# Patient Record
Sex: MALE | Race: WHITE | NOT HISPANIC OR LATINO | Employment: OTHER
[De-identification: names, ages, dates, MRNs, and addresses within clinical notes are randomized per-mention and may not be internally consistent; named-entity substitution may affect disease eponyms.]

---

## 2017-03-21 ENCOUNTER — RECORDS - HEALTHEAST (OUTPATIENT)
Dept: ADMINISTRATIVE | Facility: OTHER | Age: 62
End: 2017-03-21

## 2017-03-21 ENCOUNTER — OFFICE VISIT (OUTPATIENT)
Dept: NEUROLOGY | Facility: CLINIC | Age: 62
End: 2017-03-21

## 2017-03-21 VITALS — HEART RATE: 64 BPM | SYSTOLIC BLOOD PRESSURE: 148 MMHG | DIASTOLIC BLOOD PRESSURE: 82 MMHG

## 2017-03-21 DIAGNOSIS — F03.90 DEMENTIA WITHOUT BEHAVIORAL DISTURBANCE, UNSPECIFIED DEMENTIA TYPE: Primary | ICD-10-CM

## 2017-03-21 NOTE — PROGRESS NOTES
"Referral source: Established patient     Chief complaint: Dementia.      History of the Present Illness: Mr. Jose Loco is a 61-year-old man who returns to the neuroimmunology Clinic today for repeat evaluation of his encephalopathy.  This is a patient whom I saw initially for an opinion in October 2015.  At that time, he and his wife described a 4-year history of cognitive decline and behavioral change.  He had previously been seen by Dr. Brower of our department and prior to that saw a number of specialists at the [facility] in [place].  He had been found to have an abnormal paraneoplastic panel with positive acetylcholine receptor binding and modulating antibodies as well as GAD65 antibody.  There were several confounding factors including history of sleep apnea, history of alcohol abuse and possibly some lifelong learning disability.  Nevertheless, it was clear from the patient's wife that there had been a substantial change in his behavior in addition to progressive difficulties with memory loss over the past several years, and we were suspicious of a dementia.      We decided to embark on a course of immunotherapy (methylprednisolone 1 gram IV monthly) beginning in December 2015, essentially as a diagnostic and therapeutic trial.  A repeat neuropsychological evaluation in February 2016 showed some modest improvements in attention, learning and memory.  We have subsequently continued the steroids on a monthly basis for the next 12 months.      Today the patient reports that he is doing \"pretty good.\"  He last received steroids at the beginning of March.  He feels that he is, overall, more alert.  His wife notes that he is more conversant and outgoing than he was one year ago.  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  He has been reading the sports section " in the paper recently, which he had also stopped doing previously.  (He was able to answer some simple questions in that regard, such as being aware of the NCAA tournament is ongoing and that the local hockey club has lost several games in a row.)      His wife does not feel that there is anything from a cognitive standpoint that he has lost over the last year or so.        Review of Systems:  He does continue to have intermittent nausea and diarrhea after eating.  I did encourage them to discuss this persistent symptom with their primary care provider.      PHYSICAL EXAMINATION:   VITAL SIGNS:  Blood pressure 140/82; respiratory rate 12 breaths per minute; pulse 64.   GENERAL:  Somewhat disheveled-appearing man who presents to the examination accompanied by his wife, awake and alert and in no acute distress.   NEUROLOGIC:   MENTAL STATUS:  I administered the Kokmen Short Test of Mental Status, with scores on the subcomponents as follows:    Orientation:  7/8 (gives date as 03/22).     Attention:  5/7 (digit span is 5 forward).     Immediate recall:  4/4 (3 trials).     General Information:  2/4 (able to name first and current president, gives the number of weeks in a year as 360 and cannot provide a coherent definition of island).     Abstraction:  3/3.   Calculation:  1/4.   Construction:  2/4.     Delayed Recall 3/4.   Total score is 25/38, which is a decline of 1 point from July of last year.  Performance on delayed recall would be atypically preserved for dementia of the Alzheimer's type.      Assessment/plan:    1.  Dementia of undetermined etiology  Overall, the patient's mental status on an office screening test has been relatively stable dating back to October 2015.  He does not seem obviously worse over that interval, per the history presented by the patient and his wife, and possibly there may be some subtle improvements over that time course as well.      I told him that I would like him to undergo a  repeat neuropsychological evaluation.  They will be seeing Dr. Mcclendon at [place] tomorrow and will discuss obtaining repeat neurocognitive evaluation through Dr. Govea, who has performed his previous neurocognitive testing.  He seems to be tolerating steroids well and they are fairly convinced that this is helping, although I have not necessarily seen evidence of dramatic benefit.  For now, I am going to proceed with an additional couple of months of methylprednisolone treatments at 1 gram IV monthly.  In case there is any response, I do not want him to stop this medication before he has the neurocognitive testing done.  I am then going to have him return to clinic for discussion in 2 months.  If neurocognitive testing is suggestive of ongoing decline, I think we would need to re-evaluate the overall risks and benefits of this approach.  If he is at least stable, on the other hand, it may be reasonable to continue for now.      I spent 26 minutes with the patient and his wife in the office today, with greater than 50% of this time spent in counseling.         ELIZABETH LOGAN MD             D: 2017 15:11   T: 2017 16:26   MT: nh      Name:     LEYDI GRACE   MRN:      [id number]        Account:      [id number]   :      1955           Service Date: 2017      Document: [id number]

## 2017-03-21 NOTE — PATIENT INSTRUCTIONS
1. Advise repeat neuropsychological evaluation with Dr. Govea    2. Continue IV methylprednisolone monthly in April and May    3. Return to clinic in two months

## 2017-03-21 NOTE — MR AVS SNAPSHOT
After Visit Summary   3/21/2017    Jose Loco    MRN: [id number]           Patient Information     Date Of Birth          1955        Visit Information        Provider Department      3/21/2017 1:00 PM Marco Gipson MD [facility]        Today's Diagnoses     Dementia without behavioral disturbance, unspecified dementia type    -  1      Care Instructions    1. Advise repeat neuropsychological evaluation with Dr. Govea    2. Continue IV methylprednisolone monthly in April and May    3. Return to clinic in two months        Follow-ups after your visit        Your next 10 appointments already scheduled     May 16, 2017  2:30 PM CDT   Return Multiple Sclerosis with Marco Gipson MD   [facility] ([facility])    [address], [address]  [place] [place] [address] [phone]            Aug 30, 2017  1:00 PM CDT   Return Visit with Sandeep Owens MD   [facility] ([facility])    [address], [address]  [place] [place] [address] [phone]              Who to contact     Please call your clinic at [phone] to:    Ask questions about your health    Make or cancel appointments    Discuss your medicines    Learn about your test results    Speak to your doctor   If you have compliments or concerns about an experience at your clinic, or if you wish to file a complaint, please contact [facility] Patient Relations at [phone] or email us at [email or web address]         Additional Information About Your Visit        MyChart Information     [facility] is an electronic gateway that provides easy, online access to your medical records. With [facility], you can request a clinic appointment, read your test results, renew a prescription or communicate with  your care team.     To sign up for [employer] visit the website at [email or web address]   You will be asked to enter the access code listed below, as well as some personal information. Please follow the directions to create your username and password.     Your access code is: [email or web address]  Expires: 2017 12:39 AM     Your access code will  in 90 days. If you need help or a new code, please contact your [facility] or call [phone] for assistance.        Care EveryWhere ID     This is your Care EveryWhere ID. This could be used by other organizations to access your [place] medical records  [id number]        Your Vitals Were     Pulse                   64            Blood Pressure from Last 3 Encounters:   17 148/82   16 146/68   03/15/16 126/68    Weight from Last 3 Encounters:   12/21/15 104.3 kg (230 lb)   10/12/15 103.7 kg (228 lb 9.6 oz)              Today, you had the following     No orders found for display         Today's Medication Changes          These changes are accurate as of: 3/21/17 11:59 PM.  If you have any questions, ask your nurse or doctor.               Stop taking these medicines if you haven't already. Please contact your care team if you have questions.     fentaNYL 25 mcg/hr 72 hr patch   Commonly known as:  DURAGESIC   Stopped by:  Marco Gipson MD           HYDROcodone-acetaminophen  MG per tablet   Commonly known as:  NORCO   Stopped by:  Marco Gipson MD                    Primary Care Provider Office Phone # Fax #    Justo Briscoe [phone]       [facility] [address] [address]        Thank you!     Thank you for choosing [facility]  for your care. Our goal is always to provide you with excellent care. Hearing back from our patients is one way we can continue to improve our services. Please take a few  minutes to complete the written survey that you may receive in the mail after your visit with us. Thank you!             Your Updated Medication List - Protect others around you: Learn how to safely use, store and throw away your medicines at www.disposemymeds.org.          This list is accurate as of: 3/21/17 11:59 PM.  Always use your most recent med list.                   Brand Name Dispense Instructions for use    ALBUTEROL SULFATE HFA IN      Inhale 90 mcg into the lungs 1-2 puffs every 4 hours prn.       ALLOPURINOL PO      Take 100 mg by mouth 3 times daily       AMOXICILLIN PO      Take by mouth daily Reported on 3/21/2017       aspirin 81 MG tablet      Take by mouth daily       BACLOFEN PO      Take 10 mg by mouth 2 times daily       clotrimazole 1 % cream    LOTRIMIN     Apply topically 2 times daily Reported on 3/21/2017       COMPRESSION STOCKINGS      1 each       cyanocobalamin 1000 MCG/ML injection    VITAMIN B12     Inject 1 mL into the muscle every 30 days       DIPHENHYDRAMINE HCL EX      Externally apply 2 % topically as needed Reported on 3/21/2017       donepezil 10 MG tablet    ARICEPT    30 tablet    Take 1 tablet (10 mg) by mouth At Bedtime       FLONASE NA      Spray 50 mcg in nostril Reported on 3/21/2017       GABAPENTIN PO      Take 800 mg by mouth 3 times daily       HM VITAMIN D3 2000 UNITS Caps   Generic drug:  cholecalciferol     100 capsule    TAKE TWO TABLETS BY MOUTH ONCE EVERY DAY daily       IBUPROFEN PO      Take 800 mg by mouth as needed Reported on 3/21/2017       levETIRAcetam 500 MG tablet    KEPPRA    120 tablet    Take 1 tab (500mg) by mouth in AM, 1 tab (500mg) in PM, and 2 tabs (1000mg) at bedtime       lidocaine 5 % Patch    LIDODERM     Place 1 patch onto the skin every 24 hours Reported on 3/21/2017       MIRAPEX PO      Take 0.5 mg by mouth 3 times daily       NAFTIN 2 % Gel   Generic drug:  Naftifine      Apply topically daily Reported on 3/21/2017       other  "medical supplies      Cane (device) For home use. X 12 weeks.       PAXIL PO      Take 20 mg by mouth daily       SIMVASTATIN PO      Take 40 mg by mouth At Bedtime       TRAZODONE HCL PO      Take 1 tablet by mouth 3 times daily       triamcinolone 0.5 % cream    KENALOG     Apply topically 3 times daily Reported on 3/21/2017       * UNABLE TO FIND      Diabetic shoes DX: Diabetic Neuropathy       * UNABLE TO FIND      (order) P7 - CROW/CLON/GEORGETTE/IMIP/LIDO/PENT - 10/0.2/6/3/5/3% Apply 1-2 gms to affected area 3-4x per day. Rub in for 1-2 minutes.       * UNABLE TO FIND      Syringe w/ Needle, Disposable - 3 ML (syringe) 22 x 1\" As directed.       * UNABLE TO FIND      MEDICATION NAME: nupro       VITRON-C PO      Take 1 tablet by mouth 2 times daily 65 mg iron - 125 mg       ZOFRAN PO      Take by mouth as needed for nausea or vomiting Reported on 3/21/2017       * Notice:  This list has 4 medication(s) that are the same as other medications prescribed for you. Read the directions carefully, and ask your doctor or other care provider to review them with you.      "

## 2017-03-21 NOTE — LETTER
"3/21/2017      RE: Jose Loco  [address] [address]       Referral source: Established patient     Chief complaint: Dementia.      History of the Present Illness: Mr. Jose Loco is a 61-year-old man who returns to the neuroimmunology Clinic today for repeat evaluation of his encephalopathy.  This is a patient whom I saw initially for an opinion in October 2015.  At that time, he and his wife described a 4-year history of cognitive decline and behavioral change.  He had previously been seen by Dr. Brower of our department and prior to that saw a number of specialists at the [facility] in [place].  He had been found to have an abnormal paraneoplastic panel with positive acetylcholine receptor binding and modulating antibodies as well as GAD65 antibody.  There were several confounding factors including history of sleep apnea, history of alcohol abuse and possibly some lifelong learning disability.  Nevertheless, it was clear from the patient's wife that there had been a substantial change in his behavior in addition to progressive difficulties with memory loss over the past several years, and we were suspicious of a dementia.      We decided to embark on a course of immunotherapy (methylprednisolone 1 gram IV monthly) beginning in December 2015, essentially as a diagnostic and therapeutic trial.  A repeat neuropsychological evaluation in February 2016 showed some modest improvements in attention, learning and memory.  We have subsequently continued the steroids on a monthly basis for the next 12 months.      Today the patient reports that he is doing \"pretty good.\"  He last received steroids at the beginning of March.  He feels that he is, overall, more alert.  His wife notes that he is more conversant and outgoing than he was one year ago.  They relate that he is occasionally asking to help with tasks such as washing dishes at his brother-in-law's restaurant, which shows more " initiative then he seemed to have in the past.  He has been reading the sports section in the paper recently, which he had also stopped doing previously.  (He was able to answer some simple questions in that regard, such as being aware of the NCAA tournament is ongoing and that the local hockey club has lost several games in a row.)      His wife does not feel that there is anything from a cognitive standpoint that he has lost over the last year or so.        Review of Systems:  He does continue to have intermittent nausea and diarrhea after eating.  I did encourage them to discuss this persistent symptom with their primary care provider.      PHYSICAL EXAMINATION:   VITAL SIGNS:  Blood pressure 140/82; respiratory rate 12 breaths per minute; pulse 64.   GENERAL:  Somewhat disheveled-appearing man who presents to the examination accompanied by his wife, awake and alert and in no acute distress.   NEUROLOGIC:   MENTAL STATUS:  I administered the Kokmen Short Test of Mental Status, with scores on the subcomponents as follows:    Orientation:  7/8 (gives date as 03/22).     Attention:  5/7 (digit span is 5 forward).     Immediate recall:  4/4 (3 trials).     General Information:  2/4 (able to name first and current president, gives the number of weeks in a year as 360 and cannot provide a coherent definition of island).     Abstraction:  3/3.   Calculation:  1/4.   Construction:  2/4.     Delayed Recall 3/4.   Total score is 25/38, which is a decline of 1 point from July of last year.  Performance on delayed recall would be atypically preserved for dementia of the Alzheimer's type.      Assessment/plan:    1.  Dementia of undetermined etiology  Overall, the patient's mental status on an office screening test has been relatively stable dating back to October 2015.  He does not seem obviously worse over that interval, per the history presented by the patient and his wife, and possibly there may be some subtle  improvements over that time course as well.      I told him that I would like him to undergo a repeat neuropsychological evaluation.  They will be seeing Dr. Mcclendon at [place] tomorrow and will discuss obtaining repeat neurocognitive evaluation through Dr. Govea, who has performed his previous neurocognitive testing.  He seems to be tolerating steroids well and they are fairly convinced that this is helping, although I have not necessarily seen evidence of dramatic benefit.  For now, I am going to proceed with an additional couple of months of methylprednisolone treatments at 1 gram IV monthly.  In case there is any response, I do not want him to stop this medication before he has the neurocognitie testing done.  I am then going to have him return to clinic for discussion in 2 months.  If neurocognitive testing is suggestive of ongoing decline, I think we would need to re-evaluate the overall risks and benefits of this approach.  If he is at least stable, on the other hand, it may be reasonable to continue for now.      I spent 26 minutes with the patient and his wife in the office today, with greater than 50% of this time spent in counseling.      Marco Gipson MD   of Neurology  [facility]    Cc:  Justo Briscoe MD (PCP)  MD Kendra Prince, PhD  Patient

## 2017-03-22 ENCOUNTER — HOSPITAL ENCOUNTER (OUTPATIENT)
Dept: OCCUPATIONAL THERAPY | Age: 62
Setting detail: THERAPIES SERIES
Discharge: STILL ADMITTED OR ADMINISTRATIVE | End: 2017-03-22
Attending: PSYCHIATRY & NEUROLOGY

## 2017-03-22 ENCOUNTER — HOSPITAL ENCOUNTER (OUTPATIENT)
Dept: NEUROLOGY | Facility: CLINIC | Age: 62
Setting detail: THERAPIES SERIES
Discharge: STILL ADMITTED OR ADMINISTRATIVE | End: 2017-03-22
Attending: PSYCHIATRY & NEUROLOGY

## 2017-03-22 DIAGNOSIS — R41.89 COGNITIVE IMPAIRMENT: ICD-10-CM

## 2017-03-22 DIAGNOSIS — F03.90 PROGRESSIVE DEMENTIA WITH UNCERTAIN ETIOLOGY (H): ICD-10-CM

## 2017-03-31 ENCOUNTER — TELEPHONE (OUTPATIENT)
Dept: NEUROLOGY | Facility: CLINIC | Age: 62
End: 2017-03-31

## 2017-03-31 NOTE — TELEPHONE ENCOUNTER
I received the following message from Dr. Gipson:    This is a patient whom we have been treating for a question of autoimmune encephalopathy with monthly corticosteroid infusions. I would like him to receive 1 gram of methylprednisolone IV monthly x 2 dose in April and May (around the beginning of each month).    Therapy orders placed for methylprednisolone 1 gm IV monthly x 2 doses in April and May. Pended to Dr. Gipson for signature.

## 2017-04-03 NOTE — TELEPHONE ENCOUNTER
I called [facility] in [place] Briscoe's office, and spoke with Rhonda (phone [phone] fax [phone]), and she said that Dr Briscoe would have no problem cosigning these orders again; She will then forward them to [facility] in [place] (phone [phone] fax [phone]); I called [facility] and gave them the heads up as well; Infusion orders faxed to [facility] accordingly.    May Browne, MS RN Care Coordinator

## 2017-04-25 NOTE — TELEPHONE ENCOUNTER
Patients wife called and let us know that Dr. Briscoe's office has not received the orders to cosign. Forwarded a note to May Browne RN.     Bonifacio Kim LPN

## 2017-04-25 NOTE — TELEPHONE ENCOUNTER
That complicates matters somewhat. There are a couple of options--we could try to get him in with neuro-psychology at the [place], but I am doubtful that we could get him in before his appointment.     Alternatively, we could proceed with methylprednisolone infusions monthly through the summer and then reassess after his next neuropsychologic evaluation.     Let's touch base with the patient's wife to see what she wants to do--if they would like to expedite things, we can see when we can get him in here. Otherwise I would plan on seeing him back in September after his appointment with Dr. Govea.

## 2017-04-25 NOTE — TELEPHONE ENCOUNTER
[facility] never got the orders from Dr. Briscoe's office. I called Rhonda back and spoke to Cindi who will re-fax the orders to [facility]. I called Jose's wife to update her with this information. Regina also wanted us to know that she could not get Jose in with Neuro-psych until September as Dr. Hadley is out on maternity leave, they are on a waiting list.    Regina Murphy wanted to know if you still wanted to see Jose in May as he won't have had his neuro-psych testing. Thank you.    Gemini Macedo, MS RN Care Coordinator

## 2017-04-26 NOTE — TELEPHONE ENCOUNTER
I called Dr. Govea's office to get Jose's last neuro-psych eval. They will fax over most recent evaluation.    Gemini Macedo MS RN Care Coordinator

## 2017-04-26 NOTE — TELEPHONE ENCOUNTER
We need to get the records of his most recent neuropscyhological evaluation from Dr. Govea's office and make sure these are available to Dr. Pacheco at the time of his appointment.

## 2017-04-26 NOTE — TELEPHONE ENCOUNTER
I called Regina and Jose with Dr. Gipson's input. Regina is going to call [facility] this morning to get infusions set-up. I spoke with Vicky in Neuro-Psych and they are able to get him in on May 12th at 8:00 am, this was okay with the wife.    Gemini Macedo, MS RN Care Coordinator

## 2017-05-12 ENCOUNTER — OFFICE VISIT (OUTPATIENT)
Dept: NEUROPSYCHOLOGY | Facility: CLINIC | Age: 62
End: 2017-05-12

## 2017-05-12 DIAGNOSIS — F03.90 DEMENTIA WITHOUT BEHAVIORAL DISTURBANCE, UNSPECIFIED DEMENTIA TYPE: ICD-10-CM

## 2017-05-12 DIAGNOSIS — G40.309: Primary | ICD-10-CM

## 2017-05-12 NOTE — PROGRESS NOTES
The patient was seen for neuropsychological evaluation at the request of Marco Gipson for the purpose of diagnostic clarification and treatment planning.  Three hours of face to face testing were provided by this writer.  Please see Dr. Vicky Pacheco's report for a full interpretation of the findings.    Melina Manrique  Psychometrist

## 2017-05-12 NOTE — MR AVS SNAPSHOT
After Visit Summary   5/12/2017    Jose Loco    MRN: [id number]           Patient Information     Date Of Birth          1955        Visit Information        Provider Department      5/12/2017 8:00 AM Vicky Pacheco, PhD [facility]        Today's Diagnoses     Cryptogenic generalized epilepsy (H)    -  1    Dementia without behavioral disturbance, unspecified dementia type           Follow-ups after your visit        Your next 10 appointments already scheduled     May 16, 2017  2:30 PM CDT   Return Multiple Sclerosis with Marco Gipson MD   [facility] ([facility])    [address], [address]  [facility] [address] [phone]            Aug 30, 2017  1:00 PM CDT   Return Visit with Sandeep Owens MD   [facility] ([facility])    [address] [place] [address], [facility] [address]  [facility] [id number] [phone]              Who to contact     Please call your clinic at [phone] to:    Ask questions about your health    Make or cancel appointments    Discuss your medicines    Learn about your test results    Speak to your doctor   If you have compliments or concerns about an experience at your clinic, or if you wish to file a complaint, please contact [facility] Patient Relations at [phone] or email us at [email or web address]         Additional Information About Your Visit        MyChart Information     Boastifyt is an electronic gateway that provides easy, online access to your medical records. With [facility], you can request a clinic appointment, read your test results, renew a prescription or communicate with your care team.     To sign up for Boastifyt visit the website at [email or web address]   You will be asked to enter the access code listed below, as well as some personal  information. Please follow the directions to create your username and password.     Your access code is: [email or web address]  Expires: 2017 12:39 AM     Your access code will  in 90 days. If you need help or a new code, please contact your [facility] or call [phone] for assistance.        Care EveryWhere ID     This is your Care EveryWhere ID. This could be used by other organizations to access your [facility] medical records  [id number]         Blood Pressure from Last 3 Encounters:   17 148/82   16 146/68   03/15/16 126/68    Weight from Last 3 Encounters:   12/21/15 104.3 kg (230 lb)   10/12/15 103.7 kg (228 lb 9.6 oz)              We Performed the Following     [id number] TESTING, PER HR/PSYCHOLOGIST     NEUROPSYCH TESTING BY [facility]        Primary Care Provider Office Phone # Fax #    Justo Briscoe [phone]       [facility] [address] [facility] [address]        Thank you!     Thank you for choosing [facility]  for your care. Our goal is always to provide you with excellent care. Hearing back from our patients is one way we can continue to improve our services. Please take a few minutes to complete the written survey that you may receive in the mail after your visit with us. Thank you!             Your Updated Medication List - Protect others around you: Learn how to safely use, store and throw away your medicines at www.disposemymeds.org.          This list is accurate as of: 17 11:59 PM.  Always use your most recent med list.                   Brand Name Dispense Instructions for use    ALBUTEROL SULFATE HFA IN      Inhale 90 mcg into the lungs 1-2 puffs every 4 hours prn.       ALLOPURINOL PO      Take 100 mg by mouth 3 times daily       AMOXICILLIN PO      Take by mouth daily Reported on 3/21/2017       aspirin 81 MG tablet      Take by mouth daily       BACLOFEN PO      Take 10 mg by mouth 2  "times daily       clotrimazole 1 % cream    LOTRIMIN     Apply topically 2 times daily Reported on 3/21/2017       COMPRESSION STOCKINGS      1 each       cyanocobalamin 1000 MCG/ML injection    VITAMIN B12     Inject 1 mL into the muscle every 30 days       DIPHENHYDRAMINE HCL EX      Externally apply 2 % topically as needed Reported on 3/21/2017       donepezil 10 MG tablet    ARICEPT    30 tablet    Take 1 tablet (10 mg) by mouth At Bedtime       FLONASE NA      Spray 50 mcg in nostril Reported on 3/21/2017       GABAPENTIN PO      Take 800 mg by mouth 3 times daily       HM VITAMIN D3 2000 UNITS Caps   Generic drug:  cholecalciferol     100 capsule    TAKE TWO TABLETS BY MOUTH ONCE EVERY DAY daily       IBUPROFEN PO      Take 800 mg by mouth as needed Reported on 3/21/2017       levETIRAcetam 500 MG tablet    KEPPRA    120 tablet    Take 1 tab (500mg) by mouth in AM, 1 tab (500mg) in PM, and 2 tabs (1000mg) at bedtime       lidocaine 5 % Patch    LIDODERM     Place 1 patch onto the skin every 24 hours Reported on 3/21/2017       MIRAPEX PO      Take 0.5 mg by mouth 3 times daily       NAFTIN 2 % Gel   Generic drug:  Naftifine      Apply topically daily Reported on 3/21/2017       other medical supplies      Cane (device) For home use. X 12 weeks.       PAXIL PO      Take 20 mg by mouth daily       SIMVASTATIN PO      Take 40 mg by mouth At Bedtime       TRAZODONE HCL PO      Take 1 tablet by mouth 3 times daily       triamcinolone 0.5 % cream    KENALOG     Apply topically 3 times daily Reported on 3/21/2017       * UNABLE TO FIND      Diabetic shoes DX: Diabetic Neuropathy       * UNABLE TO FIND      (order) P7 - CROW/CLON/GEORGETTE/IMIP/LIDO/PENT - 10/0.2/6/3/5/3% Apply 1-2 gms to affected area 3-4x per day. Rub in for 1-2 minutes.       * UNABLE TO FIND      Syringe w/ Needle, Disposable - 3 ML (syringe) 22 x 1\" As directed.       * UNABLE TO FIND      MEDICATION NAME: nupro       VITRON-C PO      Take 1 tablet by " mouth 2 times daily 65 mg iron - 125 mg       ZOFRAN PO      Take by mouth as needed for nausea or vomiting Reported on 3/21/2017       * Notice:  This list has 4 medication(s) that are the same as other medications prescribed for you. Read the directions carefully, and ask your doctor or other care provider to review them with you.

## 2017-05-15 NOTE — PROGRESS NOTES
NAME: Jose Loco  MR#: [id number]  YOB: 1955  DATE OF EXAM: 5/12/2017    Neuropsychology Laboratory  [facility]  [address]  [place]  [address] [phone]    NEUROPSYCHOLOGICAL EVALUATION    RELEVANT HISTORY AND REASON FOR REFERRAL    Jose Loco is a 61 year old right handed former  with 12 years of formal education. Information was obtained via interview with the patient and review of his medical records, including prior neuropsychological evaluations. Mr. Loco has a complex medical history including chronic cervical myelopathy, progressive memory loss, gait disturbance, and sleep apnea. He has been evaluated extensively by neurologists at various institutions. He first met with Neurology at the [facility] on 8/18/2015, when he saw Bob Brower M.D. Records from that appointment describe a complicated history. In 2002 he started to lose balance while doing yard work, and was falling. He subsequently lost bowel and bladder control and had severe paralysis of his legs. He underwent surgery, apparently for a large herniated disc impinging the spinal cord, and had some return of function. He still had weakness of his legs but was able to ambulate. Around 2011 or 2013 he started to have problems with memory, which has been progressive. There was mention of history of difficulties at birth and doing poorly in school, and a history of chemical dependency including alcohol. Abnormal laboratory tests were suggestive of an autoimmune process. He had undergone a neuropsychological evaluation and [facility] under the direction of Anabelle Asif, PhD on 3/21/2013. The records from that evaluation are not available for review; however, Dr. Brower indicated that he had scored 124/144 on the DRS with difficulties in memory, initiation, and perseveration. He did poorly on the Wechsler Adult Intelligence Scale, the AVLT, Trail  Making Test, and Stroop. Fluency, naming, and executive functioning were also noted to be poor. He continues to be followed by Neurology, most recently by Marco Gipson MD. At an encounter on 3/21/2017, he was noted to be returning to the neuroimmunology clinic for repeat evaluation of his encephalopathy. Dr. Gipson indicated that he had initially seen Mr. Loco in October 2015, where he and his wife described a four year history of cognitive decline and behavior change. He had been found to have an abnormal paraneoplastic panel with positive acetylcholine receptor binding and modulating antibodies as well as GAD65 antibody. There were several confounding factors including history of sleep apnea, alcohol abuse, and possibly some lifelong learning disability. It was clear from his wife at the time that there had been a substantial change in his behavior in addition to progressive difficulties with memory loss over the past several years, so they were suspicious of a dementia. He had a course of immunotherapy beginning in December 2015 essentially as a diagnostic and therapeutic trial. A repeat neuropsychological evaluation in February 2016, under the direction of Kendra Govea, Ph.D., showed some modest improvements (see below), and the steroids were continued on a monthly basis for the next 12 months. When he returned to clinic in March 2017, he reported that he was doing  pretty good,  and that he had last received steroids at the beginning of March. He felt that he was overall more alert, and his wife noted that he was more conversant and outgoing that he was one year earlier. He was occasionally asking to help with tasks such as washing dishes at his brother-in-law s restaurant, and showed more initiative than he seems to have in the past. His wife did not feel there was anything from a cognitive standpoint that he has lost over the previous year. Dr. Gipson requested an updated  neuropsychological evaluation to assist in determining whether there has been benefit from the monthly methylprednisolone treatments. He noted that if the neurocognitive testing is suggestive of ongoing decline then it would be necessary to reevaluate the overall risks and benefits of the approach.     The report from a February 12, 2016 neuropsychological evaluation under the direction of Kendra Govea, Ph.D., was available for review. Dr. Govea indicated that she had previously evaluated Mr. Loco in 2015. At that time, he exhibited a pattern of cognitive slowing, executive dysfunction, and impaired learning and memory abilities that were felt to indicate the presence of both frontal and subcortical dysfunction. A multifactorial etiology was suspected (i.e., polypharmacy, developmental based learning difficulties, alcohol dependence). However, when the progressive course of his symptoms was considered in the context of his neurobehavioral symptoms and impaired activities of daily living, Dr. Govea felt that the findings might represent the emergence of behavioral variant frontotemporal dementia. Medial temporal lobe involvement was noted but was felt to be stable since his 2013 evaluation at [facility], possibly attributable to donepezil, which was initiated in 2014. She noted that in the interim between the 2015 and 2016 evaluations, Mr. 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 He was subsequently initiated on the therapeutic trial of methylprednisolone in December 2015 and his family reported an immediate improvement of his cognitive symptoms and behavior. Shortly thereafter, he reportedly allowed family members to take over the management of his medications, which he had previously refused, and he began weaning himself down from his narcotic pain medications. By  "February 2016, his family was reporting improvement in his cognition, emotional regulation, and social comportment since December 2015. Results of the February 2016 evaluations supported the presence of only subtle improvements in a select few areas of cognition. The majority of his performances remained in the mildly to moderately impaired range and were largely commensurate with the results of his 2015 evaluation. Subtle improvements were noted in attention, learning, and memory, when compared to his previous evaluation. His profile continued to support the presence of major neurocognitive disorder due to multiple etiologies with behavioral disturbance. A number of factors occurring over the course of the previous year were thought responsible for the noted improvements in his functioning including improved seizure control, medication compliance with decreasing reliance on narcotic pain medications, and his recently completed trial of immunotherapy.    CLINICAL INTERVIEW FINDINGS    Upon interview, Mr. Loco was not able to state clearly why he was being referred. His wife indicated that he has been diagnosed with frontotemporal dementia for the last five years or so. His first symptoms included a seizure. He was losing things and had behavioral changes. He became verbally aggressive, which was a major change for him as he had been mellow and kind before that. He became sarcastic and \"a little inappropriate.\" He began losing things. He had trouble recognizing people who he has known for a couple of years. His wife believes that his behavior is somewhat better than it used to be, depending on how tired he is. He is crabbier than he used to be, and she decided not to mention today s appointment until last night because he gets crabby. Nonetheless, he seems more likely to converse, and what he is saying is mostly appropriate. He has not noticed significant difficulty with cognition, believing that his thinking has " "improved. His wife noted, however, that memory has been a problem for him at times. He may forget to tell his wife something that people have said to him. He has been misplacing items. He occasionally forgets that he has said something. He has difficulty with word finding and occasionally with comprehension. He feels that his attention and concentration have improved. His wife believes that these may be somewhat better than a few years ago, but still not as good as they used to be. He has always had difficulty with reading. He has trouble with decision-making, usually stating \"I don't know,\" or \"I don't care.\" He is less organized with his tools and yard materials. On off days, he tends to swear more, which is a change for him. About two months ago he went to get a suit for wedding of a family member, and had inappropriate behaviors including being loud and also walking out of the changing room with his pants off. His wife noted that the steroid infusions seem to have helped him be able to function. He is getting up and going outside and carrying on a conversation more now. He agrees that the infusions have helped and is worried about what will happen if the infusions stop.    Mr. Loco lives with his wife. They used to share the management of their finances, although she has managed all the finances for the last four years. His wife organizes his medications, but he often forgets to take them on a daily basis. He stated that he drives without difficulty. His wife agreed, stating that he only drives in a small town, and does not drive if he is tired or has had a bad day. He used to do the dishes and housework, and now requires more prodding, even after his wife had three surgeries last year and had a lot of restrictions. His hygiene has declined. He requires reminders to change his clothes and does not seem to care about what he has on. He sometimes wears clothes that are inappropriate for the occasion, such as " "wearing shorts to a wedding or wearing a dirty shirt.    Mr. Loco has a longstanding history of depression, which was first treated after he had to switch jobs at [employer] to something brand-new, about 20 years ago. He is currently prescribed medications for depression. He has not worked with a counselor. He described his mood as \"up-and-down.\" When asked whether he was feeling depressed, he stated that he did not know how he would explain it. His wife stated that he seems more frustrated and cannot do the things that he used to do, and then gets mad. He had been crying, but since starting his course of methylprednisolone this does not seem to be happening as much. As noted, he has been more irritable and verbally aggressive. He has not been physically aggressive. His sleep has not been particularly good. He sleeps an average of five hours at night. He may take a nap for an hour during the day. His appetite has been good, although for the last couple of years he has gotten sick to his stomach frequently. He has gained 20 or 30 pounds. His energy level is low. His interest level is fair. He enjoys doing yard work. He denied suicidal ideation or any history of attempts to commit suicide. He denied visual or auditory hallucinations.    Mr. Loco reportedly has a history of excessive use of alcohol. His wife stated that he drank alcohol excessively many years ago, and decided to stop drinking entirely when his children were in middle school. He started drinking alcohol excessively for about a year, and then went to outpatient chemical dependency treatment six years ago, before his diagnosis of dementia. After his diagnosis, he said \"who cares,\" and started drinking again. Until three or four months ago he was ringing drinking or four alcoholic beverages daily. In the last three or four months he has been drinking less than that, about two alcoholic beverages two or three days a week. He denied illicit drug use, " "tobacco use, or gambling.    In school, Mr. Loco was in special education classes. He had difficulty with reading. Math was a favorite subject of his. He was held back in , but graduated from high school. He worked in [employer] at the [employer] for 29   years. He stopped working in May 2002 when he sustained a spinal cord injury. He is receiving disability. He has been  for 40 years. He has three adult sons.    According to his wife, Mr. Loco experienced a seizure around the time his dementia started, or perhaps before that. In retrospect, he may have had seizures earlier than that, as he would have episodes when he was staring off. He then had \"bigger ones,\" when he was making  funny noises,  was forgetful, and would fall. The last one of these occurred about two years ago. He is taking antiseizure medications. His wife wonders whether the small seizures may be starting again recently. He denied any history of stroke or head injury resulting loss of consciousness. His balance has generally been good. He has had some tremor in his hands and was frequently jerking while sleeping, although this has subsided recently. He had been getting headaches frequently for a while, but these have been better recently. He had emergency surgery on his spinal cord in 2002, and required a walker for a while. His physical symptoms improved for a while, but his legs have been bothering him more since he developed dementia. He has some pain in his knees as well.    PAST MEDICAL HISTORY: Records indicate a history of cryptogenic generalized epilepsy, bariatric surgery, positive glutamic acid decarboxylase antibody, and cervical spondylosis with myelopathy.    CURRENT MEDICATIONS: Include trazodone, levetiracetam, amoxicillin, ondansetron, donepezil, albuterol, allopurinol, aspirin 81 mg, baclofen, clotrimazole, cyanocobalamin, diphenhydramine, fluticasone propionate, gabapentin, ibuprofen, iron-vitamin C, " lidocaine, naftifine, paroxetine, pramipexole, simvastatin, and triamcinolone.    FAMILY MEDICAL HISTORY:  Significant for a mother with dementia with memory loss as the primary feature, beginning around the age of 77. She is currently about 83. He has cousins, aunts, and uncles with dementia on his mother's side, including a mother's cousin and another cousin with Alzheimer's disease which began in their 60s. He may have one family member with ALS on his mother's side of the family. He denied any family history of Parkinson's disease.    BEHAVIORAL OBSERVATIONS    During the evaluation, Mr. Loco was pleasant, cooperative, and seemed to understand the instructions. He was alert and oriented to person, place, and time. He wore knee braces, and it was difficult for him to stand up from his chair. He sat with his arms crossed during the interview and frequently yawned. Gait was slow, and he held onto the wall while walking. Mood was somewhat depressed. Speech was slow, with long latencies before responding, but normal articulation and volume. Spontaneous conversation was sparse but entirely appropriate. Insight and judgement appeared to be poor. Effort appeared to be adequate, and the results are believed to accurately reflect his current level of functioning.    MEASURES ADMINISTERED    The following measures were administered by a trained psychometrist, under the direct supervision of a licensed psychologist:    Subtests of the Wechsler Adult Intelligence Scale - 4; Reading subtest of the Wide Range Achievement Test - 4; subtests of the Wechsler Memory Scale - Revised; Mike Auditory Verbal Learning Test; Mike-Osterrieth Complex Figure; Darby Naming Test; Controlled Oral Word Association Test; Semantic Fluency; subtests of the Darby Diagnostic Aphasia Examination; Clock Drawing; Trail Making Test; Stroop; Wisconsin Card Sorting Test; Grooved Pegboard; Dementia Rating Scale - 2 (DRS-2); Geriatric Depression Scale  (GDS).    RESULTS AND INTERPRETATION    Overall intellectual functioning was estimated to fall in the mildly impaired range, consistent with premorbid estimates based on single word reading abilities, which fell at a 4th grade equivalent. Performance on a screening measure of dementia was moderately impaired (DRS-2 Total Score = 118/144). He had marked difficulty on tasks of initiation/perseveration and memory on this measure.    Confrontation naming was moderately impaired for his age, and did not improve with phonemic cues. Expressive vocabulary was moderately impaired. Letter fluency was moderately slowed, and generative naming to category was mildly slowed. Fluency tasks were notable for a paucity of responses rather than perseverative responses. Comprehension of complex ideational material was moderately impaired.    Attention span was moderately impaired for his age. Divided attention was moderately impaired, and was notable for difficulty shifting cognitive set. Performance on a measure of cognitive flexibility and speed was moderately slowed. Psychomotor processing speed was moderately slowed. Fine manual dexterity was mildly impaired bilaterally.    Construction of a clock to command was notable for difficulty with conceptualization. When asked to set the hands to 11:10, he instead set them to 10:10. Copy of a clock fell within normal limits. Construction of a complex design was moderately impaired, and was characterized by difficulty with planning and organization, and slightly incorrect placement of details. Visual problem-solving was mildly impaired. Assembly of visual material was low average.    Novel problem-solving, including the ability to generate strategies and solutions, was mildly impaired for his age and level of education.    Immediate recall of verbal narrative material was moderately impaired, with moderately impaired recall following a 30 minute delay. On a multiple trial list learning  task, learning was moderately inefficient, with mildly impaired retention (33%) following a 30 minute delay. Recognition memory on this task was notable for multiple false positive identifications of words. Immediate and 30 minute delayed recall of visual material was moderately impaired.    On the GDS, a self-report questionnaire, Mr. Loco endorsed a mild to moderate level of depressive symptomatology.    IMPRESSIONS AND RECOMMENDATIONS    Current results indicate mild to moderate dementia, with impairments across cognitive domains. He has moderate anomia as well as impaired comprehension. Impairments are noted in executive functioning including difficulty with problem solving, planning, and organization, as well as in complex attentional processing, learning and memory, and motor functioning bilaterally. Visual processing is impaired but reflects a relative strength. He acknowledged experiencing a mild to moderate level of depressive symptomatology.    Compared to his February 2016 evaluation under the direction of Kendra Govea, PhD, he has had apparent declines in verbal learning and memory, basic attention, and some aspects of visual processing. Performance otherwise remains largely stable across cognitive domains.    This pattern of performance is suggestive of global cerebral involvement. There is progression of cognitive difficulties over time, suggestive of a neurodegenerative etiology, and arguing against a static encephalopathy as the primary contributor. At previous evaluations, there was more suggestion of frontal and subcortical system involvement; now, the involvement seems to be global. There are certainly some features of his initial presentation which were suggestive of frontotemporal dementia, although other neurodegenerative etiologies such as Alzheimer s disease cannot be ruled out, particularly given the multiple likely contributors to his cognitive dysfunction. There are a number of  likely exacerbating factors, including a seizure disorder, possible autoimmune disorder, and ongoing alcohol use, although his wife indicated that he had been drinking 3 to 4 alcoholic beverages a day up until three or four months ago, and has declined to a couple of alcoholic beverages two or three days a week. Nonetheless, his ongoing alcohol use likely exacerbates his cognitive difficulties. Moreover, he is reporting significant depressive symptomatology, which also likely exacerbates his cognitive difficulties although it does not appear to be the sole contributor. Finally, there is a likely lifelong developmental disorder, which may exacerbate his cognitive difficulties but does not appear to be the sole contributor.    In terms of daily functioning, Mr. Loco is likely to require increasing assistance managing his instrumental activities of daily living. He continues to drive, limiting his driving to his hometown, and to days when he is not particularly tired. Given his cognitive difficulties, there are concerns about his safety while driving. It is noted that Dr. Govea also addressed concerns regarding safety while driving at his previous neuropsychological evaluations. One possibility would be to consider a formal  s assessment, such as that offered through [employer]. His wife has been organizing his medications for him, but they acknowledge that he often forgets to take them. He will likely require increased assistance in this regard. He will likely benefit from structure and routine. Given his poor insight and his cognitive difficulties, he may require minimal supervision for his safety. He may benefit from the use of simple written reminders and checklists.     Vicky Pacheco, Ph.D., ABPP  Licensed Psychologist, [employer] [id number]  Board Certified in Clinical Neuropsychology    Time spent:  Four hours professional time, including interview, record review, data integration, and report writing (CPT  35684); an additional three hours, including testing administered by a psychometrist and interpreted by a neuropsychologist (CPT 16930). ICD-10 diagnosis: G40.909; F03.9.

## 2017-05-17 NOTE — PROGRESS NOTES
WAIS-IV    Raw              Age Scaled   Vocabulary  12    4    Block Design       24     7   Coding   27     4   Digit Span   14     4   Visual Puzzles   6     5     WIDE RANGE ACHIEVEMENT TEST - 4    Standard  %tile               Grade      Score  Rank                Equiv  Reading    69      2            4.0     WECHSLER MEMORY SCALE - REVISED    Raw Score        MAS       %ile  Information & Orientation  13   Logical Memory  Immed.  6   2   <1   Logical Memory  30 min.  4   2   <1   Visual Reprod Imm.  19   3   1   Visual Reprod 30 min.  9   3   1   30 Minute Recognition  4     SHELLY AUDITORY VERBAL LEARNING TEST  (30 item recognition)    I   II III  IV  V VI  VII   2   3   5   5   6   3   2   30 Minute Recall  2  MAS  5   30 Minute Recognition  11  MAS  7           Intrusions  5       Learning Efficiency   <65  % Ret.  33                                                                     MAS  5     SHELLY-OSTERRIETH COMPLEX FIGURE   Raw Score   %tile  Copy  26.5     <1     BOSTON NAMING TEST  Score     28      MAS   2   <1 %ile  [  28 w/o cues  3 w/phonemic cues]    CONTROLLED ORAL WORD ASSOC TEST  Score     12              MAS 2   <1 %ile    SEMANTIC FLUENCY  Score     28              MAS 6   9 %ile    CLOCK DRAWING  Command  2 /3    Copy   3   /3       TRAIL MAKING TEST         Seconds         Errors           MAS                %ile  A    43   0   7  . 16   B    301   2   2  . <1     STROOP                    Raw   +  Alexis =     Total       MAS      %ile  Word  47  +  - =   47   2   <1   Color  38  +  - =   38   2   <1   C-W  13  +  - =   13   2   <1     WISCONSIN CARD SORTING TEST  # Categories  1              6-10    %ile  % persev err.       16           T       49         47  %ile    PSYCHOMOTOR TESTS                                        RH                  LH  Grooved Pegboard   133    T   30  156  T   30                                   Drops ( 1 )                  Drops (1  )    BOSTON DIAGNOSTIC  APHASIA EXAMINATION  Complex Ideational Material    5  T  13   Repetition:  High Probability: 5/8   Low Probability: 2/8    DEMENTIA RATING SCALE - 2    Raw MAS Raw     MAS   Attention  34   8   Concept  36   9    Init/Psv  25    3  Memory  17    3   Construct  6   10  Total     118/144   3     GERIATRIC DEPRESSION SCALE:  17    MAS = [place] Older Adult Normative Study Age Adj. Scaled Score

## 2017-05-29 ENCOUNTER — DOCUMENTATION ONLY (OUTPATIENT)
Dept: NEUROLOGY | Facility: CLINIC | Age: 62
End: 2017-05-29

## 2017-07-18 ENCOUNTER — OFFICE VISIT (OUTPATIENT)
Dept: NEUROLOGY | Facility: CLINIC | Age: 62
End: 2017-07-18

## 2017-07-18 VITALS — SYSTOLIC BLOOD PRESSURE: 162 MMHG | RESPIRATION RATE: 18 BRPM | HEART RATE: 52 BPM | DIASTOLIC BLOOD PRESSURE: 97 MMHG

## 2017-07-18 DIAGNOSIS — F03.90 DEMENTIA WITHOUT BEHAVIORAL DISTURBANCE, UNSPECIFIED DEMENTIA TYPE: Primary | ICD-10-CM

## 2017-07-18 ASSESSMENT — PAIN SCALES - GENERAL: PAINLEVEL: SEVERE PAIN (7)

## 2017-07-18 NOTE — PATIENT INSTRUCTIONS
1. We will continue to hold off on steroids for now    2. Return to the [facility] ([address] [address]) in 4 months: please call [phone] to arrange

## 2017-07-18 NOTE — MR AVS SNAPSHOT
After Visit Summary   7/18/2017    Jose Loco    MRN: [id number]           Patient Information     Date Of Birth          1955        Visit Information        Provider Department      7/18/2017 3:30 PM Marco Gipson MD [facility]        Care Instructions    1. We will continue to hold off on steroids for now    2. Return to the [facility] ([address] [place] [address]) in 4 months: please call [phone] to arrange          Follow-ups after your visit        Your next 10 appointments already scheduled     Aug 30, 2017  1:00 PM CDT   Return Visit with Sandeep Owens MD   [facility] ([facility])    [address], [address]  [facility] [address] [phone]              Who to contact     Please call your clinic at [phone] to:    Ask questions about your health    Make or cancel appointments    Discuss your medicines    Learn about your test results    Speak to your doctor   If you have compliments or concerns about an experience at your clinic, or if you wish to file a complaint, please contact [facility] Patient Relations at [phone] or email us at [email or web address]         Additional Information About Your Visit        MyChart Information     [employer]mn[employer] is an electronic gateway that provides easy, online access to your medical records. With [employer]mn[employer], you can request a clinic appointment, read your test results, renew a prescription or communicate with your care team.     To sign up for [employer] visit the website at [email or web address]   You will be asked to enter the access code listed below, as well as some personal information. Please follow the directions to create your username and password.     Your access code is: [email or web address]  Expires: 10/16/2017  4:34 PM      Your access code will  in 90 days. If you need help or a new code, please contact your [facility] or call [phone] for assistance.        Care EveryWhere ID     This is your Care EveryWhere ID. This could be used by other organizations to access your [place] medical records  [id number]        Your Vitals Were     Pulse Respirations                52 18           Blood Pressure from Last 3 Encounters:   17 (!) 162/97   17 148/82   16 146/68    Weight from Last 3 Encounters:   12/21/15 104.3 kg (230 lb)   10/12/15 103.7 kg (228 lb 9.6 oz)              Today, you had the following     No orders found for display       Primary Care Provider Office Phone # Fax #    Justo Briscoe [phone]       [facility] [address] [address]        Equal Access to Services     DENNIS GOLD : Hadii aad ku hadasho Soomaali, waaxda luqadaha, qaybta kaalmada adeegyada, waxay idiin hayaan adeeg kharabolivar rodn . So [facility] [phone].    ATENCIÓN: Si habla español, tiene a walsh disposición servicios gratuitos de asistencia lingüística. Malathi al [phone].    We comply with applicable federal civil rights laws and [place] laws. We do not discriminate on the basis of race, color, national origin, age, disability sex, sexual orientation or gender identity.            Thank you!     Thank you for choosing [facility]  for your care. Our goal is always to provide you with excellent care. Hearing back from our patients is one way we can continue to improve our services. Please take a few minutes to complete the written survey that you may receive in the mail after your visit with us. Thank you!             Your Updated Medication List - Protect others around you: Learn how to safely use, store and throw away your medicines at www.disposemymeds.org.          This list is accurate as  of: 7/18/17  4:35 PM.  Always use your most recent med list.                   Brand Name Dispense Instructions for use Diagnosis    ALBUTEROL SULFATE HFA IN      Inhale 90 mcg into the lungs 1-2 puffs every 4 hours prn.        ALLOPURINOL PO      Take 100 mg by mouth 3 times daily        AMOXICILLIN PO      Take by mouth daily Reported on 3/21/2017        aspirin 81 MG tablet      Take by mouth daily        BACLOFEN PO      Take 10 mg by mouth 2 times daily        clotrimazole 1 % cream    LOTRIMIN     Apply topically 2 times daily Reported on 3/21/2017        COMPRESSION STOCKINGS      1 each        cyanocobalamin 1000 MCG/ML injection    VITAMIN B12     Inject 1 mL into the muscle every 30 days        DIPHENHYDRAMINE HCL EX      Externally apply 2 % topically as needed Reported on 3/21/2017        donepezil 10 MG tablet    ARICEPT    30 tablet    Take 1 tablet (10 mg) by mouth At Bedtime    Idiopathic dementia, without behavioral disturbance       FLONASE NA      Spray 50 mcg in nostril Reported on 3/21/2017        GABAPENTIN PO      Take 800 mg by mouth 3 times daily        HM VITAMIN D3 2000 UNITS Caps   Generic drug:  cholecalciferol     100 capsule    TAKE TWO TABLETS BY MOUTH ONCE EVERY DAY daily    Vitamin D deficiency       IBUPROFEN PO      Take 800 mg by mouth as needed Reported on 3/21/2017        levETIRAcetam 500 MG tablet    KEPPRA    120 tablet    Take 1 tab (500mg) by mouth in AM, 1 tab (500mg) in PM, and 2 tabs (1000mg) at bedtime    Cryptogenic generalized epilepsy (H)       lidocaine 5 % Patch    LIDODERM     Place 1 patch onto the skin every 24 hours Reported on 3/21/2017        MIRAPEX PO      Take 0.5 mg by mouth 3 times daily        NAFTIN 2 % Gel   Generic drug:  Naftifine      Apply topically daily Reported on 3/21/2017        other medical supplies      Cane (device) For home use. X 12 weeks.        PAXIL PO      Take 20 mg by mouth daily        SIMVASTATIN PO      Take 40 mg by mouth At  "Bedtime        TRAZODONE HCL PO      Take 1 tablet by mouth 3 times daily        triamcinolone 0.5 % cream    KENALOG     Apply topically 3 times daily Reported on 3/21/2017        * UNABLE TO FIND      Diabetic shoes DX: Diabetic Neuropathy        * UNABLE TO FIND      (order) P7 - CROW/CLON/GEORGETTE/IMIP/LIDO/PENT - 10/0.2/6/3/5/3% Apply 1-2 gms to affected area 3-4x per day. Rub in for 1-2 minutes.        * UNABLE TO FIND      Syringe w/ Needle, Disposable - 3 ML (syringe) 22 x 1\" As directed.        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 Read the directions carefully, and ask your doctor or other care provider to review them with you.      "

## 2017-07-18 NOTE — LETTER
"7/18/2017      RE: Jose Loco  [address] [address]       Referral source: Established patient    Chief complaint: Dementia    History of the present illness: Mr. Jose Loco is a 61-year-old man who returns to the neuroimmunology clinic today for scheduled follow-up.    His history is as per my previous notes. I have been following him for approximately 2 years. He has seen multiple other neurologists for his 5 year history of cognitive decline and behavioral change. There was some suspicion of a possible autoimmune contributor after paraneoplastic panel demonstrated several abnormal antibodies, although none of the antibodies detected (GAD65, acetylcholine receptor) are strongly associated with autoimmune encephalopathy.    We started treating him with monthly pulses of IV methylprednisolone in December 2015 and it did seem that there had been some modest improvements on a repeat neuropsychological evaluation in February 2016. Accordingl,y we continued the treatment for another year. I had not perceived any dramatic improvement in the patient's status as regards previous evaluations in clinic, however.    He did have a repeat neuropsychological evaluation performed by Dr. Vicky Pacheco in May of this year. This was interpreted as demonstrating \"apparent declines in verbal learning and memory, basic attention, and some aspects of visual processing\" in comparison to previous evaluation in February 2016, although performance in other cognitive domains was relatively stable.    The patient's last IV steroid treatment was in May of this year. We were to see him back shortly after that, but he missed that appointment and is just now returning today. The patient and his wife do continue to have somewhat of a sense that he responds in some way to the steroid infusions. Since stopping infusions 2 months ago, there have been episodes of mild inappropriate behavior in public and he may possibly be " more irritable and less likely to engage in conversation. However, they do concur that there has not been any dramatic deterioration in his overall level of function since the steroid infusions were stopped.    The patient's son is getting  this Saturday and they are very much hoping that his condition will not cause any issues with that event, and that he will be able to get through the ceremony and reception.      Physical examination:  Vitals: Blood pressure 162/97; pulse 52.  General: Well nourished man who presents to the evaluation accompanied by his wife, awake and in no acute distress. Affect is flat. There are no socially inappropriate behaviors. He is able to engage on a superficial level as regards some recent events such as ongoing road construction projects.    I was not able to perform full examination today due to time constraints as the patient had arrived 30 minutes late for this appointment.    Assessment/plan:    1. Dementia  The patient's most recent neuropsychological evaluation was suggestive of some ongoing cognitive decline. There was no evidence of improvement in any domain after an additional 14 months of pulse IV steroid therapy. My overall sense is that there has not been any dramatic improvement with this therapy.    I discussed with the patient and his wife that what I would suggest is to continue to hold off on further steroid infusions at this time. I would like to re-evaluate him in approximately 4 months. At present, I am not convinced that there is a significant autoimmune contributor to his encephalopathy. We will continue to keep an open mind about this possibility, but at present I am not sure the keeping him on pulse steroids indefinitely is in his best interest as this is associated with some risks of immunosuppression, osteoporosis, etc.    The patient's wife inquired whether we should treat him with an additional dose of steroids this month in hopes of getting him  through the wedding on Saturday. I told him that I would rather not confound the assessment by giving him a single dose in this fashion, and although we have not seen such problems to date, there is some potential for pathologic elevated mood with steroid infusions and overall I would not be in favor of this. The patient's wife expresses understanding of this as well as agreement with the overall plan above.     I have asked them to let us know if there is any significant change in the patient's overall status before his next scheduled appointment.    I spent 19 minutes with the patient in the office today, with greater than 50% of this time spent in counseling.    Marco Gipson MD   of Neurology  [facility]    Cc:  Justo Briscoe MD (PCP)  Patient

## 2017-07-20 NOTE — PROGRESS NOTES
"Referral source: Established patient    Chief complaint: Dementia    History of the present illness: Mr. Jose Loco is a 61-year-old man who returns to the neuroimmunology clinic today for scheduled follow-up.    His history is as per my previous notes. I have been following him for approximately 2 years. He has seen multiple other neurologists for his 5 year history of cognitive decline and behavioral change. There was some suspicion of a possible autoimmune contributor after paraneoplastic panel demonstrated several abnormal antibodies, although none of the antibodies detected (GAD65, acetylcholine receptor) are strongly associated with autoimmune encephalopathy.    We started treating him with monthly pulses of IV methylprednisolone in December 2015 and it did seem that there had been some modest improvements on a repeat neuropsychological evaluation in February 2016. Accordingl,y we continued the treatment for another year. I had not perceived any dramatic improvement in the patient's status as regards previous evaluations in clinic, however.    He did have a repeat neuropsychological evaluation performed by Dr. Vicky Pacheco in May of this year. This was interpreted as demonstrating \"apparent declines in verbal learning and memory, basic attention, and some aspects of visual processing\" in comparison to previous evaluation in February 2016, although performance in other cognitive domains was relatively stable.    The patient's last IV steroid treatment was in May of this year. We were to see him back shortly after that, but he missed that appointment and is just now returning today. The patient and his wife do continue to have somewhat of a sense that he responds in some way to the steroid infusions. Since stopping infusions 2 months ago, there have been episodes of mild inappropriate behavior in public and he may possibly be more irritable and less likely to engage in conversation. However, they do concur " that there has not been any dramatic deterioration in his overall level of function since the steroid infusions were stopped.    The patient's son is getting  this Saturday and they are very much hoping that his condition will not cause any issues with that event, and that he will be able to get through the ceremony and reception.    Physical examination:  Vitals: Blood pressure 162/97; pulse 52.  General: Well nourished man who presents to the evaluation accompanied by his wife, awake and in no acute distress. Affect is flat. There are no socially inappropriate behaviors. He is able to engage on a superficial level as regards some recent events such as ongoing road construction projects.    I was not able to perform full examination today due to time constraints as the patient had arrived 30 minutes late for this appointment.    Assessment/plan:    1. Dementia  The patient's most recent neuropsychological evaluation was suggestive of some ongoing cognitive decline. There was no evidence of improvement in any domain after an additional 14 months of pulse IV steroid therapy. My overall sense is that there has not been any dramatic improvement with this therapy.    I discussed with the patient and his wife that what I would suggest is to continue to hold off on further steroid infusions at this time. I would like to re-evaluate him in approximately 4 months. At present, I am not convinced that there is a significant autoimmune contributor to his encephalopathy. We will continue to keep an open mind about this possibility, but at present I am not sure the keeping him on pulse steroids indefinitely is in his best interest as this is associated with some risks of immunosuppression, osteoporosis, etc.    The patient's wife inquired whether we should treat him with an additional dose of steroids this month in hopes of getting him through the wedding on Saturday. I told him that I would rather not confound the  assessment by giving him a single dose in this fashion, and although we have not seen such problems to date, there is some potential for pathologic elevated mood with steroid infusions and overall I would not be in favor of this. The patient's wife expresses understanding of this as well as agreement with the overall plan above.     I have asked them to let us know if there is any significant change in the patient's overall status before his next scheduled appointment.    I spent 19 minutes with the patient in the office today, with greater than 50% of this time spent in counseling.

## 2017-08-14 ENCOUNTER — TELEPHONE (OUTPATIENT)
Dept: NEUROLOGY | Facility: CLINIC | Age: 62
End: 2017-08-14

## 2017-08-14 DIAGNOSIS — G40.309: Primary | ICD-10-CM

## 2017-08-14 NOTE — TELEPHONE ENCOUNTER
"Caller: Regina   Relationship to Patient: wife   Call Back Number: [phone]   Reason for Call: pt recently had a grand mal sz. Pt has an appt on 9/8, would like to know if he should come in sooner    Jose had a GTC last Thursday, 8/10/17 at 11:45 AM. Seizure occurred during sleep that lasted 5 minutes. Spouse called paramedics. During the seizure he was foaming at the mouth, endorses tongue bite, remained unconscious for approximately 15 minutes.  Denies illness, denies n/v/d, afebrile, Jose drinks \"a couple of beers each day\". He did have a beer at 11:00 AM prior to taking a nap/seizure. He has been sleeping a lot during the day. Endorses \"some stress\" as son recently was .  Per spouse, history of FTD.    AED - ANTIEPILEPTIC DRUGS 5/24/2016   levETIRAcetam (Oral) Take 1 tab (500mg) by mouth in AM, 1 tab (500mg) in PM, and 2 tabs (1000mg) at bedtime (500 MG TABS)   GABAPENTIN  MG TID         "

## 2017-08-14 NOTE — TELEPHONE ENCOUNTER
PLAN: Discussed with Dr. Baez    1) Increase LEV by 500 mg. Daily dose will be given as 1000 mg AM, 500 mg PM, 1000 mg HS    2) Rx for LEV [phone] sent to pharmacy.    The POC was discussed with spouse Regina who verbalized agreement.

## 2017-08-15 RX ORDER — LEVETIRACETAM 500 MG/1
TABLET ORAL
Qty: 120 TABLET | Refills: 3 | Status: SHIPPED | OUTPATIENT
Start: 2017-08-15 | End: 2017-09-08

## 2017-09-13 ENCOUNTER — RECORDS - HEALTHEAST (OUTPATIENT)
Dept: ADMINISTRATIVE | Facility: OTHER | Age: 62
End: 2017-09-13

## 2018-03-12 ENCOUNTER — DOCUMENTATION ONLY (OUTPATIENT)
Dept: NEUROLOGY | Facility: CLINIC | Age: 63
End: 2018-03-12

## 2018-10-05 ENCOUNTER — OFFICE VISIT (OUTPATIENT)
Dept: NEUROLOGY | Facility: CLINIC | Age: 63
End: 2018-10-05
Payer: COMMERCIAL

## 2018-10-05 VITALS
TEMPERATURE: 97.6 F | HEIGHT: 67 IN | DIASTOLIC BLOOD PRESSURE: 76 MMHG | RESPIRATION RATE: 16 BRPM | OXYGEN SATURATION: 95 % | SYSTOLIC BLOOD PRESSURE: 133 MMHG | HEART RATE: 56 BPM | BODY MASS INDEX: 34.81 KG/M2 | WEIGHT: 221.8 LBS

## 2018-10-05 DIAGNOSIS — G40.309: ICD-10-CM

## 2018-10-05 PROBLEM — F32.A DEPRESSIVE DISORDER: Status: ACTIVE | Noted: 2018-10-05

## 2018-10-05 PROBLEM — K91.1 POSTGASTRIC SURGERY SYNDROMES: Status: ACTIVE | Noted: 2018-10-05

## 2018-10-05 PROBLEM — H52.4 PRESBYOPIA OF BOTH EYES: Status: ACTIVE | Noted: 2018-07-24

## 2018-10-05 PROBLEM — E78.5 HYPERLIPIDEMIA: Status: ACTIVE | Noted: 2018-01-09

## 2018-10-05 LAB
CREAT SERPL-MCNC: 1.29 MG/DL (ref 0.66–1.25)
GFR SERPL CREATININE-BSD FRML MDRD: 56 ML/MIN/1.7M2

## 2018-10-05 RX ORDER — LEVETIRACETAM 500 MG/1
TABLET ORAL
Qty: 186 TABLET | Refills: 11 | Status: SHIPPED | OUTPATIENT
Start: 2018-10-05 | End: 2019-01-15

## 2018-10-05 RX ORDER — HYDROCODONE BITARTRATE AND ACETAMINOPHEN 5; 325 MG/1; MG/1
TABLET ORAL PRN
COMMUNITY
Start: 2018-01-05

## 2018-10-05 RX ORDER — METHADONE HYDROCHLORIDE 5 MG/1
TABLET ORAL CONTINUOUS PRN
COMMUNITY
Start: 2018-09-09 | End: 2019-03-15

## 2018-10-05 ASSESSMENT — PAIN SCALES - GENERAL: PAINLEVEL: EXTREME PAIN (8)

## 2018-10-05 NOTE — PROGRESS NOTES
[facility]      Patient:  Jose Loco  :  1955   Age:  62 year old   Today's Office Visit:  10/5/2018    Epilepsy Data:    Patient History  Primary Epileptologist/Provider: Sandeep Owens M.D.  Patient Status: Not yet controlled  Epilepsy Syndrome: Generalized Epilepsy unspecified     Tests/Surgery History  Last EEG: oct 2015  Last MRI:     Seizure Record  Current Visit Date: 10/05/18  Previous Visit Date: 17  Months since last visit: 12.88  Seizure Type 1: Unspecified Staring Spell  Description of Sz Type 1: stares, still, unresponsive  # of Type 1 Seizure since last visit: 30  Freq. Type 1 / Month: 2.33  Seizure Type 2: Unspecified Convulsion  Description of Sz Type 2: collapse with stiffening and amnesia  # of Type 2 Seizure since last visit: 0  Freq. Type 2 / Month: 0    History of Present Illness:     No further GTCs.    Wife notes staring spells returned about two months ago and continued for about six weeks.  Daily spells. One minute or more. Sometimes responded when she called and sometimes not.    Ran out of levetiracetam for one week. Did not experience either staring spell or GTC.  Medications restarted yesterday. Somewhat more sedated than usual today.    Wife feels gait and memory are worse. Patient disagrees. Denies new weakness, numbness, ataxia.    Current Outpatient Prescriptions   Medication Sig Dispense Refill     ALBUTEROL SULFATE HFA IN Inhale 90 mcg into the lungs 1-2 puffs every 4 hours prn.       ALLOPURINOL PO Take 100 mg by mouth 3 times daily       aspirin 81 MG tablet Take by mouth daily       BACLOFEN PO Take 10 mg by mouth 2 times daily       clotrimazole (LOTRIMIN) 1 % cream Apply topically 2 times daily Reported on 3/21/2017       COMPRESSION STOCKINGS 1 each       cyanocobalamin (VITAMIN B12) 1000 MCG/ML injection Inject 1 mL into the muscle every 30 days       DIPHENHYDRAMINE HCL EX Externally apply 2 % topically as needed  "Reported on 3/21/2017       donepezil (ARICEPT) 10 MG tablet Take 1 tablet (10 mg) by mouth At Bedtime 30 tablet 11     Fluticasone Propionate (FLONASE NA) Spray 50 mcg in nostril Reported on 3/21/2017       GABAPENTIN PO Take 800 mg by mouth 3 times daily       HM VITAMIN D3 2000 UNITS CAPS TAKE TWO TABLETS BY MOUTH ONCE EVERY DAY daily 100 capsule 2     HYDROcodone-acetaminophen (NORCO) 5-325 MG per tablet as needed       Iron-Vitamin C (VITRON-C PO) Take 1 tablet by mouth 2 times daily 65 mg iron - 125 mg       levETIRAcetam (KEPPRA) 500 MG tablet Take three po  tablet 3     methadone (DOLOPHINE) 5 MG tablet continuous prn       Naftifine (NAFTIN) 2 % GEL Apply topically daily Reported on 3/21/2017       Ondansetron HCl (ZOFRAN PO) Take by mouth as needed for nausea or vomiting Reported on 3/21/2017       other medical supplies Cane (device) For home use. X 12 weeks.       PARoxetine HCl (PAXIL PO) Take 20 mg by mouth daily       Pramipexole Dihydrochloride (MIRAPEX PO) Take 0.5 mg by mouth 3 times daily       SIMVASTATIN PO Take 40 mg by mouth At Bedtime       TRAZODONE HCL PO Take 1 tablet by mouth 3 times daily       triamcinolone (KENALOG) 0.5 % cream Apply topically 3 times daily Reported on 3/21/2017       UNABLE TO FIND MEDICATION NAME: nupro       UNABLE TO FIND Diabetic shoes DX: Diabetic Neuropathy       UNABLE TO FIND (order) P7 - CROW/CLON/GEORGETTE/IMIP/LIDO/PENT - 10/0.2/6/3/5/3% Apply 1-2 gms to affected area 3-4x per day. Rub in for 1-2 minutes.       UNABLE TO FIND Syringe w/ Needle, Disposable - 3 ML (syringe) 22 x 1\" As directed.       AMOXICILLIN PO Take by mouth daily Reported on 3/21/2017       IBUPROFEN PO Take 800 mg by mouth as needed Reported on 3/21/2017       lidocaine (LIDODERM) 5 % patch Place 1 patch onto the skin every 24 hours Reported on 3/21/2017          Medication Notes:    Taking levetiracetam 1500 mg twice a day.    AED Medication Compliance:  compliant all of the " "time  Using a pill box:  Yes    Review of Systems:  Denies headaches. Denies loss of vision. Denies double vision. Denies dysphagia. Denies cough, wheezing, hemoptysis. Denies chest pain, leg swelling. Denies significant weight change, abdominal pain, nausea, vomiting, change in bowel habits, blood per rectum. Denies dysuria, hematuria, flank pain, or kidney stones.  Have you experienced a traumatic fall since your last visit: NO  Are these falls related to your seizures: Not Applicable    Other Issues:    Ambulation poor. Wife thinks worse than before. Wife thinks memory worse, patient not.  [facility]  Is patient safe to drive:  No    Exam:    /76  Pulse 56  Temp 97.6  F (36.4  C) (Oral)  Resp 16  Ht 1.702 m (5' 7\")  Wt 100.6 kg (221 lb 12.8 oz)  SpO2 95%  BMI 34.74 kg/m2     Wt Readings from Last 5 Encounters:   10/05/18 100.6 kg (221 lb 12.8 oz)   09/08/17 110.2 kg (243 lb)   12/21/15 104.3 kg (230 lb)   10/12/15 103.7 kg (228 lb 9.6 oz)     MMSE today total = 27. Gets 2 of 3 memory items. Gets date wrong. Overall within range of my last exam August 2016. Pentagons, clock drawing done well. Antalgic gait with wide base. Cannot stand on each leg alone. Romberg negative. VFF. EOMI with good smooth pursuit and no nystagmus. Smile symmetrical. No drift, pronation or tremor. Proximal and distal strength full in arms and legs. DTR symmetrical in arms. Knee jerks symmetrical. Cannot elicit ankle jerks. For me tone is normal in arms and legs. FFN is done well. HKS is done well. Toe tap is clumsy bilaterally.     HR 50 to 60. Soft systolic murmur right superior sternum, does not radiate. No carotid bruits audible.     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 This is based on interictal EEG and on apparent response to levetiracetam. Suspect he has had long term increased seizure tendency. I do not think seizures are related " to cerebral degenerative syndrome or potential cerebral auto-immune disorder. Convulsive seizures are controlled. Not clear whether staring spells are absence seizures or daydreaming attacks.   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 Appears stable cognitively and per neuro exam. Additional mild depression.  3) Hypovitaminosis D; mild, repleted.    DISCUSSION  Discussed means of distinguishing absence seizures from daydreaming attacks. Asked to keep seizure diary. Discussed benefits of repeat EEG. Depending on results can consider adding ethosuximide or divalproex.     PLAN:  1) Continue current levetiracetam and vitamin D. Wrote Rx for levetiracetam for one year.  2) 3 hour video-EEG. Seizure diary. Family examination as discussed. EEG day of visit with Dr Salamanca.  3) Followup with Dr Salamanca as planned.  4) RTC 4 to 6 months. Depending on results can either add new medications or consider VEEG.     Sandeep Owens MD

## 2018-10-05 NOTE — NURSING NOTE
Chief Complaint   Patient presents with     Seizures     [facility] RETURN SEIZURE 1 YEAR F/U     Adilson Arauz, EMT

## 2018-10-05 NOTE — PATIENT INSTRUCTIONS
"Keep a seizure diary.    Examine during staring spells. Ask him to remember something such as \"remember two bananas. Ask him to follow simple command. Note results in seizure diary. If he can respond and answer during staring spells they are not seizures.    We will try to repeat EEG during visit with Dr Salamanca.  "

## 2018-10-05 NOTE — MR AVS SNAPSHOT
"              After Visit Summary   10/5/2018    Jose Loco    MRN: [id number]           Patient Information     Date Of Birth          1955        Visit Information        Provider Department      10/5/2018 4:45 PM Sandeep Owens MD [facility] Neu[facility]        Today's Diagnoses     Cryptogenic generalized epilepsy (H)          Care Instructions    Keep a seizure diary.    Examine during staring spells. Ask him to remember something such as \"remember two bananas. Ask him to follow simple command. Note results in seizure diary. If he can respond and answer during staring spells they are not seizures.    We will try to repeat EEG during visit with Dr Salamanca.          Follow-ups after your visit        Follow-up notes from your care team     Return in about 6 months (around 4/5/2019).      Your next 10 appointments already scheduled     Oct 05, 2018  5:45 PM CDT   LAB with  LAB   [facility] ([facility])    [address] [place] [id number] [phone]           Please do not eat 10-12 hours before your appointment if you are coming in fasting for labs on lipids, cholesterol, or glucose (sugar). This does not apply to pregnant women. Water, hot tea and black coffee (with nothing added) are okay. Do not drink other fluids, diet soda or chew gum.            Oct 25, 2018  1:00 PM CDT   (Arrive by 12:45 PM)   Return Multiple Sclerosis with Marco Gipson MD   [facility] Multiple Sclerosis ([facility])    [address] [id number] [phone]            Apr 05, 2019  4:15 PM CDT   (Arrive by 4:00 PM)   Return Seizure with Sandeep Owens MD   [facility] ([facility])    [address] [id number] [phone]              Future tests that were ordered for you today     Open Future Orders        " "Priority Expected Expires Ordered    Creatinine Routine  10/5/2019 10/5/2018            Who to contact     Please call your clinic at [phone] to:    Ask questions about your health    Make or cancel appointments    Discuss your medicines    Learn about your test results    Speak to your doctor            Additional Information About Your Visit        MyChart Information     [employer] is an electronic gateway that provides easy, online access to your medical records. With [employer], you can request a clinic appointment, read your test results, renew a prescription or communicate with your care team.     To sign up for [employer] visit the website at [email or web address]   You will be asked to enter the access code listed below, as well as some personal information. Please follow the directions to create your username and password.     Your access code is: [email or web address]  Expires: 1/3/2019  6:31 AM     Your access code will  in 90 days. If you need help or a new code, please contact your [facility] or call [phone] for assistance.        Care EveryWhere ID     This is your Care EveryWhere ID. This could be used by other organizations to access your [place] medical records  [id number]        Your Vitals Were     Pulse Temperature Respirations Height Pulse Oximetry BMI (Body Mass Index)    56 97.6  F (36.4  C) (Oral) 16 1.702 m (5' 7\") 95% 34.74 kg/m2       Blood Pressure from Last 3 Encounters:   10/05/18 133/76   17 151/70   17 (!) 162/97    Weight from Last 3 Encounters:   10/05/18 100.6 kg (221 lb 12.8 oz)   17 110.2 kg (243 lb)   12/21/15 104.3 kg (230 lb)              We Performed the Following     [facility] EEG VIDEO < 12 HR     Levetiracetam (Keppra)  S ([facility])          Where to get your medicines      These medications were sent to [facility], [place], [place] - [place], [place] - [address]  [address], " [place] [address]     Phone:  [phone]     levETIRAcetam 500 MG tablet         Information about OPIOIDS     PRESCRIPTION OPIOIDS: WHAT YOU NEED TO KNOW   We gave you an opioid (narcotic) pain medicine. It is important to manage your pain, but opioids are not always the best choice. You should first try all the other options your care team gave you. Take this medicine for as short a time (and as few doses) as possible.    Some activities can increase your pain, such as bandage changes or therapy sessions. It may help to take your pain medicine 30 to 60 minutes before these activities. Reduce your stress by getting enough sleep, working on hobbies you enjoy and practicing relaxation or meditation. Talk to your care team about ways to manage your pain beyond prescription opioids.    These medicines have risks:    DO NOT drive when on new or higher doses of pain medicine. These medicines can affect your alertness and reaction times, and you could be arrested for driving under the influence (DUI). If you need to use opioids long-term, talk to your care team about driving.    DO NOT operate heavy machinery    DO NOT do any other dangerous activities while taking these medicines.    DO NOT drink any alcohol while taking these medicines.     If the opioid prescribed includes acetaminophen, DO NOT take with any other medicines that contain acetaminophen. Read all labels carefully. Look for the word  acetaminophen  or  Tylenol.  Ask your pharmacist if you have questions or are unsure.    You can get addicted to pain medicines, especially if you have a history of addiction (chemical, alcohol or substance dependence). Talk to your care team about ways to reduce this risk.    All opioids tend to cause constipation. Drink plenty of water and eat foods that have a lot of fiber, such as fruits, vegetables, prune juice, apple juice and high-fiber cereal. Take a laxative (Miralax, milk of magnesia, Colace, Senna) if you  don t move your bowels at least every other day. Other side effects include upset stomach, sleepiness, dizziness, throwing up, tolerance (needing more of the medicine to have the same effect), physical dependence and slowed breathing.    Store your pills in a secure place, locked if possible. We will not replace any lost or stolen medicine. If you don t finish your medicine, please throw away (dispose) as directed by your pharmacist. The [place] Pollution Control Agency has more information about safe disposal: [email or web address]-medications         Primary Care Provider Office Phone # Fax #    Justo Briscoe [phone]       [facility] [address] [address]        Equal Access to Services     DENNIS GOLD : Yael birdo Sobob, waaxda luqadaha, qaybta kaalmada aderafayaphillip, kiera mcmillan . So [facility] [phone].    ATENCIÓN: Si habla español, tiene a walsh disposición servicios gratuitos de asistencia lingüística. Llame al [phone].    We comply with applicable federal civil rights laws and [place] laws. We do not discriminate on the basis of race, color, national origin, age, disability, sex, sexual orientation, or gender identity.            Thank you!     Thank you for choosing [facility]UR[facility]  for your care. Our goal is always to provide you with excellent care. Hearing back from our patients is one way we can continue to improve our services. Please take a few minutes to complete the written survey that you may receive in the mail after your visit with us. Thank you!             Your Updated Medication List - Protect others around you: Learn how to safely use, store and throw away your medicines at www.disposemymeds.org.          This list is accurate as of 10/5/18  5:42 PM.  Always use your most recent med list.                   Brand Name Dispense Instructions for use Diagnosis     ALBUTEROL SULFATE HFA IN      Inhale 90 mcg into the lungs 1-2 puffs every 4 hours prn.        ALLOPURINOL PO      Take 100 mg by mouth 3 times daily        AMOXICILLIN PO      Take by mouth daily Reported on 3/21/2017        aspirin 81 MG tablet      Take by mouth daily        BACLOFEN PO      Take 10 mg by mouth 2 times daily        clotrimazole 1 % cream    LOTRIMIN     Apply topically 2 times daily Reported on 3/21/2017        COMPRESSION STOCKINGS      1 each        cyanocobalamin 1000 MCG/ML injection    VITAMIN B12     Inject 1 mL into the muscle every 30 days        DIPHENHYDRAMINE HCL EX      Externally apply 2 % topically as needed Reported on 3/21/2017        donepezil 10 MG tablet    ARICEPT    30 tablet    Take 1 tablet (10 mg) by mouth At Bedtime    Idiopathic dementia, without behavioral disturbance       FLONASE NA      Spray 50 mcg in nostril Reported on 3/21/2017        GABAPENTIN PO      Take 800 mg by mouth 3 times daily        HM VITAMIN D3 2000 units Caps   Generic drug:  cholecalciferol     100 capsule    TAKE TWO TABLETS BY MOUTH ONCE EVERY DAY daily    Vitamin D deficiency       HYDROcodone-acetaminophen 5-325 MG per tablet    NORCO     as needed        IBUPROFEN PO      Take 800 mg by mouth as needed Reported on 3/21/2017        levETIRAcetam 500 MG tablet    KEPPRA    186 tablet    Take three po BID    Cryptogenic generalized epilepsy (H)       lidocaine 5 % Patch    LIDODERM     Place 1 patch onto the skin every 24 hours Reported on 3/21/2017        methadone 5 MG tablet    DOLOPHINE     continuous prn        MIRAPEX PO      Take 0.5 mg by mouth 3 times daily        NAFTIN 2 % Gel   Generic drug:  Naftifine      Apply topically daily Reported on 3/21/2017        other medical supplies      Cane (device) For home use. X 12 weeks.        PAXIL PO      Take 20 mg by mouth daily        SIMVASTATIN PO      Take 40 mg by mouth At Bedtime        TRAZODONE HCL PO      Take 1 tablet by mouth 3  "times daily        triamcinolone 0.5 % cream    KENALOG     Apply topically 3 times daily Reported on 3/21/2017        UNABLE TO FIND      Diabetic shoes DX: Diabetic Neuropathy        UNABLE TO FIND      (order) P7 - CROW/CLON/GEORGETTE/IMIP/LIDO/PENT - 10/0.2/6/3/5/3% Apply 1-2 gms to affected area 3-4x per day. Rub in for 1-2 minutes.        UNABLE TO FIND      Syringe w/ Needle, Disposable - 3 ML (syringe) 22 x 1\" As directed.        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

## 2018-10-05 NOTE — LETTER
10/5/2018       RE: Jose Loco  [address] [facility] [address]     Dear Colleague,    Thank you for referring your patient, Jose Loco, to the [facility] NEUROLOGY at [facility]. Please see a copy of my visit note below.    [facility]psy [facility] Progress Note      Patient:  Jose Loco  :  1955   Age:  62 year old   Today's Office Visit:  10/5/2018    Epilepsy Data:    Patient History  Primary Epileptologist/Provider: Sandeep Owens M.D.  Patient Status: Not yet controlled  Epilepsy Syndrome: Generalized Epilepsy unspecified     Tests/Surgery History  Last EEG: oct 2015  Last MRI:     Seizure Record  Current Visit Date: 10/05/18  Previous Visit Date: 17  Months since last visit: 12.88  Seizure Type 1: Unspecified Staring Spell  Description of Sz Type 1: stares, still, unresponsive  # of Type 1 Seizure since last visit: 30  Freq. Type 1 / Month: 2.33  Seizure Type 2: Unspecified Convulsion  Description of Sz Type 2: collapse with stiffening and amnesia  # of Type 2 Seizure since last visit: 0  Freq. Type 2 / Month: 0    History of Present Illness:     No further GTCs.    Wife notes staring spells returned about two months ago and continued for about six weeks.  Daily spells. One minute or more. Sometimes responded when she called and sometimes not.    Ran out of levetiracetam for one week. Did not experience either staring spell or GTC.  Medications restarted yesterday. Somewhat more sedated than usual today.    Wife feels gait and memory are worse. Patient disagrees. Denies new weakness, numbness, ataxia.    Current Outpatient Prescriptions   Medication Sig Dispense Refill     ALBUTEROL SULFATE HFA IN Inhale 90 mcg into the lungs 1-2 puffs every 4 hours prn.       ALLOPURINOL PO Take 100 mg by mouth 3 times daily       aspirin 81 MG tablet Take by mouth daily       BACLOFEN PO Take 10 mg by mouth 2 times daily        "clotrimazole (LOTRIMIN) 1 % cream Apply topically 2 times daily Reported on 3/21/2017       COMPRESSION STOCKINGS 1 each       cyanocobalamin (VITAMIN B12) 1000 MCG/ML injection Inject 1 mL into the muscle every 30 days       DIPHENHYDRAMINE HCL EX Externally apply 2 % topically as needed Reported on 3/21/2017       donepezil (ARICEPT) 10 MG tablet Take 1 tablet (10 mg) by mouth At Bedtime 30 tablet 11     Fluticasone Propionate (FLONASE NA) Spray 50 mcg in nostril Reported on 3/21/2017       GABAPENTIN PO Take 800 mg by mouth 3 times daily       HM VITAMIN D3 2000 UNITS CAPS TAKE TWO TABLETS BY MOUTH ONCE EVERY DAY daily 100 capsule 2     HYDROcodone-acetaminophen (NORCO) 5-325 MG per tablet as needed       Iron-Vitamin C (VITRON-C PO) Take 1 tablet by mouth 2 times daily 65 mg iron - 125 mg       levETIRAcetam (KEPPRA) 500 MG tablet Take three po  tablet 3     methadone (DOLOPHINE) 5 MG tablet continuous prn       Naftifine (NAFTIN) 2 % GEL Apply topically daily Reported on 3/21/2017       Ondansetron HCl (ZOFRAN PO) Take by mouth as needed for nausea or vomiting Reported on 3/21/2017       other medical supplies Cane (device) For home use. X 12 weeks.       PARoxetine HCl (PAXIL PO) Take 20 mg by mouth daily       Pramipexole Dihydrochloride (MIRAPEX PO) Take 0.5 mg by mouth 3 times daily       SIMVASTATIN PO Take 40 mg by mouth At Bedtime       TRAZODONE HCL PO Take 1 tablet by mouth 3 times daily       triamcinolone (KENALOG) 0.5 % cream Apply topically 3 times daily Reported on 3/21/2017       UNABLE TO FIND MEDICATION NAME: nupro       UNABLE TO FIND Diabetic shoes DX: Diabetic Neuropathy       UNABLE TO FIND (order) P7 - CROW/CLON/GEORGETTE/IMIP/LIDO/PENT - 10/0.2/6/3/5/3% Apply 1-2 gms to affected area 3-4x per day. Rub in for 1-2 minutes.       UNABLE TO FIND Syringe w/ Needle, Disposable - 3 ML (syringe) 22 x 1\" As directed.       AMOXICILLIN PO Take by mouth daily Reported on 3/21/2017       IBUPROFEN " "PO Take 800 mg by mouth as needed Reported on 3/21/2017       lidocaine (LIDODERM) 5 % patch Place 1 patch onto the skin every 24 hours Reported on 3/21/2017          Medication Notes:    Taking levetiracetam 1500 mg twice a day.    AED Medication Compliance:  compliant all of the time  Using a pill box:  Yes    Review of Systems:  Denies headaches. Denies loss of vision. Denies double vision. Denies dysphagia. Denies cough, wheezing, hemoptysis. Denies chest pain, leg swelling. Denies significant weight change, abdominal pain, nausea, vomiting, change in bowel habits, blood per rectum. Denies dysuria, hematuria, flank pain, or kidney stones.  Have you experienced a traumatic fall since your last visit: NO  Are these falls related to your seizures: Not Applicable    Other Issues:    Ambulation poor. Wife thinks worse than before. Wife thinks memory worse, patient not.  They will be seeing [facility] in two weeks.  Is patient safe to drive:  No    Exam:    /76  Pulse 56  Temp 97.6  F (36.4  C) (Oral)  Resp 16  Ht 1.702 m (5' 7\")  Wt 100.6 kg (221 lb 12.8 oz)  SpO2 95%  BMI 34.74 kg/m2     Wt Readings from Last 5 Encounters:   10/05/18 100.6 kg (221 lb 12.8 oz)   09/08/17 110.2 kg (243 lb)   12/21/15 104.3 kg (230 lb)   10/12/15 103.7 kg (228 lb 9.6 oz)     MMSE today total = 27. Gets 2 of 3 memory items. Gets date wrong. Overall within range of my last exam August 2016. Pentagons, clock drawing done well. Antalgic gait with wide base. Cannot stand on each leg alone. Romberg negative. VFF. EOMI with good smooth pursuit and no nystagmus. Smile symmetrical. No drift, pronation or tremor. Proximal and distal strength full in arms and legs. DTR symmetrical in arms. Knee jerks symmetrical. Cannot elicit ankle jerks. For me tone is normal in arms and legs. FFN is done well. HKS is done well. Toe tap is clumsy bilaterally.     HR 50 to 60. Soft systolic murmur right superior sternum, does not radiate. No " carotid bruits audible.     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 This is based on interictal EEG and on apparent response to levetiracetam. Suspect he has had long term increased seizure tendency. I do not think seizures are related to cerebral degenerative syndrome or potential cerebral auto-immune disorder. Convulsive seizures are controlled. Not clear whether staring spells are absence seizures or daydreaming attacks.   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 Appears stable cognitively and per neuro exam. Additional mild depression.  3) Hypovitaminosis D; mild, repleted.    DISCUSSION  Discussed means of distinguishing absence seizures from daydreaming attacks. Asked to keep seizure diary. Discussed benefits of repeat EEG. Depending on results can consider adding ethosuximide or divalproex.     PLAN:  1) Continue current levetiracetam and vitamin D. Wrote Rx for levetiracetam for one year.  2) 3 hour video-EEG. Seizure diary. Family examination as discussed. EEG day of visit with Dr Salamanca.  3) Followup with Dr Salamanca as planned.  4) RTC 4 to 6 months. Depending on results can either add new medications or consider VEEG.     Sandeep Owens MD

## 2018-10-06 ASSESSMENT — PATIENT HEALTH QUESTIONNAIRE - PHQ9: SUM OF ALL RESPONSES TO PHQ QUESTIONS 1-9: 8

## 2018-10-09 LAB — LEVETIRACETAM SERPL-MCNC: 30 UG/ML (ref 12–46)

## 2018-10-25 ENCOUNTER — OFFICE VISIT (OUTPATIENT)
Dept: NEUROLOGY | Facility: CLINIC | Age: 63
End: 2018-10-25
Attending: PSYCHIATRY & NEUROLOGY
Payer: MEDICARE

## 2018-10-25 VITALS — BODY MASS INDEX: 34.11 KG/M2 | HEIGHT: 67 IN | WEIGHT: 217.3 LBS

## 2018-10-25 DIAGNOSIS — F03.91 DEMENTIA WITH BEHAVIORAL DISTURBANCE, UNSPECIFIED DEMENTIA TYPE: Primary | ICD-10-CM

## 2018-10-25 DIAGNOSIS — G40.309: ICD-10-CM

## 2018-10-25 DIAGNOSIS — G20.C PARKINSONISM, UNSPECIFIED PARKINSONISM TYPE (H): ICD-10-CM

## 2018-10-25 PROCEDURE — G0463 HOSPITAL OUTPT CLINIC VISIT: HCPCS | Mod: ZF

## 2018-10-25 RX ORDER — ROTIGOTINE 3 MG/24H
PATCH, EXTENDED RELEASE TRANSDERMAL DAILY
Refills: 3 | COMMUNITY
Start: 2018-10-10 | End: 2019-01-15

## 2018-10-25 RX ORDER — CARBIDOPA AND LEVODOPA 25; 100 MG/1; MG/1
TABLET ORAL
Qty: 180 TABLET | Refills: 1 | Status: SHIPPED | OUTPATIENT
Start: 2018-10-25 | End: 2018-11-29

## 2018-10-25 RX ORDER — POTASSIUM CHLORIDE 1500 MG/1
TABLET, EXTENDED RELEASE ORAL DAILY
COMMUNITY
Start: 2018-10-20 | End: 2019-03-15

## 2018-10-25 RX ORDER — ROPINIROLE 1 MG/1
TABLET, FILM COATED ORAL 4 TIMES DAILY
COMMUNITY
Start: 2018-10-18 | End: 2019-01-15

## 2018-10-25 NOTE — LETTER
"10/25/2018      RE: Jose Loco  [address] [facility] [address]     Date of service: 10/25/2018      Referral source: Established patient.      Chief complaint:  Dementia.      History of the Present Illness: Mr. Jose Loco is a 62-year-old right-handed man who returns to the [facility] today for scheduled follow-up.      The patient's history is as per my previous notes.  I initially began seeing this patient in 2015 for complaint of a 5-year history of cognitive decline and behavioral change.  There was some concern about the possibility of an autoimmune encephalopathy after a paraneoplastic panel demonstrated presence of GAD65 and acetylcholine receptor antibodies.  He was treated with pulses of IV methylprednisolone for about 14 months from the end of 2015 through early 2017.  Office testing of mental status did not demonstrate any clear improvement in his cognition in response to this medication and we stopped the steroids in May 2017 after his most recent neuropsychological examination was suggestive of some ongoing cognitive decline.      This is actually my first time seeing the patient in about 15 months.  His wife indicates that they missed his last follow-up appointment due to her health concerns.      He was recently seen back by Dr. Brian Owens of our department for cryptogenic generalized epilepsy.  At that appointment, the patient's wife reported increase in staring spells and a 3-hour video EEG  was recommended, but this has not been completed as of yet.  Currently, he is on 1500 mg of levetiracetam twice daily.      As regards his cognitive status, the patient's wife reports that overall the patient seems to be \"slowing down\" in general.  She relates that their son recently came up for a visit and noted that the patient looked substantially worse than he did at the time of the Christmas holiday last year.  He has had some behavioral issues such as increased " "irritability.  His wife indicates that the patient will frequently get up and leave the room if she comes in. He has told her to \"shut up\", that he is tired of hearing her voice and is generally less conversant.      Additionally, she feels that there has been some motor decline as well.  She notes a \"shuffling\" quality to the patient's gait.  He has not fallen recently.  She occasionally will see his hand shaking if he is holding an object, but has not noted any tremor at rest.  They deny any dream enactment behavior, micrographia, difficulty with rapid alternating movements or visual hallucinations.      Past Medical History:  1.  Alcohol use disorder.   2.  Iron deficiency anemia.   3.  Obstructive sleep apnea.   4.  Peripheral neuropathy.   5.  Gout.   6.  Status post gastric bypass surgery.   7.  Diabetes mellitus.   8.  Status post left total knee arthroplasty in 2008.   9.  Status post left total hip arthroplasty.      PHYSICAL EXAMINATION:   VITAL SIGNS:  Weight 98.6 kg; height 1.7 meters.   GENERAL:   Well-nourished man who appears his stated age of 62 years, presents to the examination accompanied by his wife, awake and alert and in no acute distress.   NEUROLOGIC:   MENTAL STATUS:   I administered the [place] Short Test of Mental Status, with scores on the subcomponents as follows:   ORIENTATION:  6/8 (gives the year as 1980 and the city as [place]).   ATTENTION:  5/7 (Digit span is 5 forward).   IMMEDIATE RECALL:  4/4 (2 trials).   GENERAL INFORMATION:  2/4 (able to name the first and current president, unable to tell me the number of weeks in a year or provide a coherent definition of the word \"island\").     ABSTRACTION:  3/3.   CALCULATION:  1/4 (unable to multiply or divide); states \"41\" to the sum of 29 and 11.   CONSTRUCTION:  2/4 (unable to copy a cube); error with the placement of the hands on a clock drawing task with placing the hour hand on 10 when instructed to draw 10 minutes after 11, " although the minute hand is in the right location).   DELAYED RECALL:  2/4.     Total score on the [place]men Short Test of Mental Status is 24/38.  Scores in this range are most commonly associated with established dementia.     CRANIAL NERVES:  Visual fields are full to confrontation.  Extraocular movements are intact with no internuclear ophthalmoplegia.  He does have hypomimia and substantially reduced blink rate.  Facial strength is normal.  Hearing is adequate for conversational purposes.  Palate elevation and tongue protrusion are normal.   POWER:  Strength is normal (5/5) in the following muscles/groups bilaterally:  Deltoids, biceps, triceps, wrist extensors, finger extensors, first dorsal interosseous, hip flexors, hamstrings and anterior tibialis.   REFLEXES:  Reflexes are roughly symmetric and within normal limits at biceps, triceps, brachioradialis and ankles.  The right knee reflex is brisker than the left, although this may be secondary to previous left knee surgery.   MOTOR/CEREBELLAR:  I do not see any tremor or other abnormal spontaneous movements today.  Tone does appear somewhat increased in the left arm as compared to the right.  He does not have any marked asymmetry or dampening of rapid alternating movements.  There is no appendicular ataxia on finger-to-nose testing.  There is no pronator drift in the arms.   GAIT:  The patient is able to ambulate unassisted on a flat, level surface without loss of postural stability.  He does not have any prominent retropulsive tendency.  Arm swing on the left does appear mildly reduced.      Assessment/plan:    1.  Dementia  Subjectively, the patient's wife feels that his mental status is declining over time.  His score on office-based testing of mental status is quite remarkably similar to performance over the past 3 years, with scores ranging from 23-26 on the Kokmen instrument throughout this time with little variation in areas of relative strength or  weakness identified.  I do suspect that his cognitive impairment is multifactorial.  There may be some degree of an underlying static encephalopathy based on history of some struggles in school.  There has been a degree of excessive alcohol use in the past that may be contributing as well.      At present, I do not see any strong indication of any autoimmune contributor to his encephalopathy.  There was no dramatic worsening after we discontinued pulse steroids nor any clear evidence of response to such treatment, and I would not favor reinitiating this at the present time.  I do think that the possibility of a neurodegenerative component is there based on the history provided by the patient's wife, although at present, this seems to have been relatively slowly progressive.  I did suggest that he be evaluated by a behavioral neurology specialist at this time and they are open to this.  I am going to ask for a review by Dr. Jae Barragan of our department in this regard.  In the interim, we will address his other symptomatic issues as outlined further below.      2. Cryptogenic generalized epilepsy  This is felt most likely to be a longstanding pattern and likely unrelated to any neurodegenerative process or question of autoimmunity.  A 3-hour video EEG was previously requested by Dr. Owens and we are going to go ahead and arrange that study.  Clearly, if the patient were having frequent subclinical seizure activity, that could affect his cognition.  I suspect that this is relatively unlikely, but we should start with a short-term outpatient EEG to evaluate this possibility.      3.  Parkinsonism  The patient's appearance to gross inspection today is suspicious for some type of parkinsonian phenotype.  He has substantially reduced blink rate and reduced facial expression.  Motor examination in the limbs is more subtle, although he may have some increase in tone and reduced arm swing on the left.  He is on a moderate  dose of ropinirole for restless leg syndrome and this conceivably could be masking some degree of Parkinsonian symptoms.  I do not know how likely a diagnosis of idiopathic Parkinson disease is here, but he has never had a trial of carbidopa/levodopa and I do think it would be reasonable to proceed with that.  I am going to have him start Sinemet at one half tablet by mouth 3 times daily for 1 week and then increase the dose by 1/2 tablet 3 times daily every week until he is up to a dose of 2 tablets 3 times daily.  I will then see him back for a review in 1 month to see if there has been any evidence of a motor response to this medication.  I did explain to the patient and his wife that it is unlikely that either his cognition or any degree of postural instability are going to respond significantly to dopamine replacement therapy even if there was any motor response, but it is possible that treatment this could be of benefit for his bradyphrenia.      This was a prolonged discussion with the patient and his wife today. I spent 48 minutes with them in the office today, with greater than 50% of this time spent in counseling.      ADDENDUM (11-): Video EEG study demonstrated findings consistent with generalized epilepsy, but no electrographic seizures were seen during three hour recording.    Marco Gipson MD   of Neurology  [facility]    Cc:  Justo Briscoe MD (PCP)  Brian Owens MD (Neurology-Epilepsy)  Patient

## 2018-10-25 NOTE — MR AVS SNAPSHOT
After Visit Summary   10/25/2018    Jose Loco    MRN: [id number]           Patient Information     Date Of Birth          1955        Visit Information        Provider Department      10/25/2018 1:00 PM Marco Gipson MD [facility]        Today's Diagnoses     Cryptogenic generalized epilepsy (H)    -  1    Dementia with behavioral disturbance, unspecified dementia type        Parkinsonism, unspecified Parkinsonism type (H)          Care Instructions    1. We will start Sinemet (carbidopa/levodopa) 25/100 per the following schedule:    Week 1-1/2 tab three times daily    Week 2-1 tab three times daily    Week 3-1 and a half tablets three times daily    Week 4-2 tabs three times daily    2. We will schedule a video EEG (3 hour) at your convenience    3. We will refer you to Dr. Jae Barragan in [facility] for evaluation of possible neurodegenerative process ([phone])    4. Return to clinic in one month on Sinemet trial          Follow-ups after your visit        Additional Services     NEUROLOGY ADULT REFERRAL       Your provider has referred you for the following:   Consult at [facility]: [facility] [phone]   [email or web address]    Dr. Jae Barragan     Please be aware that coverage of these services is subject to the terms and limitations of your health insurance plan.  Call member services at your health plan with any benefit or coverage questions.      Please bring the following with you to your appointment:    (1) Any X-Rays, CTs or MRIs which have been performed.  Contact the facility where they were done to arrange for  prior to your scheduled appointment.    (2) List of current medications  (3) This referral request   (4) Any documents/labs given to you for this referral                  Follow-up notes from your care team     Return in about 1 month (around 11/25/2018).      Your  next 10 appointments already scheduled     Nov 05, 2018 11:30 AM CST   EEG with [facility] EEG 1   [facility] ([facility])    [address], [address]  [facility] [id number] [phone]            Nov 29, 2018  9:30 AM CST   (Arrive by 9:15 AM)   Return Multiple Sclerosis with Marco Gipson MD   [facility] Sclerosis ([facility])    [address]  [address]  [place] [facility] [address] [phone]            Apr 05, 2019  4:15 PM CDT   (Arrive by 4:00 PM)   Return Seizure with Sandeep Owens MD   [facility] Neurology ([facility])    [address]  [address] Floor  [facility] [address] [phone]              Future tests that were ordered for you today     Open Future Orders        Priority Expected Expires Ordered    <12 hour EEG Monitoring/Video (49839 mod 52) Routine 10/25/2018 10/25/2019 10/25/2018    NEUROLOGY ADULT REFERRAL Routine 10/25/2018 10/25/2019 10/25/2018            Who to contact     If you have questions or need follow up information about today's clinic visit or your schedule please contact [facility] directly at [phone].  Normal or non-critical lab and imaging results will be communicated to you by [employer], letter or phone within 4 business days after the clinic has received the results. If you do not hear from us within 7 days, please contact the clinic through [employer] or phone. If you have a critical or abnormal lab result, we will notify you by phone as soon as possible.  Submit refill requests through [employer] or call your pharmacy and they will forward the refill request to us. Please allow 3 business days for your refill to be completed.          Additional Information About Your Visit        [employer] Information     [employer] lets you send messages to your doctor, view your test results, renew your prescriptions, schedule appointments and more. To sign  "up, go to [email or web address] . Click on \"Log in\" on the left side of the screen, which will take you to the Welcome page. Then click on \"Sign up Now\" on the right side of the page.     You will be asked to enter the access code listed below, as well as some personal information. Please follow the directions to create your username and password.     Your access code is: [email or web address]  Expires: 1/3/2019  6:31 AM     Your access code will  in 90 days. If you need help or a new code, please call your [facility] clinic or [phone].        Care EveryWhere ID     This is your Care EveryWhere ID. This could be used by other organizations to access your [facility] medical records  [id number]        Your Vitals Were     Height BMI (Body Mass Index)                1.702 m (5' 7\") 34.03 kg/m2           Blood Pressure from Last 3 Encounters:   10/05/18 133/76   17 151/70   17 (!) 162/97    Weight from Last 3 Encounters:   10/25/18 98.6 kg (217 lb 4.8 oz)   10/05/18 100.6 kg (221 lb 12.8 oz)   17 110.2 kg (243 lb)                 Today's Medication Changes          These changes are accurate as of 10/25/18  2:27 PM.  If you have any questions, ask your nurse or doctor.               Start taking these medicines.        Dose/Directions    carbidopa-levodopa  MG per tablet   Commonly known as:  SINEMET   Used for:  Parkinsonism, unspecified Parkinsonism type (H)   Started by:  Marco Gipson MD        Week 1: 1/2 tablet three times daily Week 2: 1 tablet 3 times daily Week 3: 1.5 tabs 3 times daily Week 4: 2 tabs three times daily   Quantity:  180 tablet   Refills:  1         These medicines have changed or have updated prescriptions.        Dose/Directions    levETIRAcetam 500 MG tablet   Commonly known as:  KEPPRA   This may have changed:  additional instructions   Used for:  Cryptogenic generalized epilepsy (H)        Take three po BID   Quantity:  186 tablet   Refills:  11          "   Where to get your medicines      These medications were sent to [facility], [place], [place] - [place], [place] - [address]  [address], [facility] [address]     Phone:  [phone]     carbidopa-levodopa  MG per tablet                Primary Care Provider Office Phone # Fax #    Justo Briscoe [phone]       [place] [address] [facility] [address]        Equal Access to Services     SKYLER GOLD : Hadii aad ku hadasho Soomaali, waaxda luqadaha, qaybta kaalmada adeegyada, waxay idiin hayaan adeeg kharabolivar mcmillan . So [facility] [phone].    ATENCIÓN: Si habla español, tiene a walsh disposición servicios gratuitos de asistencia lingüística. [facility] [phone].    We comply with applicable federal civil rights laws and [place] laws. We do not discriminate on the basis of race, color, national origin, age, disability, sex, sexual orientation, or gender identity.            Thank you!     Thank you for choosing [facility]  for your care. Our goal is always to provide you with excellent care. Hearing back from our patients is one way we can continue to improve our services. Please take a few minutes to complete the written survey that you may receive in the mail after your visit with us. Thank you!             Your Updated Medication List - Protect others around you: Learn how to safely use, store and throw away your medicines at www.disposemymeds.org.          This list is accurate as of 10/25/18  2:27 PM.  Always use your most recent med list.                   Brand Name Dispense Instructions for use Diagnosis    ALBUTEROL SULFATE HFA IN      Inhale 90 mcg into the lungs 1-2 puffs every 4 hours prn.        ALLOPURINOL PO      Take 100 mg by mouth 3 times daily        AMOXICILLIN PO      Take by mouth daily ONLY FOR DENTAL APPOINTMENT: Reported on 3/21/2017        aspirin 81 MG tablet      Take by mouth  daily        BACLOFEN PO      Take 10 mg by mouth 2 times daily        carbidopa-levodopa  MG per tablet    SINEMET    180 tablet    Week 1: 1/2 tablet three times daily Week 2: 1 tablet 3 times daily Week 3: 1.5 tabs 3 times daily Week 4: 2 tabs three times daily    Parkinsonism, unspecified Parkinsonism type (H)       clotrimazole 1 % cream    LOTRIMIN     Apply topically 2 times daily Reported on 3/21/2017        COMPRESSION STOCKINGS      1 each        cyanocobalamin 1000 MCG/ML injection    VITAMIN B12     Inject 1 mL into the muscle every 30 days        DIPHENHYDRAMINE HCL EX      Externally apply 2 % topically as needed Reported on 3/21/2017        donepezil 10 MG tablet    ARICEPT    30 tablet    Take 1 tablet (10 mg) by mouth At Bedtime    Idiopathic dementia, without behavioral disturbance       FLONASE NA      Spray 50 mcg in nostril Reported on 3/21/2017        GABAPENTIN PO      Take 800 mg by mouth 3 times daily        HM VITAMIN D3 2000 units Caps   Generic drug:  cholecalciferol     100 capsule    TAKE TWO TABLETS BY MOUTH ONCE EVERY DAY daily    Vitamin D deficiency       HYDROcodone-acetaminophen 5-325 MG per tablet    NORCO     as needed        IBUPROFEN PO      Take 800 mg by mouth as needed Reported on 3/21/2017        levETIRAcetam 500 MG tablet    KEPPRA    186 tablet    Take three po BID    Cryptogenic generalized epilepsy (H)       lidocaine 5 % Patch    LIDODERM     Place 1 patch onto the skin every 24 hours Reported on 3/21/2017        methadone 5 MG tablet    DOLOPHINE     continuous prn        NAFTIN 2 % Gel   Generic drug:  Naftifine      Apply topically daily Reported on 3/21/2017        NEUPRO 3 MG/24HR Pt24   Generic drug:  Rotigotine      daily        other medical supplies      Cane (device) For home use. X 12 weeks.        PAXIL PO      Take 20 mg by mouth daily        potassium chloride SA 20 MEQ CR tablet    K-DUR/KLOR-CON M     daily        rOPINIRole 1 MG tablet     "REQUIP     4 times daily        SIMVASTATIN PO      Take 40 mg by mouth At Bedtime        TRAZODONE HCL PO      Take 1 tablet by mouth 3 times daily        triamcinolone 0.5 % cream    KENALOG     Apply topically 3 times daily Reported on 3/21/2017        UNABLE TO FIND      Diabetic shoes DX: Diabetic Neuropathy        UNABLE TO FIND      (order) P7 - CROW/CLON/GEORGETTE/IMIP/LIDO/PENT - 10/0.2/6/3/5/3% Apply 1-2 gms to affected area 3-4x per day. Rub in for 1-2 minutes.        UNABLE TO FIND      Syringe w/ Needle, Disposable - 3 ML (syringe) 22 x 1\" As directed.        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

## 2018-10-26 NOTE — PROGRESS NOTES
"Date of service: 10/25/2018      Referral source: Established patient.      Chief complaint:  Dementia.      History of the Present Illness: Mr. Jose Loco is a 62-year-old right-handed man who returns to the [facility] today for scheduled follow-up.      The patient's history is as per my previous notes.  I initially began seeing this patient in 2015 for complaint of a 5-year history of cognitive decline and behavioral change.  There was some concern about the possibility of an autoimmune encephalopathy after a paraneoplastic panel demonstrated presence of GAD65 and acetylcholine receptor antibodies.  He was treated with pulses of IV methylprednisolone for about 14 months from the end of 2015 through early 2017.  Office testing of mental status did not demonstrate any clear improvement in his cognition in response to this medication and we stopped the steroids in May 2017 after his most recent neuropsychological examination was suggestive of some ongoing cognitive decline.      This is actually my first time seeing the patient in about 15 months.  His wife indicates that they missed his last follow-up appointment due to her health concerns.      He was recently seen back by Dr. Brian Owens of our department for cryptogenic generalized epilepsy.  At that appointment, the patient's wife reported increase in staring spells and a 3-hour video EEG  was recommended, but this has not been completed as of yet.  Currently, he is on 1500 mg of levetiracetam twice daily.      As regards his cognitive status, the patient's wife reports that overall the patient seems to be \"slowing down\" in general.  She relates that their son recently came up for a visit and noted that the patient looked substantially worse than he did at the time of the Christmas holiday last year.  He has had some behavioral issues such as increased irritability.  His wife indicates that the patient will frequently get up and leave the room " "if she comes in. He has told her to \"shut up\", that he is tired of hearing her voice and is generally less conversant.      Additionally, she feels that there has been some motor decline as well.  She notes a \"shuffling\" quality to the patient's gait.  He has not fallen recently.  She occasionally will see his hand shaking if he is holding an object, but has not noted any tremor at rest.  They deny any dream enactment behavior, micrographia, difficulty with rapid alternating movements or visual hallucinations.      Past Medical History:  1.  Alcohol use disorder.   2.  Iron deficiency anemia.   3.  Obstructive sleep apnea.   4.  Peripheral neuropathy.   5.  Gout.   6.  Status post gastric bypass surgery.   7.  Diabetes mellitus.   8.  Status post left total knee arthroplasty in 2008.   9.  Status post left total hip arthroplasty.      PHYSICAL EXAMINATION:   VITAL SIGNS:  Weight 98.6 kg; height 1.7 meters.   GENERAL:   Well-nourished man who appears his stated age of 62 years, presents to the examination accompanied by his wife, awake and alert and in no acute distress.   NEUROLOGIC:   MENTAL STATUS:   I administered the [place]men Short Test of Mental Status, with scores on the subcomponents as follows:   ORIENTATION:  6/8 (gives the year as 1980 and the city as [place]).   ATTENTION:  5/7 (Digit span is 5 forward).   IMMEDIATE RECALL:  4/4 (2 trials).   GENERAL INFORMATION:  2/4 (able to name the first and current president, unable to tell me the number of weeks in a year or provide a coherent definition of the word \"island\").     ABSTRACTION:  3/3.   CALCULATION:  1/4 (unable to multiply or divide); states \"41\" to the sum of 29 and 11.   CONSTRUCTION:  2/4 (unable to copy a cube); error with the placement of the hands on a clock drawing task with placing the hour hand on 10 when instructed to draw 10 minutes after 11, although the minute hand is in the right location).   DELAYED RECALL:  2/4.     Total score on the " Kokmen Short Test of Mental Status is 24/38.  Scores in this range are most commonly associated with established dementia.     CRANIAL NERVES:  Visual fields are full to confrontation.  Extraocular movements are intact with no internuclear ophthalmoplegia.  He does have hypomimia and substantially reduced blink rate.  Facial strength is normal.  Hearing is adequate for conversational purposes.  Palate elevation and tongue protrusion are normal.   POWER:  Strength is normal (5/5) in the following muscles/groups bilaterally:  Deltoids, biceps, triceps, wrist extensors, finger extensors, first dorsal interosseous, hip flexors, hamstrings and anterior tibialis.   REFLEXES:  Reflexes are roughly symmetric and within normal limits at biceps, triceps, brachioradialis and ankles.  The right knee reflex is brisker than the left, although this may be secondary to previous left knee surgery.   MOTOR/CEREBELLAR:  I do not see any tremor or other abnormal spontaneous movements today.  Tone does appear somewhat increased in the left arm as compared to the right.  He does not have any marked asymmetry or dampening of rapid alternating movements.  There is no appendicular ataxia on finger-to-nose testing.  There is no pronator drift in the arms.   GAIT:  The patient is able to ambulate unassisted on a flat, level surface without loss of postural stability.  He does not have any prominent retropulsive tendency.  Arm swing on the left does appear mildly reduced.      Assessment/plan:    1.  Dementia  Subjectively, the patient's wife feels that his mental status is declining over time.  His score on office-based testing of mental status is quite remarkably similar to performance over the past 3 years, with scores ranging from 23-26 on the Kokmen instrument throughout this time with little variation in areas of relative strength or weakness identified.  I do suspect that his cognitive impairment is multifactorial.  There may be some  degree of an underlying static encephalopathy based on history of some struggles in school.  There has been a degree of excessive alcohol use in the past that may be contributing as well.      At present, I do not see any strong indication of any autoimmune contributor to his encephalopathy.  There was no dramatic worsening after we discontinued pulse steroids nor any clear evidence of response to such treatment, and I would not favor reinitiating this at the present time.  I do think that the possibility of a neurodegenerative component is there based on the history provided by the patient's wife, although at present, this seems to have been relatively slowly progressive.  I did suggest that he be evaluated by a behavioral neurology specialist at this time and they are open to this.  I am going to ask for a review by Dr. Jae Barragan of our department in this regard.  In the interim, we will address his other symptomatic issues as outlined further below.      2. Cryptogenic generalized epilepsy  This is felt most likely to be a longstanding pattern and likely unrelated to any neurodegenerative process or question of autoimmunity.  A 3-hour video EEG was previously requested by Dr. Owens and we are going to go ahead and arrange that study.  Clearly, if the patient were having frequent subclinical seizure activity, that could affect his cognition.  I suspect that this is relatively unlikely, but we should start with a short-term outpatient EEG to evaluate this possibility.      3.  Parkinsonism  The patient's appearance to gross inspection today is suspicious for some type of parkinsonian phenotype.  He has substantially reduced blink rate and reduced facial expression.  Motor examination in the limbs is more subtle, although he may have some increase in tone and reduced arm swing on the left.  He is on a moderate dose of ropinirole for restless leg syndrome and this conceivably could be masking some degree of  Parkinsonian symptoms.  I do not know how likely a diagnosis of idiopathic Parkinson disease is here, but he has never had a trial of carbidopa/levodopa and I do think it would be reasonable to proceed with that.  I am going to have him start Sinemet at one half tablet by mouth 3 times daily for 1 week and then increase the dose by 1/2 tablet 3 times daily every week until he is up to a dose of 2 tablets 3 times daily.  I will then see him back for a review in 1 month to see if there has been any evidence of a motor response to this medication.  I did explain to the patient and his wife that it is unlikely that either his cognition or any degree of postural instability are going to respond significantly to dopamine replacement therapy even if there was any motor response, but it is possible that treatment this could be of benefit for his bradyphrenia.      This was a prolonged discussion with the patient and his wife today. I spent 48 minutes with them in the office today, with greater than 50% of this time spent in counseling.      ADDENDUM (2018): Video EEG study demonstrated findings consistent with generalized epilepsy, but no electrographic seizures were seen during three hour recording.    MD ELIZABETH Wilson MD             D: 10/26/2018   T: 10/26/2018   MT: AKA      Name:     LEYDI GRACE   MRN:      [id number]        Account:      [id number]   :      1955           Service Date: 10/25/2018      Document: [id number]

## 2018-11-05 ENCOUNTER — ALLIED HEALTH/NURSE VISIT (OUTPATIENT)
Dept: NEUROLOGY | Facility: CLINIC | Age: 63
End: 2018-11-05
Attending: PSYCHIATRY & NEUROLOGY
Payer: COMMERCIAL

## 2018-11-05 DIAGNOSIS — G40.309: ICD-10-CM

## 2018-11-05 NOTE — MR AVS SNAPSHOT
After Visit Summary   11/5/2018    Jose Loco    MRN: [id number]           Patient Information     Date Of Birth          1955        Visit Information        Provider Department      11/5/2018 11:30 AM [facility] EEG 1 [facility] Epilepsy Care        Today's Diagnoses     Cryptogenic generalized epilepsy (H)           Follow-ups after your visit        Your next 10 appointments already scheduled     Nov 29, 2018  9:30 AM CST   (Arrive by 9:15 AM)   Return Multiple Sclerosis with Marco Gipson MD   [facility] Multiple Sclerosis ([facility])    [address]  [address]  [place] [address]   [phone]            Feb 15, 2019  1:30 PM CST   New Patient Visit with Jae Barragan MD   [facility] NEUROSPECIALTIES ([facility])    [address]  [address]  [place] [address]   [phone]            Apr 05, 2019  4:15 PM CDT   (Arrive by 4:00 PM)   Return Seizure with Sandeep Owens MD   [facility] Neurology ([facility])    [address]  3rd Floor  [place] [id number] [phone]              Who to contact     Please call your clinic at [phone] to:    Ask questions about your health    Make or cancel appointments    Discuss your medicines    Learn about your test results    Speak to your doctor            Additional Information About Your Visit        MyChart Information     [employer] is an electronic gateway that provides easy, online access to your medical records. With [employer], you can request a clinic appointment, read your test results, renew a prescription or communicate with your care team.     To sign up for [employer] visit the website at [email or web address]   You will be asked to enter the access code listed below, as well as some personal information. Please follow the directions to create your username and password.     Your access code is:  [email or web address]  Expires: 2/3/2019 11:41 AM     Your access code will  in 90 days. If you need help or a new code, please contact your [facility] or call [phone] for assistance.        Care EveryWhere ID     This is your Care EveryWhere ID. This could be used by other organizations to access your [facility] medical records  [id number]         Blood Pressure from Last 3 Encounters:   No data found for BP    Weight from Last 3 Encounters:   No data found for Wt              Today, you had the following     No orders found for display         Today's Medication Changes          These changes are accurate as of 18 11:59 PM.  If you have any questions, ask your nurse or doctor.               These medicines have changed or have updated prescriptions.        Dose/Directions    levETIRAcetam 500 MG tablet   Commonly known as:  KEPPRA   This may have changed:  additional instructions   Used for:  Cryptogenic generalized epilepsy (H)        Take three po BID   Quantity:  186 tablet   Refills:  11                Primary Care Provider Office Phone # Fax #    Justo Briscoe [phone]       [facility] [address] [address]        Equal Access to Services     [facility] HospitalCLAUDIA AH: Hadii aad ku hadasho Soomaali, waaxda luqadaha, qaybta kaalmada adeegyada, kiera mcmillan . So [facility] [phone].    ATENCIÓN: Si habla español, tiene a walsh disposición servicios gratuitos de asistencia lingüística. Llame al [phone].    We comply with applicable federal civil rights laws and [place] laws. We do not discriminate on the basis of race, color, national origin, age, disability, sex, sexual orientation, or gender identity.            Thank you!     Thank you for choosing [facility]  for your care. Our goal is always to provide you with excellent care. Hearing back from our patients is one way we can continue to  improve our services. Please take a few minutes to complete the written survey that you may receive in the mail after your visit with us. Thank you!             Your Updated Medication List - Protect others around you: Learn how to safely use, store and throw away your medicines at www.disposemymeds.org.          This list is accurate as of 11/5/18 11:59 PM.  Always use your most recent med list.                   Brand Name Dispense Instructions for use Diagnosis    ALBUTEROL SULFATE HFA IN      Inhale 90 mcg into the lungs 1-2 puffs every 4 hours prn.        ALLOPURINOL PO      Take 100 mg by mouth 3 times daily        AMOXICILLIN PO      Take by mouth daily ONLY FOR DENTAL APPOINTMENT: Reported on 3/21/2017        aspirin 81 MG tablet      Take by mouth daily        BACLOFEN PO      Take 10 mg by mouth 2 times daily        carbidopa-levodopa  MG per tablet    SINEMET    180 tablet    Week 1: 1/2 tablet three times daily Week 2: 1 tablet 3 times daily Week 3: 1.5 tabs 3 times daily Week 4: 2 tabs three times daily    Parkinsonism, unspecified Parkinsonism type (H)       clotrimazole 1 % cream    LOTRIMIN     Apply topically 2 times daily Reported on 3/21/2017        COMPRESSION STOCKINGS      1 each        cyanocobalamin 1000 MCG/ML injection    VITAMIN B12     Inject 1 mL into the muscle every 30 days        DIPHENHYDRAMINE HCL EX      Externally apply 2 % topically as needed Reported on 3/21/2017        donepezil 10 MG tablet    ARICEPT    30 tablet    Take 1 tablet (10 mg) by mouth At Bedtime    Idiopathic dementia, without behavioral disturbance       FLONASE NA      Spray 50 mcg in nostril Reported on 3/21/2017        GABAPENTIN PO      Take 800 mg by mouth 3 times daily        HM VITAMIN D3 2000 units Caps   Generic drug:  cholecalciferol     100 capsule    TAKE TWO TABLETS BY MOUTH ONCE EVERY DAY daily    Vitamin D deficiency       HYDROcodone-acetaminophen 5-325 MG per tablet    NORCO     as needed  "       IBUPROFEN PO      Take 800 mg by mouth as needed Reported on 3/21/2017        levETIRAcetam 500 MG tablet    KEPPRA    186 tablet    Take three po BID    Cryptogenic generalized epilepsy (H)       lidocaine 5 % Patch    LIDODERM     Place 1 patch onto the skin every 24 hours Reported on 3/21/2017        methadone 5 MG tablet    DOLOPHINE     continuous prn        NAFTIN 2 % Gel   Generic drug:  Naftifine      Apply topically daily Reported on 3/21/2017        NEUPRO 3 MG/24HR Pt24   Generic drug:  Rotigotine      daily        other medical supplies      Cane (device) For home use. X 12 weeks.        PAXIL PO      Take 20 mg by mouth daily        potassium chloride SA 20 MEQ CR tablet    K-DUR/KLOR-CON M     daily        rOPINIRole 1 MG tablet    REQUIP     4 times daily        SIMVASTATIN PO      Take 40 mg by mouth At Bedtime        TRAZODONE HCL PO      Take 1 tablet by mouth 3 times daily        triamcinolone 0.5 % cream    KENALOG     Apply topically 3 times daily Reported on 3/21/2017        UNABLE TO FIND      Diabetic shoes DX: Diabetic Neuropathy        UNABLE TO FIND      (order) P7 - CROW/CLON/GEORGETTE/IMIP/LIDO/PENT - 10/0.2/6/3/5/3% Apply 1-2 gms to affected area 3-4x per day. Rub in for 1-2 minutes.        UNABLE TO FIND      Syringe w/ Needle, Disposable - 3 ML (syringe) 22 x 1\" As directed.        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

## 2018-11-07 NOTE — PROCEDURES
VIDEO EEG #:  [id number]      DATE OF STUDY:  11/05/2018        TYPE OF STUDY:  Outpatient video EEG monitoring.      DURATION OF RECORDING:  3 hours 7 minutes 52 seconds      HISTORY:  A 62 year old being evaluated for seizures.  Wife reports spells of staring and alteration of mental status.  There are also spells of stiffening, collapse and jerking that are probable tonic-clonic seizures.  He is being treated with levetiracetam.  There has been worsening of the spells of staring and alteration of mental status.        TECHNICAL SUMMARY:  Video EEG was recorded from scalp electrodes placed according to the 10-20 international system.  Additional electrodes were utilized for referencing, artifact detection and recording from other cerebral regions.  Video was continuously recorded.  Video was reviewed for clinical correlation and to assist with EEG interpretation.      FINDINGS:  While awake, 9-10 Hz posterior dominant rhythm, symmetrical, reactive.  No focal or diffuse slowing.  Sleep is recorded with irregular delta.  Sleep spindles are not noted in spite of a fairly long sleep recording.  Hyperventilation is performed and does not induce any abnormalities.  Within 300 msec of termination of photic stimulation at 5 Hz, 2 generalized spike waves  by about 375 msec are noted.  This is not seen following other rates of photic stimulation.      OTHER INTERICTAL ABNORMALITIES:  Occasional generalized spikes noted, mostly during sleep.  Serial spikes are seen on only one other occasion at 14:45:58 when 2 generalized spike-wave complexes are  by about 600 msec during a period of drowsiness.      IMPRESSION:  Abnormal.  Generalized spikes and rare brief spike-wave bursts are noted.  These findings indicate a generalized seizure tendency and would support a diagnosis of generalized epilepsy.  Seizures were not recorded.  Background and posterior dominant rhythm were within normal limits.         JUNIOR  KURTIS MENA MD             D: 2018   T: 2018   MT: rick      Name:     LEYDI GRACE   MRN:      [id number]        Account:        [id number]   :      1955           Procedure Date: 2018      Document: [id number]

## 2018-11-29 ENCOUNTER — OFFICE VISIT (OUTPATIENT)
Dept: NEUROLOGY | Facility: CLINIC | Age: 63
End: 2018-11-29
Attending: PSYCHIATRY & NEUROLOGY
Payer: MEDICARE

## 2018-11-29 VITALS
SYSTOLIC BLOOD PRESSURE: 136 MMHG | HEIGHT: 67 IN | HEART RATE: 48 BPM | BODY MASS INDEX: 34.31 KG/M2 | WEIGHT: 218.6 LBS | DIASTOLIC BLOOD PRESSURE: 77 MMHG

## 2018-11-29 DIAGNOSIS — G40.309: ICD-10-CM

## 2018-11-29 DIAGNOSIS — G20.C PARKINSONISM, UNSPECIFIED PARKINSONISM TYPE (H): Primary | ICD-10-CM

## 2018-11-29 DIAGNOSIS — F03.90 DEMENTIA WITHOUT BEHAVIORAL DISTURBANCE, UNSPECIFIED DEMENTIA TYPE: ICD-10-CM

## 2018-11-29 PROCEDURE — G0463 HOSPITAL OUTPT CLINIC VISIT: HCPCS | Mod: ZF

## 2018-11-29 RX ORDER — CARBIDOPA AND LEVODOPA 25; 100 MG/1; MG/1
TABLET ORAL
Qty: 180 TABLET | Refills: 5 | Status: SHIPPED | OUTPATIENT
Start: 2018-11-29 | End: 2019-03-15

## 2018-11-29 ASSESSMENT — PAIN SCALES - GENERAL: PAINLEVEL: NO PAIN (0)

## 2018-11-29 NOTE — PATIENT INSTRUCTIONS
1. We will refer you to the movement disorders clinic for an evaluation of possible diagnosis of Parkinson's disease    2. Keep appointments with Dr. Barragan (Behavioral Neurology) and Dr. Owens (Epilepsy) as scheduled    3. Return to this clinic as needed

## 2018-11-29 NOTE — MR AVS SNAPSHOT
After Visit Summary   11/29/2018    Jose Loco    MRN: [id number]           Patient Information     Date Of Birth          1955        Visit Information        Provider Department      11/29/2018 3:30 PM Marco Gipson MD [facility] Multiple Sclerosis        Today's Diagnoses     Parkinsonism, unspecified Parkinsonism type (H)          Care Instructions    1. We will refer you to the movement disorders clinic for an evaluation of possible diagnosis of Parkinson's disease    2. Keep appointments with Dr. Barragan (Behavioral Neurology) and Dr. Owens (Epilepsy) as scheduled    3. Return to this clinic as needed          Follow-ups after your visit        Additional Services     NEUROLOGY ADULT REFERRAL       Your provider has referred you for the following:   Consult at [facility]: Neurology Clinic - [place] [phone]   [email or web address]  Movement Disorder    Please be aware that coverage of these services is subject to the terms and limitations of your health insurance plan.  Call member services at your health plan with any benefit or coverage questions.      Please bring the following with you to your appointment:    (1) Any X-Rays, CTs or MRIs which have been performed.  Contact the facility where they were done to arrange for  prior to your scheduled appointment.    (2) List of current medications  (3) This referral request   (4) Any documents/labs given to you for this referral                  Your next 10 appointments already scheduled     Jan 22, 2019  1:40 PM CST   (Arrive by 1:25 PM)   New Movement Disorder with Garry Ochoa MD   [facility] Neurology ([facility])    [address]  3rd Floor  [place] [address] [phone]            Feb 15, 2019  1:30 PM CST   New Patient Visit with Jae Barragan MD   [facility] NEUROSPECIALTIES ([facility])    [address]  [address]  [place] [facility] [place] [address]   [phone]            2019  4:15 PM CDT   (Arrive by 4:00 PM)   Return Seizure with Sandeep Owens MD   [facility] ([facility])    [address]  3rd [address]  [place] [address]   [phone]              Future tests that were ordered for you today     Open Future Orders        Priority Expected Expires Ordered    NEUROLOGY ADULT REFERRAL Routine 2018            Who to contact     If you have questions or need follow up information about today's clinic visit or your schedule please contact [facility] directly at [phone].  Normal or non-critical lab and imaging results will be communicated to you by [employer], letter or phone within 4 business days after the clinic has received the results. If you do not hear from us within 7 days, please contact the clinic through [employer] or phone. If you have a critical or abnormal lab result, we will notify you by phone as soon as possible.  Submit refill requests through [employer] or call your pharmacy and they will forward the refill request to us. Please allow 3 business days for your refill to be completed.          Additional Information About Your Visit        [employer] Information     [employer] lets you send messages to your doctor, view your test results, renew your prescriptions, schedule appointments and more. To sign up, go to [email or web address] . Click on \"Log in\" on the left side of the screen, which will take you to the Welcome page. Then click on \"Sign up Now\" on the right side of the page.     You will be asked to enter the access code listed below, as well as some personal information. Please follow the directions to create your username and password.     Your access code is: [email or web address]  Expires: 2/3/2019 11:41 AM     Your access code will  in 90 days. If you need help or a new code, please " "call your [facility] or [phone].        Care EveryWhere ID     This is your Care EveryWhere ID. This could be used by other organizations to access your [facility] medical records  [id number]        Your Vitals Were     Pulse Height BMI (Body Mass Index)             48 1.702 m (5' 7\") 34.24 kg/m2          Blood Pressure from Last 3 Encounters:   11/29/18 136/77   10/05/18 133/76   09/08/17 151/70    Weight from Last 3 Encounters:   11/29/18 99.2 kg (218 lb 9.6 oz)   10/25/18 98.6 kg (217 lb 4.8 oz)   10/05/18 100.6 kg (221 lb 12.8 oz)                 Today's Medication Changes          These changes are accurate as of 11/29/18  4:58 PM.  If you have any questions, ask your nurse or doctor.               These medicines have changed or have updated prescriptions.        Dose/Directions    carbidopa-levodopa  MG tablet   Commonly known as:  SINEMET   This may have changed:  additional instructions   Used for:  Parkinsonism, unspecified Parkinsonism type (H)   Changed by:  Marco Gipson MD        Take 2 tablets three times daily   Quantity:  180 tablet   Refills:  5       levETIRAcetam 500 MG tablet   Commonly known as:  KEPPRA   This may have changed:  additional instructions   Used for:  Cryptogenic generalized epilepsy (H)        Take three po BID   Quantity:  186 tablet   Refills:  11            Where to get your medicines      These medications were sent to [place] - [place] - [address]  [address], [facility] [address]     Phone:  [phone]     carbidopa-levodopa  MG tablet                Primary Care Provider Office Phone # Fax #    Justo Briscoe [phone]       [facility] [address] [address]        Equal Access to Services     DENNIS GOLD AH: Hadii aad ku hadasho Soomaali, waaxda luqadaha, qaybta kaalmada adeegcassandra, kiera kapadia " la'farrah alcala. So [facility] [phone].    ATENCIÓN: Si habla jefferson, tiene a walsh disposición servicios gratuitos de asistencia lingüística. Malathi al [phone].    We comply with applicable federal civil rights laws and [place] laws. We do not discriminate on the basis of race, color, national origin, age, disability, sex, sexual orientation, or gender identity.            Thank you!     Thank you for choosing [facility]  for your care. Our goal is always to provide you with excellent care. Hearing back from our patients is one way we can continue to improve our services. Please take a few minutes to complete the written survey that you may receive in the mail after your visit with us. Thank you!             Your Updated Medication List - Protect others around you: Learn how to safely use, store and throw away your medicines at www.disposemymeds.org.          This list is accurate as of 11/29/18  4:58 PM.  Always use your most recent med list.                   Brand Name Dispense Instructions for use Diagnosis    ALBUTEROL SULFATE HFA IN      Inhale 90 mcg into the lungs 1-2 puffs every 4 hours prn.        ALLOPURINOL PO      Take 100 mg by mouth 3 times daily        AMOXICILLIN PO      Take by mouth daily ONLY FOR DENTAL APPOINTMENT: Reported on 3/21/2017        aspirin 81 MG tablet    ASA     Take by mouth daily        BACLOFEN PO      Take 10 mg by mouth 2 times daily        carbidopa-levodopa  MG tablet    SINEMET    180 tablet    Take 2 tablets three times daily    Parkinsonism, unspecified Parkinsonism type (H)       clotrimazole 1 % external cream    LOTRIMIN     Apply topically 2 times daily Reported on 3/21/2017        COMPRESSION STOCKINGS      1 each        DIPHENHYDRAMINE HCL EX      Externally apply 2 % topically as needed Reported on 3/21/2017        donepezil 10 MG tablet    ARICEPT    30 tablet    Take 1 tablet (10 mg) by mouth At Bedtime    Idiopathic dementia, without  "behavioral disturbance       FLONASE NA      Spray 50 mcg in nostril Reported on 3/21/2017        GABAPENTIN PO      Take 800 mg by mouth 3 times daily        HM VITAMIN D3 2000 units Caps   Generic drug:  cholecalciferol     100 capsule    TAKE TWO TABLETS BY MOUTH ONCE EVERY DAY daily    Vitamin D deficiency       HYDROcodone-acetaminophen 5-325 MG tablet    NORCO     as needed        IBUPROFEN PO      Take 800 mg by mouth as needed Reported on 3/21/2017        levETIRAcetam 500 MG tablet    KEPPRA    186 tablet    Take three po BID    Cryptogenic generalized epilepsy (H)       lidocaine 5 % patch    LIDODERM     Place 1 patch onto the skin every 24 hours Reported on 3/21/2017        methadone 5 MG tablet    DOLOPHINE     continuous prn        NAFTIN 2 % Gel   Generic drug:  Naftifine      Apply topically daily Reported on 3/21/2017        NEUPRO 3 MG/24HR Pt24   Generic drug:  Rotigotine      daily        other medical supplies      Cane (device) For home use. X 12 weeks.        PAXIL PO      Take 20 mg by mouth daily        potassium chloride ER 20 MEQ CR tablet    K-DUR/KLOR-CON M     daily        rOPINIRole 1 MG tablet    REQUIP     4 times daily        SIMVASTATIN PO      Take 40 mg by mouth At Bedtime        TRAZODONE HCL PO      Take 1 tablet by mouth 3 times daily        triamcinolone 0.5 % external cream    KENALOG     Apply topically 3 times daily Reported on 3/21/2017        UNABLE TO FIND      Diabetic shoes DX: Diabetic Neuropathy        UNABLE TO FIND      (order) P7 - CROW/CLON/GEORGETTE/IMIP/LIDO/PENT - 10/0.2/6/3/5/3% Apply 1-2 gms to affected area 3-4x per day. Rub in for 1-2 minutes.        UNABLE TO FIND      Syringe w/ Needle, Disposable - 3 ML (syringe) 22 x 1\" As directed.        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

## 2018-11-29 NOTE — LETTER
"11/29/2018      RE: Jose Loco  [address] [facility] [address]     Date of service: November 29, 2018     Referral source: Established patient.      Chief complaint: Dementia.      History of the Present Illness: Mr. Jose Loco is a 62-year-old right-handed man who returns to the Neuroimmunology Clinic today for follow-up after a 1-month trial of levodopa.      The patient's complex neurologic history is as per my previous notes.  I began seeing him in 2015 for an approximately 5-year history of slowly progressive cognitive decline and behavioral change.  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  We did treat the patient with monthly pulse steroids for a period of time without any convincing change in his overall status or formal neuropsychological evaluations.  There was no abrupt change in his cognition after this treatment was discontinued either.      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  He did recently undergo a 3-hour video EEG at the request of Dr. Owens of the epilepsy section that demonstrated generalized spikes and rare brief spike-wave bursts.  There were no ictal electrographic seizures and the background rhythm appeared to be within normal limits.      When I last saw the patient in October after a delay of more than 1 year, I was struck by an increase in parkinsonian symptoms, most prominently hypomimia, which likely had been present for some time, but appeared worse.  The patient's wife also related that she was noticing an increasing tendency for his gait to \"shuffle.\"  Interpretation of possible parkinsonism is somewhat " confounded as the patient was already on ropinirole for restless leg syndrome.  I decided to attempt a trial of carbidopa/levodopa therapy and then see him back in 1 month for review, and he returns today for that purpose.     Today, they report that he is up to carbidopa/levodopa 50/200 mg 3 times daily.  They feel that there has been a definite response.  The patient's wife notes that his gait is noticeably different on this medication.  She also feels that he is more conversant.  As before, the patient is a somewhat limited historian, but he relates that he feels better on this medication as well.  I also asked them if they had noticed a difference in him at times when he may not have taken the medication for one reason or another, and they also relate that there is a noticeable difference in his motor function when he has not taken levodopa.      Past Medical History:  1.  Alcohol use disorder.   2.  Iron deficiency anemia.   3.  Obstructive sleep apnea.   4.  Peripheral neuropathy.   5.  Gout.   6.  Status post gastric bypass surgery.   7.  Diabetes mellitus.   8.  Status post left total knee arthroplasty in 2008.   9.  Status post left total hip arthroplasty.      PHYSICAL EXAMINATION:   VITAL SIGNS:  Blood pressure 136/77; pulse 48; weight 99.2 kg; height 1.7 meters.   GENERAL:  Overweight man who presents to the examination accompanied by his wife, awake and alert and in no acute distress.  I do think that there is substantially more variability in the patient's facial expressions today, although he still has prominent decreased blink rate.     NEUROLOGIC:   MENTAL STATUS:  The patient is awake and alert.  He is oriented to person, place and general situation, although not the specific date.  There is relative poverty of verbal output.   CRANIAL NERVES:  Visual fields are full to confrontation.  Extraocular movements are intact with no internuclear ophthalmoplegia.  Facial strength is normal.  Hearing is  "adequate for conversational purposes.  Palate elevation and tongue protrusion are normal.   POWER:  Strength is normal (5/5) in the following muscle/groups bilaterally:  deltoids, biceps, triceps, wrist extensors, finger extensors, first dorsal interosseous, hip flexors, hamstrings and anterior tibialis.   REFLEXES:  Reflexes are roughly symmetric and within normal limits at biceps, triceps, brachioradialis, knees and ankles.   MOTOR/CEREBELLAR:  He did not have any noticeable tremor or other abnormal spontaneous movements today.  I do not think that tone is convincingly increased in the left arm to passive range of motion today.  Rapid alternating movements appeared symmetric in the hands and feet.  He did not have any appendicular ataxia on finger-to-nose testing.  There is no pronator drift in the arms.   GAIT:  The patient continues to be able to ambulate unassisted without loss of postural stability.  Left arm swing appeared slightly reduced, also likely improved from previous examination.      Assessment/plan:    1.  Parkinsonism, suspect idiopathic Parkinson disease   2.  Dementia  The patient does have definite parkinsonian features on examination, and these are subjectively and I believe objectively improved after a trial of dopamine replacement therapy.  I think that this is most suggestive of a diagnosis of idiopathic Parkinson disease, although it should be acknowledged that some patients with \"Parkinson-plus\" syndromes do show a degree of response to dopamine replacement therapy.  However, the patient has not had any falls, alien limb phenomenon, prominent dysautonomia, or other findings specifically suggestive of one of these variants.     At the present time, I would recommend that he continue carbidopa/levodopa.  I offered them a review in the Movement Clinic and they would like to pursue this, and we will so arrange.     The patient also has an evaluation scheduled with Dr. Barragan in the Behavioral " Neurology Clinic.  I no longer think it at all likely that there is an autoimmune encephalopathy here and in light of the above findings, I think that his cognitive impairment is most likely on a neurodegenerative basis.  However, there are some additional complexities to the situation including longstanding history of alcohol use as well as a history of the patient having required some assistance with special classes in high school and I told them that I would suggest that we proceed with the review in behavioral neurology.    3.  Cryptogenic generalized epilepsy  There was recently some question of possible increase in staring spells.  A recent 3-hour video EEG requested by Dr. Owens did not demonstrate any electrographic seizures.  I briefly discussed with him that if he were to continue to have increase in frequency of spells, additional monitoring such as ambulatory EEG or admission for seizure classification on video EEG could be considered, but at present we will just continue his current dose of levetiracetam 1500 mg twice daily and he will follow up with Dr. Owens in April as scheduled.      At the present time, return to this clinic is going to be on an as-needed basis.  I discussed with them that the patient will require ongoing follow-up in the Epilepsy Clinic and likely should be seen by a second neurologist as well regarding parkinsonism and cognitive impairment, most likely in the Movement Disorders Clinic.  I am, however, happy to see him back at any time if there are questions about possible neurologic autoimmunity, or other issues as per their preference.     Maroc Gipson MD   of Neurology  [facility]    Cc:  Justo Briscoe MD (PCP)  Brian Owens MD (Neurology-Epilepsy)  Patient

## 2018-12-05 NOTE — PROGRESS NOTES
"Date of service: November 29, 2018     Referral source: Established patient.      Chief complaint: Dementia.      History of the Present Illness: Mr. Jose Loco is a 62-year-old right-handed man who returns to the Neuroimmunology Clinic today for follow-up after a 1-month trial of levodopa.      The patient's complex neurologic history is as per my previous notes.  I began seeing him in 2015 for an approximately 5-year history of slowly progressive cognitive decline and behavioral change.  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  We did treat the patient with monthly pulse steroids for a period of time without any convincing change in his overall status or formal neuropsychological evaluations.  There was no abrupt change in his cognition after this treatment was discontinued either.      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  He did recently undergo a 3-hour video EEG at the request of Dr. Owens of the epilepsy section that demonstrated generalized spikes and rare brief spike-wave bursts.  There were no ictal electrographic seizures and the background rhythm appeared to be within normal limits.      When I last saw the patient in October after a delay of more than 1 year, I was struck by an increase in parkinsonian symptoms, most prominently hypomimia, which likely had been present for some time, but appeared worse.  The patient's wife also related that she was noticing an increasing tendency for his gait to \"shuffle.\"  Interpretation of possible parkinsonism is somewhat confounded as the patient was already on ropinirole for restless leg syndrome.  I " decided to attempt a trial of carbidopa/levodopa therapy and then see him back in 1 month for review, and he returns today for that purpose.     Today, they report that he is up to carbidopa/levodopa 50/200 mg 3 times daily.  They feel that there has been a definite response.  The patient's wife notes that his gait is noticeably different on this medication.  She also feels that he is more conversant.  As before, the patient is a somewhat limited historian, but he relates that he feels better on this medication as well.  I also asked them if they had noticed a difference in him at times when he may not have taken the medication for one reason or another, and they also relate that there is a noticeable difference in his motor function when he has not taken levodopa.      Past Medical History:  1.  Alcohol use disorder.   2.  Iron deficiency anemia.   3.  Obstructive sleep apnea.   4.  Peripheral neuropathy.   5.  Gout.   6.  Status post gastric bypass surgery.   7.  Diabetes mellitus.   8.  Status post left total knee arthroplasty in 2008.   9.  Status post left total hip arthroplasty.      PHYSICAL EXAMINATION:   VITAL SIGNS:  Blood pressure 136/77; pulse 48; weight 99.2 kg; height 1.7 meters.   GENERAL:  Overweight man who presents to the examination accompanied by his wife, awake and alert and in no acute distress.  I do think that there is substantially more variability in the patient's facial expressions today, although he still has prominent decreased blink rate.     NEUROLOGIC:   MENTAL STATUS:  The patient is awake and alert.  He is oriented to person, place and general situation, although not the specific date.  There is relative poverty of verbal output.   CRANIAL NERVES:  Visual fields are full to confrontation.  Extraocular movements are intact with no internuclear ophthalmoplegia.  Facial strength is normal.  Hearing is adequate for conversational purposes.  Palate elevation and tongue protrusion are  "normal.   POWER:  Strength is normal (5/5) in the following muscle/groups bilaterally:  deltoids, biceps, triceps, wrist extensors, finger extensors, first dorsal interosseous, hip flexors, hamstrings and anterior tibialis.   REFLEXES:  Reflexes are roughly symmetric and within normal limits at biceps, triceps, brachioradialis, knees and ankles.   MOTOR/CEREBELLAR:  He did not have any noticeable tremor or other abnormal spontaneous movements today.  I do not think that tone is convincingly increased in the left arm to passive range of motion today.  Rapid alternating movements appeared symmetric in the hands and feet.  He did not have any appendicular ataxia on finger-to-nose testing.  There is no pronator drift in the arms.   GAIT:  The patient continues to be able to ambulate unassisted without loss of postural stability.  Left arm swing appeared slightly reduced, also likely improved from previous examination.      Assessment/plan:    1.  Parkinsonism, suspect idiopathic Parkinson disease   2.  Dementia  The patient does have definite parkinsonian features on examination, and these are subjectively and I believe objectively improved after a trial of dopamine replacement therapy.  I think that this is most suggestive of a diagnosis of idiopathic Parkinson disease, although it should be acknowledged that some patients with \"Parkinson-plus\" syndromes do show a degree of response to dopamine replacement therapy.  However, the patient has not had any falls, alien limb phenomenon, prominent dysautonomia, or other findings specifically suggestive of one of these variants.     At the present time, I would recommend that he continue carbidopa/levodopa.  I offered them a review in the Movement Clinic and they would like to pursue this, and we will so arrange.     The patient also has an evaluation scheduled with Dr. Barragan in the Behavioral Neurology Clinic.  I no longer think it at all likely that there is an autoimmune " encephalopathy here and in light of the above findings, I think that his cognitive impairment is most likely on a neurodegenerative basis.  However, there are some additional complexities to the situation including longstanding history of alcohol use as well as a history of the patient having required some assistance with special classes in high school and I told them that I would suggest that we proceed with the review in behavioral neurology.    3.  Cryptogenic generalized epilepsy  There was recently some question of possible increase in staring spells.  A recent 3-hour video EEG requested by Dr. Owens did not demonstrate any electrographic seizures.  I briefly discussed with him that if he were to continue to have increase in frequency of spells, additional monitoring such as ambulatory EEG or admission for seizure classification on video EEG could be considered, but at present we will just continue his current dose of levetiracetam 1500 mg twice daily and he will follow up with Dr. Owens in April as scheduled.      At the present time, return to this clinic is going to be on an as-needed basis.  I discussed with them that the patient will require ongoing follow-up in the Epilepsy Clinic and likely should be seen by a second neurologist as well regarding parkinsonism and cognitive impairment, most likely in the Movement Disorders Clinic.  I am, however, happy to see him back at any time if there are questions about possible neurologic autoimmunity, or other issues as per their preference.         ELIZABETH LOGAN MD             D: 2018   T: 2018   MT: [id number]      Name:     LEYDI GRACE   MRN:      [id number]        Account:      [id number]   :      1955           Service Date: 2018      Document: [id number]

## 2019-01-04 NOTE — TELEPHONE ENCOUNTER
FUTURE VISIT INFORMATION      FUTURE VISIT INFORMATION:    Date: 1/22/19    Time: 1.40p    Location: [facility] [place]  REFERRAL INFORMATION:    Referring provider:  Dr. Marco Gipson    Referring providers clinic:  [facility]    Reason for visit/diagnosis  Parkinson's    RECORDS REQUESTED FROM:       Clinic name Comments Records Status Imaging Status   [facility] Dr. Gipson 11/29/18 OV Internal                                      1/4/19: Internal referral from Dr. Gipson at [facility]. Records in [facility].

## 2019-01-15 ENCOUNTER — TELEPHONE (OUTPATIENT)
Dept: NEUROLOGY | Facility: CLINIC | Age: 64
End: 2019-01-15

## 2019-01-15 NOTE — TELEPHONE ENCOUNTER
MTM referral from: [facility] visit (referral by provider)    MTM referral outreach attempt #1 on January 15, 2019 at 3:37 PM      Outcome: Patient is not interested at this time because there insurance does not cover MTM, will route to MTM Pharmacist/Provider as an FYI. Thank you for the referral.     Emily Joseph, MTM Coordinator

## 2019-01-16 ENCOUNTER — PATIENT OUTREACH (OUTPATIENT)
Dept: CARE COORDINATION | Facility: CLINIC | Age: 64
End: 2019-01-16

## 2019-01-22 ENCOUNTER — PRE VISIT (OUTPATIENT)
Dept: NEUROLOGY | Facility: CLINIC | Age: 64
End: 2019-01-22

## 2019-02-15 ENCOUNTER — OFFICE VISIT (OUTPATIENT)
Dept: NEUROLOGY | Facility: CLINIC | Age: 64
End: 2019-02-15
Payer: COMMERCIAL

## 2019-02-15 VITALS
WEIGHT: 217.2 LBS | SYSTOLIC BLOOD PRESSURE: 144 MMHG | TEMPERATURE: 98 F | DIASTOLIC BLOOD PRESSURE: 89 MMHG | BODY MASS INDEX: 34.02 KG/M2 | HEART RATE: 54 BPM

## 2019-02-15 DIAGNOSIS — F03.91 DEMENTIA WITH BEHAVIORAL DISTURBANCE, UNSPECIFIED DEMENTIA TYPE: Primary | ICD-10-CM

## 2019-02-15 RX ORDER — LORAZEPAM 0.5 MG/1
0.5 TABLET ORAL 2 TIMES DAILY
Status: ACTIVE | OUTPATIENT
Start: 2019-02-18 | End: 2019-02-19
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
## 2019-02-15 NOTE — PATIENT INSTRUCTIONS
You saw saw Dr. Jae Barragan at the [facility] for an evaluation of memory loss, seizures, motor and behavioral changes    Recommendations:    Continue current medications. Would be helpful to limit opiate containing medications in the future.     Limit beer to one or less a day, do not drink after 5p.    Brain MRI to get an idea of any changes since the last brain MRI was performed    Consider genetic workup at next visit    Reassess need for donepezil after viewing MRI. Donepezil can help memory but can also worsen behaviors in some cases and can cause diarrhea. Prescribed Ativan to help calm him for the imaging. Do not use hydocodone or methadone on same day as taking Ativan.    Considering repeating EEG in the future as needed. Follow up with Dr. Patiño    Return to clinic in 6 months. Dr Barragan will be in touch with you as needed in the meantime.     Research opportunities:  1. [facility] Caregiver Registry (forms available in our waiting room)  2. [employer] TrialMatch:  http://www.alz.org/research/clinical_trials/find_clinical_trials_trialmatch.asp  [email or web address] ClinicalTrials.gov

## 2019-03-07 ENCOUNTER — RECORDS - HEALTHEAST (OUTPATIENT)
Dept: ADMINISTRATIVE | Facility: OTHER | Age: 64
End: 2019-03-07
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
## 2019-03-15 ENCOUNTER — OFFICE VISIT (OUTPATIENT)
Dept: NEUROLOGY | Facility: CLINIC | Age: 64
End: 2019-03-15
Attending: PSYCHIATRY & NEUROLOGY
Payer: COMMERCIAL

## 2019-03-15 ENCOUNTER — TRANSFERRED RECORDS (OUTPATIENT)
Dept: HEALTH INFORMATION MANAGEMENT | Facility: CLINIC | Age: 64
End: 2019-03-15

## 2019-03-15 VITALS
HEIGHT: 67 IN | SYSTOLIC BLOOD PRESSURE: 134 MMHG | BODY MASS INDEX: 34.81 KG/M2 | WEIGHT: 221.8 LBS | OXYGEN SATURATION: 95 % | HEART RATE: 78 BPM | TEMPERATURE: 95.8 F | DIASTOLIC BLOOD PRESSURE: 58 MMHG

## 2019-03-15 DIAGNOSIS — G25.81 RESTLESS LEGS SYNDROME (RLS): ICD-10-CM

## 2019-03-15 DIAGNOSIS — E55.9 VITAMIN D DEFICIENCY: ICD-10-CM

## 2019-03-15 DIAGNOSIS — G20.C PARKINSONISM, UNSPECIFIED PARKINSONISM TYPE (H): Primary | ICD-10-CM

## 2019-03-15 DIAGNOSIS — G62.9 NEUROPATHY: ICD-10-CM

## 2019-03-15 RX ORDER — FUROSEMIDE 40 MG
TABLET ORAL
Refills: 3 | COMMUNITY
Start: 2019-03-15 | End: 2020-11-24

## 2019-03-15 RX ORDER — CLOTRIMAZOLE 1 %
CREAM (GRAM) TOPICAL DAILY PRN
COMMUNITY
Start: 2019-03-15 | End: 2023-09-29

## 2019-03-15 RX ORDER — TRAMADOL HYDROCHLORIDE 50 MG/1
TABLET ORAL
Qty: 1 TABLET | Refills: 0 | COMMUNITY
Start: 2019-03-15 | End: 2020-11-24

## 2019-03-15 RX ORDER — ROPINIROLE 1 MG/1
1 TABLET, FILM COATED ORAL 4 TIMES DAILY
COMMUNITY
Start: 2019-02-03 | End: 2019-03-15

## 2019-03-15 RX ORDER — ROTIGOTINE 3 MG/24H
PATCH, EXTENDED RELEASE TRANSDERMAL
Qty: 90 PATCH | Refills: 3 | COMMUNITY
Start: 2019-03-15 | End: 2020-11-17

## 2019-03-15 RX ORDER — PAROXETINE 40 MG/1
40 TABLET, FILM COATED ORAL AT BEDTIME
Refills: 1 | COMMUNITY
Start: 2019-03-15 | End: 2023-03-28

## 2019-03-15 RX ORDER — LORAZEPAM 1 MG/1
TABLET ORAL
Qty: 1 TABLET | Refills: 0 | COMMUNITY
Start: 2019-03-15 | End: 2019-03-15

## 2019-03-15 RX ORDER — LORAZEPAM 1 MG/1
TABLET ORAL
Qty: 2 TABLET | Refills: 0 | Status: SHIPPED | OUTPATIENT
Start: 2019-03-15 | End: 2019-08-16

## 2019-03-15 RX ORDER — METHADONE HYDROCHLORIDE 5 MG/1
TABLET ORAL
Qty: 1 TABLET | Refills: 0 | COMMUNITY
Start: 2019-03-15

## 2019-03-15 RX ORDER — LIDOCAINE 50 MG/G
1 PATCH TOPICAL DAILY PRN
COMMUNITY
Start: 2019-03-15

## 2019-03-15 RX ORDER — TRAMADOL HYDROCHLORIDE 50 MG/1
TABLET ORAL
Qty: 1 TABLET | Refills: 0 | COMMUNITY
Start: 2019-03-15 | End: 2019-03-15

## 2019-03-15 RX ORDER — POTASSIUM CHLORIDE 1500 MG/1
TABLET, EXTENDED RELEASE ORAL
COMMUNITY
Start: 2019-03-15 | End: 2023-03-28

## 2019-03-15 RX ORDER — LORAZEPAM 1 MG/1
1 TABLET ORAL
COMMUNITY
Start: 2019-03-07 | End: 2019-03-15

## 2019-03-15 RX ORDER — GABAPENTIN 800 MG/1
TABLET ORAL
COMMUNITY
Start: 2019-03-15

## 2019-03-15 RX ORDER — FLUTICASONE PROPIONATE 50 MCG
SPRAY, SUSPENSION (ML) NASAL
COMMUNITY
Start: 2019-03-15

## 2019-03-15 RX ORDER — CARBIDOPA AND LEVODOPA 25; 100 MG/1; MG/1
TABLET ORAL
Qty: 540 TABLET | Refills: 5 | COMMUNITY
Start: 2019-03-15 | End: 2020-11-24

## 2019-03-15 RX ORDER — TRAZODONE HYDROCHLORIDE 50 MG/1
TABLET, FILM COATED ORAL
COMMUNITY
Start: 2019-03-15 | End: 2020-11-24

## 2019-03-15 RX ORDER — ROPINIROLE 1 MG/1
TABLET, FILM COATED ORAL
Qty: 360 TABLET | Refills: 0 | COMMUNITY
Start: 2019-03-15 | End: 2020-11-24

## 2019-03-15 RX ORDER — GABAPENTIN 800 MG/1
800 TABLET ORAL
COMMUNITY
Start: 2019-02-16 | End: 2019-03-15

## 2019-03-15 RX ORDER — BACLOFEN 10 MG/1
TABLET ORAL
Refills: 2 | COMMUNITY
Start: 2019-03-15

## 2019-03-15 ASSESSMENT — PAIN SCALES - GENERAL: PAINLEVEL: NO PAIN (0)

## 2019-03-15 ASSESSMENT — MIFFLIN-ST. JEOR: SCORE: 1754.71

## 2019-03-15 NOTE — PATIENT INSTRUCTIONS
MEDICATION LIST IS NOT CONFIRMED.   FAMILY WILL RECONTACT US as I set him for anahi to communicate with us the correct medications that he is taking     His brain mri images are not available for review today nor is the report    Discussed dopamine imaging such as a datscan - which would require prior authorization    Alcoholic and continues to drink    History of seizures that are under control    Cervical spine injury    Gastric bypass  Vitamin status    neuropathy    History of JULIA antibody finding    RLS    Sleep disorder - sleep apnea and ? PLMS    Obesity.     Chronic leg pain    Depression  - on paxil    Ongoing care  Dr. Rachna Gipson    PLAN  datscan  Continue sinemet  Pharmacy consultation to review RLS regimen  emg to confirm severity of neuropathy    Not sure he has typical parkinson's disease - his wife reports clinical response to sinemet and the presence of intermittent tremor and festination and possibly freezing of gait - so would keep on sinemet for now and hopefully can get scan to help determine if there is an underlying deficiency of dopamine.

## 2019-03-15 NOTE — Clinical Note
3/15/2019       RE: Jose Loco  [address] [facility] [address]     Dear Colleague,    Thank you for referring your patient, Jose Loco, to the [facility] NEUR[facility] at [facility]. Please see a copy of my visit note below.    No notes on file    Again, thank you for allowing me to participate in the care of your patient.      Sincerely,    Garry Ochoa MD
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
## 2019-03-15 NOTE — LETTER
"[facility]  [facility]  [address] [address]  [phone]            March 15, 2019          Dear Andrae Proxy Patient,    We received a request to activate you as a proxy for another patient of [facility] or [place].  In order to do so, we need to activate your [employer] account as well.    Your access code is: [unfilled]      Please access the [employer] website:  -  [employer] [email or web address]  -  [employer] [email or web address].    Below the ID and password fields, select the \"Sign Up Now\" as New User.  You will be prompted to enter the access code listed above as well as additional personal information.  Please follow the directions carefully when creating your username and password.    Once your account is activated, you can access the proxy accounts under \"Shared Medical Records\".    If you allow your access code to , or if you have any questions please call a [employer] Representative during normal clinic hours.     Sincerely,        [employer] Customer Service    "

## 2019-03-19 ENCOUNTER — TELEPHONE (OUTPATIENT)
Dept: NEUROLOGY | Facility: CLINIC | Age: 64
End: 2019-03-19

## 2019-03-19 NOTE — TELEPHONE ENCOUNTER
MTM referral from: [facility] visit (referral by provider)    MTM referral outreach attempt #2 on March 19, 2019 at 3:27 PM      Outcome: Patient not reachable after several attempts, will route to MTM Pharmacist/Provider as an FYI. Thank you for the referral.    Emily Joseph, MTM Coordinator

## 2019-03-25 ENCOUNTER — TELEPHONE (OUTPATIENT)
Dept: NUCLEAR MEDICINE | Facility: CLINIC | Age: 64
End: 2019-03-25

## 2019-03-25 NOTE — TELEPHONE ENCOUNTER
DOS 4/16/19: NM Brain Image Tomographic(Spect) Datscan [[id number]] (Order [id number]) CPT   62864             ICD10: Parkinsonism, unspecified Parkinsonism type (H) [G20]     Spoke with Azam from [facility]. No PA Required. Ref# [id number].

## 2019-04-04 ENCOUNTER — OFFICE VISIT (OUTPATIENT)
Dept: NEUROLOGY | Facility: CLINIC | Age: 64
End: 2019-04-04
Payer: COMMERCIAL

## 2019-04-04 DIAGNOSIS — G62.89 AXONAL SENSORIMOTOR NEUROPATHY: Primary | ICD-10-CM

## 2019-04-04 DIAGNOSIS — G56.21 ULNAR NEUROPATHY AT ELBOW, RIGHT: ICD-10-CM

## 2019-04-04 DIAGNOSIS — G62.9 NEUROPATHY: ICD-10-CM

## 2019-04-04 DIAGNOSIS — G56.01 CARPAL TUNNEL SYNDROME OF RIGHT WRIST: ICD-10-CM

## 2019-04-04 NOTE — PROGRESS NOTES
[facility]  Electrodiagnostic Laboratory    Nerve Conduction & EMG Report            Patient:       Jose Loco  Patient ID:    [id number]  Gender:        Male  YOB: 1955  Age:           63 Years 3 Months      Referring Physician: Garry Ochoa MD    History & Examination:    Mr. Jose Loco is a 63 year old man with a history of alcohol abuse and parkinsonism who presents to EMG lab for evaluation of unsteady gait and abnormal sensory exam. Query generalized polyneuropathy.     Techniques:    Sensory and motor conduction studies were done with surface recording electrodes. EMG was done with a concentric needle electrode.     Results:    The right median antidromic sensory NCS revealed a moderately attenuated SNAP amplitude and moderately reduced conduction velocity. The right ulnar and radial antidromic sensory NCSs were normal. The right superficial peroneal and sural sensory NCSs were absent. The right median motor NCS revealed a prolonged distal motor latency, moderately attenuated CMAP amplitude with no major segmental change, and normal conduction velocity. The right ulnar motor NCS revealed a mildly reduced conduction velocity across the elbow, normal conduction velocity at the forearm, normal distal motor latency and CMAP amplitudes. The right deep peroneal motor NCS was normal. The right tibial motor NCS revealed a moderately attenuated CMAP amplitude with a normal distal motor latency and conduction velocity. The right median to ulnar second lumbrical and palmar interossei comparison motor NCSs revealed a peak latency difference of 2.86 ms (normal <0.5 ms). The right tibial F wave was normal. EMG of the right lower limb revealed 2+ fasciculations in the peroneus tertius, with severely reduced recruitment of long duration, polyphasic MUPs. EMG of the right medial gastrocnemius showed no abnormal spontaneous activity, and some long duration MUPs with normal  recruitment patterns. EMG of the right TA was normal.     Interpretation:    Abnormal study. There was electrophysiologic evidence of 1) moderate, length-dependent, sensiromotor axonal polyneuropathy 2) a severe right median neuropathy at the wrist, as seen in carpal tunnel syndrome 3) a mild right ulnar neuropathy at the elbow.     EMG Physician:    Zaida Adan DO  Neuromuscular Fellow    ATTENDING ADDENDUM: I was present during the entire study and directly supervised Fellow Dr Adan who performed it. I agree with the analysis of results and interpretation as above. Jordan Green MD       Sensory NCS      Nerve / Sites Rec. Site Onset Peak NP Amp Ref. PP Amp Dist Juan Jose Ref. Temp     ms ms  V  V  V cm m/s m/s  C   R MEDIAN - Dig II Anti      Wrist Dig II 4.17 5.31 4.3 10.0 5.7 14 33.6 48.0 34   R ULNAR - Dig V Anti      Wrist Dig V 2.45 3.18 11.4 8.0 13.7 12.5 51.1 48.0 33.9   R RADIAL - Snuff      Forearm Snuff 2.08 2.66 22.9 15.0 26.4 12.5 60.0 48.0 33.4   R superficial peroneal- NR  R sural- NR      Motor NCS      Nerve / Sites Rec. Site Lat Ref. Amp Ref. Rel Amp Dist Juan Jose Ref. Dur. Area Temp.     ms ms mV mV % cm m/s m/s ms %  C   R MEDIAN - APB      Wrist APB 6.67 4.40 3.6 5.0 100 8   5.36 100 34      Elbow APB 11.88  3.5  96.5 25 48.0 48.0 5.57 87.9 34.1   R ULNAR - ADM      Wrist ADM 2.97 3.50 11.4 5.0 100 8   6.30 100 34      B.Elbow ADM 7.29  10.2  89.5 25 57.8 48.0 6.30 93.5 34      A.Elbow ADM 9.22  10.0  87.7 9 46.7 48.0 6.51 94.3 34   R DEEP PERONEAL - EDB 60      Ankle EDB 4.27 6.00 3.5 2.0 100 8   5.36 100 31      FibHead EDB 12.34  3.0  86.9 38 47.1 38.0 5.57 95.9 31.2      Pop Fos EDB 14.06  3.0  85.2 10 58.2 38.0 5.78 97.6 31.3   R TIBIAL - AH      Ankle AH 4.27 6.00 2.0 4.0 100 8   7.81 100 31.6      Pop Fos AH 14.53  1.3  64.6 44 42.9 38.0 7.76 104 31.6   R MEDIAN - II Lumb      Median II Lumb 6.25  0.5  100 10   8.07 100 34.1      Ulnar Palm Int 3.39  1.3  252 10   5.83 208 34.1       F   Wave      Nerve Min F Lat Max F Lat Mean FLat Temp.    ms ms ms  C   R TIBIAL 52.81 56.88 55.22 31.6       EMG Summary Table     Spontaneous MUAP Recruitment    IA Fib/PSW Fasc H.F. Amp Dur. PPP Pattern   R. TIB ANTERIOR N None None None N N N N   R. GASTROCN (MED) N None None None N 1+ N N   R. PERON TERTIUS N None 2+ None N 1+ 1+ Severely Reduced

## 2019-04-04 NOTE — LETTER
4/4/2019       RE: Jose Loco  [address] [facility] [id number]     Dear Colleague,    Thank you for referring your patient, Jose Loco, to the [facility] EMG at [facility]. Please see a copy of my visit note below.        [facility]  Electrodiagnostic Laboratory    Nerve Conduction & EMG Report    Patient:       Jose Loco  Patient ID:    [id number]  Gender:        Male  YOB: 1955  Age:           63 Years 3 Months      Referring Physician: Garry Ochoa MD    History & Examination:    Mr. Jose Loco is a 63 year old man with a history of alcohol abuse and parkinsonism who presents to EMG lab for evaluation of unsteady gait and abnormal sensory exam. Query generalized polyneuropathy.     Techniques:  Sensory and motor conduction studies were done with surface recording electrodes. EMG was done with a concentric needle electrode.     Results:  The right median antidromic sensory NCS revealed a moderately attenuated SNAP amplitude and moderately reduced conduction velocity. The right ulnar and radial antidromic sensory NCSs were normal. The right superficial peroneal and sural sensory NCSs were absent. The right median motor NCS revealed a prolonged distal motor latency, moderately attenuated CMAP amplitude with no major segmental change, and normal conduction velocity. The right ulnar motor NCS revealed a mildly reduced conduction velocity across the elbow, normal conduction velocity at the forearm, normal distal motor latency and CMAP amplitudes. The right deep peroneal motor NCS was normal. The right tibial motor NCS revealed a moderately attenuated CMAP amplitude with a normal distal motor latency and conduction velocity. The right median to ulnar second lumbrical and palmar interossei comparison motor NCSs  revealed a peak latency difference of 2.86 ms (normal <0.5 ms). The right tibial F wave was normal.  EMG of the right lower limb revealed 2+ fasciculations in the peroneus tertius, with severely reduced recruitment of long duration, polyphasic MUPs. EMG of the right medial gastrocnemius showed no abnormal spontaneous activity, and some long duration MUPs with normal recruitment patterns. EMG of the right TA was normal.     Interpretation:  Abnormal study. There was electrophysiologic evidence of 1) moderate, length-dependent, sensiromotor axonal polyneuropathy 2) a severe right median neuropathy at the wrist, as seen in carpal tunnel syndrome 3) a mild right ulnar neuropathy at the elbow.     EMG Physician:  Zaida Adan DO  Neuromuscular Fellow    ATTENDING ADDENDUM: I was present during the entire study and directly supervised Fellow Dr Adan who performed it. I agree with the analysis of results and interpretation as above. Jordan Green MD       Sensory NCS  Nerve / Sites Rec. Site Onset Peak NP Amp Ref. PP Amp Dist Juan Jose Ref. Temp     ms ms  V  V  V cm m/s m/s  C   R MEDIAN - Dig II Anti      Wrist Dig II 4.17 5.31 4.3 10.0 5.7 14 33.6 48.0 34   R ULNAR - Dig V Anti      Wrist Dig V 2.45 3.18 11.4 8.0 13.7 12.5 51.1 48.0 33.9   R RADIAL - Snuff      Forearm Snuff 2.08 2.66 22.9 15.0 26.4 12.5 60.0 48.0 33.4   R superficial peroneal- NR  R sural- NR      Motor NCS      Nerve / Sites Rec. Site Lat Ref. Amp Ref. Rel Amp Dist Juan Jose Ref. Dur. Area Temp.     ms ms mV mV % cm m/s m/s ms %  C   R MEDIAN - APB      Wrist APB 6.67 4.40 3.6 5.0 100 8   5.36 100 34      Elbow APB 11.88  3.5  96.5 25 48.0 48.0 5.57 87.9 34.1   R ULNAR - ADM      Wrist ADM 2.97 3.50 11.4 5.0 100 8   6.30 100 34      B.Elbow ADM 7.29  10.2  89.5 25 57.8 48.0 6.30 93.5 34      A.Elbow ADM 9.22  10.0  87.7 9 46.7 48.0 6.51 94.3 34   R DEEP PERONEAL - EDB 60      Ankle EDB 4.27 6.00 3.5 2.0 100 8   5.36 100 31      FibHead EDB 12.34  3.0  86.9 38 47.1 38.0 5.57 95.9 31.2      Pop Fos EDB 14.06  3.0  85.2 10 58.2 38.0 5.78 97.6 31.3   R TIBIAL  - AH      Ankle AH 4.27 6.00 2.0 4.0 100 8   7.81 100 31.6      Pop Fos AH 14.53  1.3  64.6 44 42.9 38.0 7.76 104 31.6   R MEDIAN - II Lumb      Median II Lumb 6.25  0.5  100 10   8.07 100 34.1      Ulnar Palm Int 3.39  1.3  252 10   5.83 208 34.1       F  Wave      Nerve Min F Lat Max F Lat Mean FLat Temp.    ms ms ms  C   R TIBIAL 52.81 56.88 55.22 31.6       EMG Summary Table     Spontaneous MUAP Recruitment    IA Fib/PSW Fasc H.F. Amp Dur. PPP Pattern   R. TIB ANTERIOR N None None None N N N N   R. GASTROCN (MED) N None None None N 1+ N N   R. PERON TERTIUS N None 2+ None N 1+ 1+ Severely Reduced                              Again, thank you for allowing me to participate in the care of your patient.      Sincerely,    Jordan Green MD

## 2019-04-08 ENCOUNTER — TELEPHONE (OUTPATIENT)
Dept: NEUROLOGY | Facility: CLINIC | Age: 64
End: 2019-04-08

## 2019-04-08 NOTE — TELEPHONE ENCOUNTER
"Nurse received this note from Dr. Ochoa:  Patient has a neuropathy and carpal tunnel syndrome   He will be seeing Dr. Barragan and Roman in May and June   He does not have a return appointment to see me yet. Not sure if he wants to see a hand surgeon about his carpal tunnel. Can you call and see what he would like to do?   Abnormal study. There was electrophysiologic evidence of 1) moderate, length-dependent, sensiromotor axonal polyneuropathy 2) a severe right median neuropathy at the wrist, as seen in carpal tunnel syndrome 3) a mild right ulnar neuropathy at the elbow.     Recommendation:     Regina stated they would like a referral to general neurology regarding a follow up.    Regina stated she would like for him to have someone go through the [email or web address] and touch base with his \"leg issues\" and wanted to know if they could come see Dr. Ochoa again. I informed her that they can definitely follow-up with Dr. Ochoa and he would be happy to go through the [email or web address] with Jose. 5/3 at 3:15 PM work for them for a follow-up appointment.  "

## 2019-04-09 DIAGNOSIS — G62.9 NEUROPATHY: Primary | ICD-10-CM

## 2019-05-23 ENCOUNTER — ANCILLARY PROCEDURE (OUTPATIENT)
Dept: NUCLEAR MEDICINE | Facility: CLINIC | Age: 64
End: 2019-05-23
Attending: PSYCHIATRY & NEUROLOGY
Payer: COMMERCIAL

## 2019-05-23 DIAGNOSIS — G20.C PARKINSONISM, UNSPECIFIED PARKINSONISM TYPE (H): ICD-10-CM

## 2019-05-23 PROCEDURE — 78607 NM BRAIN IMAGING TOMOGRAPHIC (SPECT) DATSCAN: CPT | Performed by: RADIOLOGY

## 2019-05-23 PROCEDURE — A9584 IODINE I-123 IOFLUPANE: HCPCS | Performed by: PSYCHIATRY & NEUROLOGY

## 2019-05-24 PROBLEM — Z01.89 LABORATORY TEST: Status: ACTIVE | Noted: 2019-05-24

## 2019-05-24 RX ORDER — DIPHENHYDRAMINE HYDROCHLORIDE, ZINC ACETATE 2; .1 G/100G; G/100G
CREAM TOPICAL
COMMUNITY

## 2019-08-02 DIAGNOSIS — G20.C PARKINSONISM, UNSPECIFIED PARKINSONISM TYPE (H): ICD-10-CM

## 2019-08-05 RX ORDER — CARBIDOPA AND LEVODOPA 25; 100 MG/1; MG/1
TABLET ORAL
Qty: 180 TABLET | Refills: 5 | Status: SHIPPED | OUTPATIENT
Start: 2019-08-05 | End: 2020-01-15

## 2019-08-05 NOTE — TELEPHONE ENCOUNTER
Last Written Prescription Date:  03/15/19  Last Fill Quantity: 540,  # refills: 5   Last office visit: No previous visit found with prescribing provider:     Future Office Visit:  08/16/19

## 2019-08-05 NOTE — TELEPHONE ENCOUNTER
Last office visit 3/15/2019    Prescription last refilled on 11/29/2018.    Sent to be signed by the provider.

## 2019-08-16 ENCOUNTER — OFFICE VISIT (OUTPATIENT)
Dept: NEUROLOGY | Facility: CLINIC | Age: 64
End: 2019-08-16
Payer: COMMERCIAL

## 2019-08-16 VITALS
WEIGHT: 211.8 LBS | HEART RATE: 51 BPM | BODY MASS INDEX: 33.17 KG/M2 | SYSTOLIC BLOOD PRESSURE: 127 MMHG | DIASTOLIC BLOOD PRESSURE: 75 MMHG

## 2019-08-16 DIAGNOSIS — G47.33 OBSTRUCTIVE SLEEP APNEA SYNDROME: Primary | ICD-10-CM
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
## 2019-08-16 NOTE — PATIENT INSTRUCTIONS
You saw saw Dr. Jae Barragan at the [facility] for an evaluation of memory loss, seizures, motor and behavioral changes. Brain MRI showed no signs of neurodegenerative diseaes or inflammation.     Recommendations:    Return to counselor in [facility] to address alcohol intake. It would be best to stop drinking alcohol.     Referral placed for sleep specialist to address your sleep apnea. Better control of this will likely help your memory    Continue current medications. Would be helpful to limit opiate containing medications in the future.     Follow up with Dr. Patiño regarding seizure control.     Follow up with Dr. Ochoa regarding use of Sinemet.    Return to clinic in 6-8 months.    Research opportunities:  1. [facility] Caregiver Registry (forms available in our waiting room)  2. Alzheimer's Association TrialMatch:  http://www.alz.org/research/clinical_trials/find_clinical_trials_trialmatch.asp  3. ClinicalTrials.gov
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
## 2019-09-04 ENCOUNTER — APPOINTMENT (OUTPATIENT)
Dept: CT IMAGING | Facility: CLINIC | Age: 64
DRG: 309 | End: 2019-09-04
Attending: EMERGENCY MEDICINE
Payer: COMMERCIAL

## 2019-09-04 ENCOUNTER — APPOINTMENT (OUTPATIENT)
Dept: MRI IMAGING | Facility: CLINIC | Age: 64
DRG: 309 | End: 2019-09-04
Attending: EMERGENCY MEDICINE
Payer: COMMERCIAL

## 2019-09-04 ENCOUNTER — NURSE TRIAGE (OUTPATIENT)
Dept: NURSING | Facility: CLINIC | Age: 64
End: 2019-09-04

## 2019-09-04 ENCOUNTER — HOSPITAL ENCOUNTER (INPATIENT)
Facility: CLINIC | Age: 64
LOS: 3 days | Discharge: HOME | DRG: 309 | End: 2019-09-08
Attending: EMERGENCY MEDICINE | Admitting: HOSPITALIST
Payer: COMMERCIAL

## 2019-09-04 DIAGNOSIS — F10.20 ALCOHOLISM (H): ICD-10-CM

## 2019-09-04 DIAGNOSIS — R00.1 BRADYCARDIA: ICD-10-CM

## 2019-09-04 DIAGNOSIS — E78.00 HYPERCHOLESTEREMIA: ICD-10-CM

## 2019-09-04 DIAGNOSIS — R41.3 MEMORY LOSS: ICD-10-CM

## 2019-09-04 DIAGNOSIS — R41.82 ALTERED MENTAL STATUS, UNSPECIFIED ALTERED MENTAL STATUS TYPE: ICD-10-CM

## 2019-09-04 DIAGNOSIS — F03.90: ICD-10-CM

## 2019-09-04 DIAGNOSIS — R60.9 EDEMA, UNSPECIFIED TYPE: ICD-10-CM

## 2019-09-04 DIAGNOSIS — R25.1 SHAKING: ICD-10-CM

## 2019-09-04 DIAGNOSIS — G40.309: Primary | ICD-10-CM

## 2019-09-04 DIAGNOSIS — I10 HYPERTENSION, UNSPECIFIED TYPE: ICD-10-CM

## 2019-09-04 DIAGNOSIS — R46.89 ALTERED BEHAVIOR: ICD-10-CM

## 2019-09-04 DIAGNOSIS — E78.5 HYPERLIPIDEMIA, UNSPECIFIED HYPERLIPIDEMIA TYPE: ICD-10-CM

## 2019-09-04 DIAGNOSIS — E43 EDEMA DUE TO MALNUTRITION, DUE TO UNSPECIFIED MALNUTRITION TYPE (H): ICD-10-CM

## 2019-09-04 LAB
ANION GAP SERPL CALCULATED.3IONS-SCNC: 7 MMOL/L (ref 3–14)
APTT PPP: 26 SEC (ref 22–37)
B-HCG FREE SERPL-ACNC: 0.9 [IU]/L (ref 0.86–1.14)
BASOPHILS # BLD AUTO: 0 10E9/L (ref 0–0.2)
BASOPHILS NFR BLD AUTO: 0.4 %
BUN SERPL-MCNC: 17 MG/DL (ref 7–30)
CALCIUM SERPL-MCNC: 9.4 MG/DL (ref 8.5–10.1)
CHLORIDE SERPL-SCNC: 100 MMOL/L (ref 94–109)
CO2 SERPL-SCNC: 31 MMOL/L (ref 20–32)
CREAT BLD-MCNC: 0.7 MG/DL (ref 0.66–1.25)
CREAT SERPL-MCNC: 0.72 MG/DL (ref 0.66–1.25)
DIFFERENTIAL METHOD BLD: ABNORMAL
EOSINOPHIL # BLD AUTO: 0 10E9/L (ref 0–0.7)
EOSINOPHIL NFR BLD AUTO: 0 %
ERYTHROCYTE [DISTWIDTH] IN BLOOD BY AUTOMATED COUNT: 11.7 % (ref 10–15)
ETHANOL SERPL-MCNC: <0.01 G/DL
GFR SERPL CREATININE-BSD FRML MDRD: >90 ML/MIN/{1.73_M2}
GFR SERPL CREATININE-BSD FRML MDRD: >90 ML/MIN/{1.73_M2}
GLUCOSE BLDC GLUCOMTR-MCNC: 138 MG/DL (ref 70–99)
GLUCOSE SERPL-MCNC: 131 MG/DL (ref 70–99)
HCT VFR BLD AUTO: 45.8 % (ref 40–53)
HGB BLD-MCNC: 14.8 G/DL (ref 13.3–17.7)
IMM GRANULOCYTES # BLD: 0.1 10E9/L (ref 0–0.4)
IMM GRANULOCYTES NFR BLD: 0.7 %
INR PPP: 0.91 (ref 0.86–1.14)
INTERPRETATION ECG - MUSE: NORMAL
LYMPHOCYTES # BLD AUTO: 0.8 10E9/L (ref 0.8–5.3)
LYMPHOCYTES NFR BLD AUTO: 7.9 %
MAGNESIUM SERPL-MCNC: 2.2 MG/DL (ref 1.6–2.3)
MCH RBC QN AUTO: 33.5 PG (ref 26.5–33)
MCHC RBC AUTO-ENTMCNC: 32.3 G/DL (ref 31.5–36.5)
MCV RBC AUTO: 104 FL (ref 78–100)
MONOCYTES # BLD AUTO: 0.4 10E9/L (ref 0–1.3)
MONOCYTES NFR BLD AUTO: 4.6 %
NEUTROPHILS # BLD AUTO: 8.4 10E9/L (ref 1.6–8.3)
NEUTROPHILS NFR BLD AUTO: 86.4 %
NRBC # BLD AUTO: 0 10*3/UL
NRBC BLD AUTO-RTO: 0 /100
PHOSPHATE SERPL-MCNC: 3.8 MG/DL (ref 2.5–4.5)
PLATELET # BLD AUTO: 258 10E9/L (ref 150–450)
POTASSIUM SERPL-SCNC: 3.7 MMOL/L (ref 3.4–5.3)
RBC # BLD AUTO: 4.42 10E12/L (ref 4.4–5.9)
SODIUM SERPL-SCNC: 138 MMOL/L (ref 133–144)
TROPONIN I SERPL-MCNC: 0.06 UG/L (ref 0–0.04)
WBC # BLD AUTO: 9.7 10E9/L (ref 4–11)

## 2019-09-04 PROCEDURE — 70498 CT ANGIOGRAPHY NECK: CPT

## 2019-09-04 PROCEDURE — 80320 DRUG SCREEN QUANTALCOHOLS: CPT | Performed by: EMERGENCY MEDICINE

## 2019-09-04 PROCEDURE — 96376 TX/PRO/DX INJ SAME DRUG ADON: CPT | Performed by: EMERGENCY MEDICINE

## 2019-09-04 PROCEDURE — 25000128 H RX IP 250 OP 636: Performed by: RADIOLOGY

## 2019-09-04 PROCEDURE — 96361 HYDRATE IV INFUSION ADD-ON: CPT | Performed by: EMERGENCY MEDICINE

## 2019-09-04 PROCEDURE — 85025 COMPLETE CBC W/AUTO DIFF WBC: CPT | Performed by: EMERGENCY MEDICINE

## 2019-09-04 PROCEDURE — 85610 PROTHROMBIN TIME: CPT | Performed by: EMERGENCY MEDICINE

## 2019-09-04 PROCEDURE — 00000146 ZZHCL STATISTIC GLUCOSE BY METER IP

## 2019-09-04 PROCEDURE — 93005 ELECTROCARDIOGRAM TRACING: CPT | Performed by: EMERGENCY MEDICINE

## 2019-09-04 PROCEDURE — 25800030 ZZH RX IP 258 OP 636: Performed by: EMERGENCY MEDICINE

## 2019-09-04 PROCEDURE — 85730 THROMBOPLASTIN TIME PARTIAL: CPT | Performed by: EMERGENCY MEDICINE

## 2019-09-04 PROCEDURE — 25000132 ZZH RX MED GY IP 250 OP 250 PS 637: Performed by: EMERGENCY MEDICINE

## 2019-09-04 PROCEDURE — 70450 CT HEAD/BRAIN W/O DYE: CPT

## 2019-09-04 PROCEDURE — 99285 EMERGENCY DEPT VISIT HI MDM: CPT | Mod: 25 | Performed by: EMERGENCY MEDICINE

## 2019-09-04 PROCEDURE — 99291 CRITICAL CARE FIRST HOUR: CPT | Mod: 25 | Performed by: EMERGENCY MEDICINE

## 2019-09-04 PROCEDURE — 93010 ELECTROCARDIOGRAM REPORT: CPT | Mod: Z6 | Performed by: EMERGENCY MEDICINE

## 2019-09-04 PROCEDURE — 25000125 ZZHC RX 250: Performed by: EMERGENCY MEDICINE

## 2019-09-04 PROCEDURE — 40000739 ZZH STATISTIC STROKE CODE W/O ACCESS

## 2019-09-04 PROCEDURE — 80048 BASIC METABOLIC PNL TOTAL CA: CPT | Performed by: EMERGENCY MEDICINE

## 2019-09-04 PROCEDURE — 83735 ASSAY OF MAGNESIUM: CPT | Performed by: EMERGENCY MEDICINE

## 2019-09-04 PROCEDURE — 96375 TX/PRO/DX INJ NEW DRUG ADDON: CPT | Performed by: EMERGENCY MEDICINE

## 2019-09-04 PROCEDURE — 84484 ASSAY OF TROPONIN QUANT: CPT | Performed by: EMERGENCY MEDICINE

## 2019-09-04 PROCEDURE — 84100 ASSAY OF PHOSPHORUS: CPT | Performed by: EMERGENCY MEDICINE

## 2019-09-04 PROCEDURE — 82565 ASSAY OF CREATININE: CPT

## 2019-09-04 PROCEDURE — 96366 THER/PROPH/DIAG IV INF ADDON: CPT | Performed by: EMERGENCY MEDICINE

## 2019-09-04 PROCEDURE — 70553 MRI BRAIN STEM W/O & W/DYE: CPT

## 2019-09-04 PROCEDURE — 25000128 H RX IP 250 OP 636: Performed by: EMERGENCY MEDICINE

## 2019-09-04 PROCEDURE — 96365 THER/PROPH/DIAG IV INF INIT: CPT | Performed by: EMERGENCY MEDICINE

## 2019-09-04 PROCEDURE — 85610 PROTHROMBIN TIME: CPT

## 2019-09-04 RX ORDER — HYDROMORPHONE HYDROCHLORIDE 1 MG/ML
0.5 INJECTION, SOLUTION INTRAMUSCULAR; INTRAVENOUS; SUBCUTANEOUS
Status: COMPLETED | OUTPATIENT
Start: 2019-09-04 | End: 2019-09-04

## 2019-09-04 RX ORDER — LORAZEPAM 2 MG/ML
0.5 INJECTION INTRAMUSCULAR ONCE
Status: COMPLETED | OUTPATIENT
Start: 2019-09-04 | End: 2019-09-04

## 2019-09-04 RX ORDER — OXYCODONE HYDROCHLORIDE 5 MG/1
5 TABLET ORAL ONCE
Status: COMPLETED | OUTPATIENT
Start: 2019-09-04 | End: 2019-09-04

## 2019-09-04 RX ORDER — DIAZEPAM 5 MG
10 TABLET ORAL EVERY 30 MIN PRN
Status: DISCONTINUED | OUTPATIENT
Start: 2019-09-04 | End: 2019-09-04 | Stop reason: ALTCHOICE

## 2019-09-04 RX ORDER — IOPAMIDOL 755 MG/ML
75 INJECTION, SOLUTION INTRAVASCULAR ONCE
Status: COMPLETED | OUTPATIENT
Start: 2019-09-04 | End: 2019-09-04

## 2019-09-04 RX ORDER — LORAZEPAM 2 MG/ML
1 INJECTION INTRAMUSCULAR ONCE
Status: COMPLETED | OUTPATIENT
Start: 2019-09-04 | End: 2019-09-04

## 2019-09-04 RX ORDER — LORAZEPAM 2 MG/ML
1-4 INJECTION INTRAMUSCULAR
Status: DISCONTINUED | OUTPATIENT
Start: 2019-09-04 | End: 2019-09-08 | Stop reason: HOSPADM

## 2019-09-04 RX ADMIN — LORAZEPAM 0.5 MG: 2 INJECTION INTRAMUSCULAR; INTRAVENOUS at 22:04

## 2019-09-04 RX ADMIN — IOPAMIDOL 75 ML: 755 INJECTION, SOLUTION INTRAVENOUS at 14:24

## 2019-09-04 RX ADMIN — SODIUM CHLORIDE 500 ML: 0.9 INJECTION, SOLUTION INTRAVENOUS at 14:55

## 2019-09-04 RX ADMIN — Medication 0.5 MG: at 22:47

## 2019-09-04 RX ADMIN — LORAZEPAM 1 MG: 2 INJECTION INTRAMUSCULAR; INTRAVENOUS at 17:24

## 2019-09-04 RX ADMIN — FOLIC ACID: 5 INJECTION, SOLUTION INTRAMUSCULAR; INTRAVENOUS; SUBCUTANEOUS at 17:30

## 2019-09-04 RX ADMIN — LORAZEPAM 1 MG: 2 INJECTION INTRAMUSCULAR; INTRAVENOUS at 16:19

## 2019-09-04 RX ADMIN — OXYCODONE HYDROCHLORIDE 5 MG: 5 TABLET ORAL at 20:51

## 2019-09-04 ASSESSMENT — ENCOUNTER SYMPTOMS
CONFUSION: 1
WEAKNESS: 1
SPEECH DIFFICULTY: 1

## 2019-09-04 ASSESSMENT — VISUAL ACUITY: OU: BASELINE

## 2019-09-04 ASSESSMENT — MIFFLIN-ST. JEOR: SCORE: 1762.41

## 2019-09-04 NOTE — PATIENT INSTRUCTIONS
1. We will start Sinemet (carbidopa/levodopa) 25/100 per the following schedule:    Week 1-1/2 tab three times daily    Week 2-1 tab three times daily    Week 3-1 and a half tablets three times daily    Week 4-2 tabs three times daily    2. We will schedule a video EEG (3 hour) at your convenience    3. We will refer you to Dr. Jae Barragan in the [facility] for evaluation of possible neurodegenerative process ([phone])    4. Return to clinic in one month on Sinemet trial   Acitretin Counseling:  I discussed with the patient the risks of acitretin including but not limited to hair loss, dry lips/skin/eyes, liver damage, hyperlipidemia, depression/suicidal ideation, photosensitivity.  Serious rare side effects can include but are not limited to pancreatitis, pseudotumor cerebri, bony changes, clot formation/stroke/heart attack.  Patient understands that alcohol is contraindicated since it can result in liver toxicity and significantly prolong the elimination of the drug by many years.

## 2019-09-04 NOTE — PHARMACY-CONSULT NOTE
Pharmacy Stroke Code Response  Pharmacist responded as part of the Stroke Code Team activation to patient care area ED 10.  The Stroke Team determined that the patient was not a candidate for IV alteplase therapy and the pharmacy team was dismissed at 1430.

## 2019-09-04 NOTE — CONSULTS
"  [facility], [place]    Stroke Consult Note    Reason for Consult: acute AMS    Chief Complaint: Altered Mental Status      HPI  Jose Loco is a 63 year old male w PHM cognitive decline and behavior change of unclear etiology, cryptogenic late-onset epilepsy, EtOH use history, JULIA+ blood antibodies, who was in his normal state this morning until his Brother in law saw him fall around 6AM,. After that his speech got \"funny\" like he couldn't get any words out. Brought him home around 8AM, and during this time he was awake but tired appearing and speech was still impaired. Went to nap in the recliner until ~11. When he woke up he started having head jerking and making involuntary vocal tics/gasps. Exact time course is unclear but they went on intermittently at least until he was on his way to the hospital. He has had similar events a few times before although they are not his \"typical\" seizures. He can't talk during the spells but then usually can talk again after they are done and this time he still can't speak. Doesn't remember these spells and they have never been captured on EEG. His wife called his doctor's office when he was not getting better and was possibly getting worse and neuro triage nurse said to bring him to the ED.      Of note, his dementia Initially started at least 5-6 years ago and was worked up for FTD, but Has since had extensive workup without a clear etiology. Baseline speaks in full sentences and know names of items, kids, etc but has ataxia and slow speech    TPA Treatment   Not given due to outside the time window, stroke mimic: seizure v encephalopathy.    Endovascular Treatment  Not initiated due to absence of proximal vessel occlusion    Impression  Symptoms unlikely to be caused by stroke    Recommendations  -Consider general neurology consult  -Brain MRI to eval other causes of AMS      Thank you for this consult. No further stroke evaluation is " recommended, so we will sign off. Please contact us with any additional questions.    The patient was discussed with Stroke Fellow, Dr. Phillip.  The Stroke Staff is Dr. Martínez.    Sandhya Dhillon MD  Neurology Resident  Pager:  [phone]  ______________________________________________________    Past Medical History   Past Medical History:   Diagnosis Date     Probably normal dopamine scan (DaTSCAN). mild asymmetry noted 2019 5/24/2019     Seizures (H)      Substance abuse (H)      Past Surgical History   Past Surgical History:   Procedure Laterality Date     Cervical Cord Decompression  2002     GI SURGERY      gastric bypass surgery     HIP SURGERY Left     left hip surgery     KNEE SURGERY Left 2008    left knee arthroplasty     Medications   Home Meds  Prior to Admission medications    Medication Sig Start Date End Date Taking? Authorizing Provider   albuterol (PROAIR HFA/PROVENTIL HFA/VENTOLIN HFA) 108 (90 Base) MCG/ACT inhaler Inhale 1-2 puffs into the lungs every 4 hours as needed 1-2 puffs every 4 hours prn. 1/15/19   Garry Ochoa MD   allopurinol (ZYLOPRIM) 100 MG tablet 100mg tab by mouth 3/day 11/27/18   Reported, Patient   amoxicillin (AMOXIL) 500 MG tablet ONLY FOR DENTAL APPOINTMENT: Reported on 3/21/2017 1/15/19   Garry Ochoa MD   aspirin 81 MG tablet Take by mouth daily    Reported, Patient   baclofen (LIORESAL) 10 MG tablet 10mg tab by mouth twice daily @ 6am and 10pm 3/15/19   Garry Ochoa MD   carbidopa-levodopa (SINEMET)  MG tablet TAKE TWO TABLETS BY MOUTH THREE TIMES A DAY 8/5/19   Garry Ochoa MD   carbidopa-levodopa (SINEMET)  MG tablet 2 tablets by mouth three times daily @ 6am, 12noon, and 11pm 3/15/19   Garry Ochoa MD   cholecalciferol (HM VITAMIN D3) 2000 units CAPS 2 x 2000 unit capsule by mouth daily @ 6am (may switch to evening) 3/15/19   Garry Ochoa MD   clotrimazole (LOTRIMIN) 1 % external cream Apply topically daily as needed  (feet) Reported on 3/21/2017 3/15/19   Gabriela, Garry Marin MD   COMPRESSION STOCKINGS 1 each    Reported, Patient   cyanocobalamin (VITAMIN B12) 1000 MCG/ML injection Inject 1 mL into the muscle every 30 days    Reported, Patient   diphenhydrAMINE (BENADRYL) 2 % external cream Apply topically as needed Reported on 3/21/2017 3/15/19   Gabriela, Garry Marin MD   diphenhydrAMINE-zinc acetate (BENADRYL EXTRA STRENGTH) 2-0.1 % external cream     Reported, Patient   donepezil (ARICEPT) 10 MG tablet Take 1 tablet (10 mg) by mouth At Bedtime 1/25/16   Marco Gipson MD   ferrous fumarate 65 mg, [place]. FE,-Vitamin C 125 mg (VITRON-C)  MG TABS tablet 1 tab by mouth twice daily @ noon and 6pm 3/15/19   Gabriela, Garry Marin MD   fluticasone (FLONASE) 50 MCG/ACT nasal spray As needed 3/15/19   Gabriela, Garry Marin MD   furosemide (LASIX) 40 MG tablet TAKE TWO TABLETS BY MOUTH IN THE MORNING AND TAKE ONE TABLET BY MOUTH IN THE AFTERNOON 3/15/19   Gabriela, Garry Marin MD   gabapentin (NEURONTIN) 800 MG tablet 800mg tab by mouth 3/day @ 6am, 12noon, 11pm 3/15/19   Gabriela, Garry Marin MD   HYDROcodone-acetaminophen (NORCO) 5-325 MG per tablet as needed 1/5/18   Reported, Patient   levETIRAcetam (KEPPRA) 500 MG tablet TAKE 3 PILLS IN THE MORNING, 3 PILLS AT BEDTIME 1/15/19   Gabriela, Garry Marin MD   lidocaine (LIDODERM) 5 % patch Place 1 patch onto the skin daily as needed for moderate pain Reported on 3/21/2017 3/15/19   Gabriela, Garry Marin MD   methadone (DOLOPHINE) 5 MG tablet 1-2 x 5mg tab by mouth nightly 3/15/19   Gabriela, Garry Marin MD   Naftifine (NAFTIN) 2 % GEL Apply topically daily Reported on 3/21/2017    Reported, Patient   NEUPRO 3 MG/24HR PT24 3mg patch apply daily 3/15/19   Gabriela, Garry Marin MD   ondansetron (ZOFRAN) 4 MG tablet Take by mouth as needed for nausea or vomiting Reported on 3/21/2017 1/15/19   Garry Ocoha MD   other medical supplies Cane (device) For home use. X 12 weeks.    Reported,  "Patient   PARoxetine (PAXIL) 40 MG tablet Take 1 tablet (40 mg) by mouth At Bedtime 3/15/19   Gabriela, Garry Marin MD   potassium chloride ER (K-DUR/KLOR-CON M) 20 MEQ CR tablet 20meq tab by mouth daily @ noon 3/15/19   Gabriela, Garry Marin MD   rOPINIRole (REQUIP) 1 MG tablet 1mg tab by mouth 4/day @ 6am, 12noon, 6pm, and 10-11pm 3/15/19   Gabriela, Garry Marin MD   simvastatin (ZOCOR) 40 MG tablet 40mg tab by mouth at bedtime 10/20/18   Reported, Patient   traMADol (ULTRAM) 50 MG tablet Not clear if taking 50mg tab by mouth 3/day 3/15/19   Gabriela, Garry Marin MD   traZODone (DESYREL) 50 MG tablet Not clear if taking trazodone at all. Was listed as 3/day 3/15/19   Gabriela, Garry Marin MD   triamcinolone (KENALOG) 0.5 % cream Apply topically 3 times daily Reported on 3/21/2017    Reported, Patient   UNABLE TO FIND nonformulary  P7 - CROW/CLON/GEORGETTE/IMIP/LIDO/PENT - 10/0.2/6/3/5/3% Apply 1-2 gms to affected area 3-4x per day. Rub in for 1-2 minutes. 1/15/19   Gabriela, Garry Marin MD   UNABLE TO FIND Diabetic shoes DX: Diabetic Neuropathy    Reported, Patient   UNABLE TO FIND Syringe w/ Needle, Disposable - 3 ML (syringe) 22 x 1\" As directed.    Reported, Patient         Allergies   No Known Allergies  Family History   Family History   Problem Relation Age of Onset     Dementia Mother         started in her 70s,  age 83 or 84     Other - See Comments Mother         moderate. lives with spouse     Other - See Comments Father         hearing decline, prostate cancer, memory loss, possible dementia     Prostate Cancer Father      Memory loss Father      Cerebrovascular Disease Father      Hearing Loss Father      Dementia Father      Multiple births Son      Multiple births Son      Paternal side: Grandma and great grandma w strokes at a relatively young age. Grandfather was 52 when he  of brain aneurysm.    Social History   Social History     Tobacco Use     Smoking status: Never Smoker     Smokeless tobacco: Never Used "   Substance Use Topics     Alcohol use: Yes     Drug use: No       Review of Systems   The 10 point Review of Systems is negative other than noted in the HPI        PHYSICAL EXAMINATION  Temp:  [98.2  F (36.8  C)] 98.2  F (36.8  C)  Pulse:  [52-63] 57  Heart Rate:  [53-60] 60  Resp:  [10-22] 22  BP: (152-163)/(83-96) 152/96  SpO2:  [94 %-98 %] 97 %     General:  lying in bed w blank stare    HEENT:  normocephalic/atraumatic  Cardio:  RRR  Pulmonary:  no respiratory distress  Abdomen:  soft, non-tender, obese  Extremities:  no edema  Skin:  intact, warm/dry         Dysphagia Screen  Per Nursing    Stroke Scales    NIHSS  Interval baseline (09/04/19 1418)   Interval Comments     1a. Level of Consciousness 0-->Alert, keenly responsive   1b. LOC Questions 2-->Answers neither question correctly   1c. LOC Commands 2-->Performs neither task correctly   2.   Best Gaze 0-->Normal   3.   Visual 0-->No visual loss   4.   Facial Palsy 0-->Normal symmetrical movements   5a. Motor Arm, Left 0-->No drift, limb holds 90 (or 45) degrees for full 10 secs   5b. Motor Arm, Right 0-->No drift, limb holds 90 (or 45) degrees for full 10 secs   6a. Motor Leg, Left 2-->Some effort against gravity, leg falls to bed by 5 secs, but has some effort against gravity   6b. Motor Leg, right 2-->Some effort against gravity, leg falls to bed by 5 secs, but has some effort against gravity   7.   Limb Ataxia 0-->Absent   8.   Sensory 1-->Mild-to-moderate sensory loss, patient feels pinprick is less sharp or is dull on the affected side, or there is a loss of superficial pain with pinprick, but patient is aware of being touched   9.   Best Language 2-->Severe aphasia, all communication is through fragmentary expression, great need for inference, questioning, and guessing by the listener. Range of information that can be exchanged is limited, listener carries burden of. . . (see row details)   10. Dysarthria 1-->Mild-to-moderate dysarthria, patient slurs  at least some words and, at worst, can be understood with some difficulty   11. Extinction and Inattention  0-->No abnormality   Total 12 (09/04/19 1418)       Imaging  I personally reviewed all imaging; relevant findings per HPI.     Lab Results Data   CBC  Recent Labs   Lab 09/04/19  1403   WBC 9.7   RBC 4.42   HGB 14.8   HCT 45.8        Basic Metabolic Panel    Recent Labs   Lab 09/04/19  1403      POTASSIUM 3.7   CHLORIDE 100   CO2 31   BUN 17   CR 0.72   *   GEMMA 9.4     Liver Panel  No lab results found.  INR  Recent Labs   Lab Test 09/04/19  1403   INR 0.91      Lipid ProfileNo lab results found.  A1CNo lab results found.  Troponin Willy results for input(s): TROPI in the last 168 hours.       Stroke Code / Stroke Consult Data Data   Stroke Code Data  (for stroke code without tele)  Stroke code activated 09/04/19   1407   First stroke provider response 09/04/19   1407   Last known normal 09/04/19   0555   Time of discovery   (or onset of symptoms) 09/04/19   0600   Head CT read by me 09/04/19       Was stroke code de-escalated? Yes 09/04/19 1459  patient is outside emergent treatment time parameters

## 2019-09-04 NOTE — ED PROVIDER NOTES
[place] EMERGENCY DEPARTMENT ([facility])  9/04/19 ED 32    History     Chief Complaint   Patient presents with     Altered Mental Status     The history is provided by the spouse. The history is limited by the condition of the patient (AMS).     Jose Loco is a 63 year old male with a history of cryptogenic generalized epilepsy, parkinsonism, cognitive degenerative changes, elevated JULIA antibody, alcohol use disorder, remote spinal cord injury (2002), hypertension, type II diabetes mellitus, neuropathy, ROSY, status post gastric bypass (2002), and depression; who presents to the Emergency Department for evaluation of altered mental status. Patient is accompanied by his spouse who provides the history. According to her, patient's last known normal was least around 5:00 AM this morning when they woke up. Patient then spent the remainder of the morning with his brother who witnessed a fall the patient had around 6:00 AM where he struck his mouth. Shortly thereafter, patient developed word finding difficulty and confusion--patient is able to state his name but is unable to recall the names of his children, the month or the current president. He continues to be more confused compared to his baseline according to his wife. There is also some question of weakness and wife is unable to state whether or not it was unilateral in nature. His wife was initially concerned that his confusion was possibly related to a seizure as he has a known history of seizures. Per the spouse, his first seizure was about five years ago and he has had multiple seizures since with only one grand-mal two years ago. He is not anticoagulated.     I have reviewed the Medications, Allergies, Past Medical and Surgical History, and Social History in the [email or web address] system.    Past Medical History:   Diagnosis Date     Probably normal dopamine scan (DaTSCAN). mild asymmetry noted 2019 5/24/2019     Seizures (H)      Substance abuse (H)         Past Surgical History:   Procedure Laterality Date     Cervical Cord Decompression       GI SURGERY      gastric bypass surgery     HIP SURGERY Left     left hip surgery     KNEE SURGERY Left 2008    left knee arthroplasty       Family History   Problem Relation Age of Onset     Dementia Mother         started in her 70s,  age 83 or 84     Other - See Comments Mother         moderate. lives with spouse     Other - See Comments Father         hearing decline, prostate cancer, memory loss, possible dementia     Prostate Cancer Father      Memory loss Father      Cerebrovascular Disease Father      Hearing Loss Father      Dementia Father      Multiple births Son      Multiple births Son        Social History     Tobacco Use     Smoking status: Never Smoker     Smokeless tobacco: Never Used   Substance Use Topics     Alcohol use: Yes     Current Facility-Administered Medications   Medication     LORazepam (ATIVAN) injection 1 mg     LORazepam (ATIVAN) injection 1-4 mg     sodium chloride 0.9 % 1,000 mL with INFUVITE ADULT 10 mL, thiamine 100 mg, folic acid 1 mg infusion     Current Outpatient Medications   Medication     albuterol (PROAIR HFA/PROVENTIL HFA/VENTOLIN HFA) 108 (90 Base) MCG/ACT inhaler     allopurinol (ZYLOPRIM) 100 MG tablet     amoxicillin (AMOXIL) 500 MG tablet     aspirin 81 MG tablet     baclofen (LIORESAL) 10 MG tablet     carbidopa-levodopa (SINEMET)  MG tablet     carbidopa-levodopa (SINEMET)  MG tablet     cholecalciferol (HM VITAMIN D3) 2000 units CAPS     clotrimazole (LOTRIMIN) 1 % external cream     COMPRESSION STOCKINGS     cyanocobalamin (VITAMIN B12) 1000 MCG/ML injection     diphenhydrAMINE (BENADRYL) 2 % external cream     diphenhydrAMINE-zinc acetate (BENADRYL EXTRA STRENGTH) 2-0.1 % external cream     donepezil (ARICEPT) 10 MG tablet     ferrous fumarate 65 mg, [place]. FE,-Vitamin C 125 mg (VITRON-C)  MG TABS tablet     fluticasone (FLONASE) 50 MCG/ACT  "nasal spray     furosemide (LASIX) 40 MG tablet     gabapentin (NEURONTIN) 800 MG tablet     HYDROcodone-acetaminophen (NORCO) 5-325 MG per tablet     levETIRAcetam (KEPPRA) 500 MG tablet     lidocaine (LIDODERM) 5 % patch     methadone (DOLOPHINE) 5 MG tablet     Naftifine (NAFTIN) 2 % GEL     NEUPRO 3 MG/24HR PT24     ondansetron (ZOFRAN) 4 MG tablet     other medical supplies     PARoxetine (PAXIL) 40 MG tablet     potassium chloride ER (K-DUR/KLOR-CON M) 20 MEQ CR tablet     rOPINIRole (REQUIP) 1 MG tablet     simvastatin (ZOCOR) 40 MG tablet     traMADol (ULTRAM) 50 MG tablet     traZODone (DESYREL) 50 MG tablet     triamcinolone (KENALOG) 0.5 % cream     UNABLE TO FIND     UNABLE TO FIND     UNABLE TO FIND    No Known Allergies      Review of Systems   Unable to perform ROS: Mental status change   Neurological: Positive for speech difficulty and weakness.   Psychiatric/Behavioral: Positive for confusion.       Physical Exam   BP: (!) 163/83  Pulse: 52  Heart Rate: 53  Temp: 98.2  F (36.8  C)  Resp: 18  Height: 172.7 cm (5' 8\")  Weight: 99.8 kg (220 lb)  SpO2: 94 %      Physical Exam   Constitutional: He appears well-developed and well-nourished. No distress.   HENT:   Head: Normocephalic and atraumatic.   Nose: Nose normal.   Eyes: Conjunctivae are normal. No scleral icterus. Right pupil is round. Left pupil is round. Pupils are equal.   Neck: Normal range of motion. Neck supple. No neck rigidity.   Cardiovascular: Normal rate and normal heart sounds.   No murmur heard.  Pulmonary/Chest: Effort normal and breath sounds normal. No stridor. No respiratory distress. He has no decreased breath sounds. He has no wheezes. He has no rhonchi. He has no rales.   Abdominal: Soft. He exhibits no distension. There is no tenderness.   Musculoskeletal: Normal range of motion. He exhibits no deformity.   Neurological: He is alert. He is disoriented. He exhibits abnormal muscle tone. He displays no seizure activity.   Skin: " Skin is warm and dry. No rash noted. He is not diaphoretic.   Psychiatric: He has a normal mood and affect. Thought content normal. His mood appears not anxious. His affect is not angry. His speech is delayed. He is slowed and withdrawn. He is inattentive.   Nursing note and vitals reviewed.      ED Course        Procedures             EKG Interpretation:      Interpreted by Lorri Hernandez MD  Time reviewed: 1628  Symptoms at time of EKG: Altered mental status  Rhythm: Sinus bradycardia  Rate: 50  Axis: Normal  Ectopy: none  Conduction: Incomplete right bundle branch block  ST Segments/ T Waves: No ST-T wave changes  Q Waves: none  Comparison to prior: Unchanged from 9/4/2019    Clinical Impression: sinus bradycardia, unchanged from previous    Critical Care time:  was 48 minutes for this patient excluding procedures.  The patient has stroke symptoms:           ED Stroke specific documentation           NIHSS PDF          Protocol PDF     Patient last known well time: 5AM  ED Provider first to bedside at: Initial eval by Triage MD who called stroke code  CT Results received at: 1603    tPA:   Not given due to stroke mimic: indeterminate.    If treating with tPA: Ensure SBP<185 and DBP<105 prior to treatment with IV tPA.  Administering IV tPA after treatment with IV labetalol, hydralazine, or nicardipine is reasonable once BP control is established.    Endovascular Retrieval:  Not initiated due to absence of proximal vessel occlusion    National Institutes of Health Stroke Scale (Baseline)  Please see stroke consult note.    Stroke Mimics were considered (including migraine headache, seizure disorder, hypoglycemia (or hyperglycemia), head or spinal trauma, CNS infection, Toxin ingestion and shock state (e.g. sepsis) .         Labs Ordered and Resulted from Time of ED Arrival Up to the Time of Departure from the ED   CBC WITH PLATELETS DIFFERENTIAL - Abnormal; Notable for the following components:       Result Value      (*)     MCH 33.5 (*)     Absolute Neutrophil 8.4 (*)     All other components within normal limits   BASIC METABOLIC PANEL - Abnormal; Notable for the following components:    Glucose 131 (*)     All other components within normal limits   GLUCOSE BY METER - Abnormal; Notable for the following components:    Glucose 138 (*)     All other components within normal limits   TROPONIN I - Abnormal; Notable for the following components:    Troponin I ES 0.055 (*)     All other components within normal limits   GLUCOSE MONITOR NURSING POCT   PARTIAL THROMBOPLASTIN TIME   INR   CREATININE POCT   ALCOHOL ETHYL   MAGNESIUM   PHOSPHORUS   DRUG ABUSE SCREEN 8 URINE (UR)   VITAL SIGNS AND NEURO CHECKS   ACTIVITY   PULSE OXIMETRY NURSING   NOTIFY   ISTAT INR NURSING POCT   ASSESSMENT   CIWA-AR SCALE AND VS   CARDIAC CONTINUOUS MONITORING   NOTIFY PROVIDER   NOTIFY PROVIDER   ISTAT INR POCT            Assessments & Plan (with Medical Decision Making)   Jose Loco is a 63 year old male with a history of cryptogenic generalized epilepsy, parkinsonism, cognitive degenerative changes, elevated JULIA antibody, alcohol use disorder, remote spinal cord injury (2002), hypertension, type II diabetes mellitus, neuropathy, ROSY, status post gastric bypass (2002), and depression; who presents to the Emergency Department for evaluation of altered mental status.    Ddx: seizure, stroke, delirium, toxic-metabolic encephalopathy, ICH, alcohol withdrawal    Stroke code called from triage. Patient alert but confused and aphasic. Diffusely weak on extremity exam but symmetric involving all 4 extremities. Also struggles to follow commands. CT head and CTA head and neck wnl. Neuro recommends obtaining brain MRI with and w/o (seizure protocol). Dispo pending results.     4:08 PM: Patient reevaluated just prior to transfer to MRI. ED RN noted patient HTN to 200s and isauro down to 40s. Mental status unchanged. Wife reports patient c/o  pain but cannot verbalize location. Has been having shaking of his LUE for past hour. Has never had this type of shaking before. Patient does not appear to be able to stop this shaking voluntarily. Alert and responding in one word sentences. Reviewed patient's head CT and wnl. No ICH to explain cushings vitals. C/f focal seizure activity? General neurology consulted to evaluate patient at bedside. Pushed 1 mg IV ativan to see if this had an effect on ?seizure activity.     Neuro evaluated patient who had received IV ativan dose. No longer having shaking activity of LUE. Also with improved HTN and bradycardia. Per Neuro, patient also reportedly drank alcohol yesterday. Could be withdrawal syndrome? Neuro thinks focal seizure activity could explain the above episode. Recommend repeat rx with 2 mg IV lorazepam as needed for recurrent episode.     CIWA protocol initiated. Patient's BP 130s and HR 60s on recheck after lorazepam. Ordered additional 1 mg for anxiolytic prior to MRI. Dispo pending MRI results. Likely general neuro for seizure workup/management vs stroke service if MRI shows new infarct. Patient signed out to oncoming attending who will continue care. Please see addendum for results of work up and remaining management.      I have reviewed the nursing notes.    I have reviewed the findings, diagnosis, plan and need for follow up with the patient.    New Prescriptions    No medications on file       Final diagnoses:   Altered mental status, unspecified altered mental status type     I, Nona Hager, am serving as a trained medical scribe to document services personally performed by Lorri Hernandez MD, based on the provider's statements to me.   ILorri MD, was physically present and have reviewed and verified the accuracy of this note documented by Nona Hager.    9/4/2019   [facility], EMERGENCY DEPARTMENT     Lorri Hernandez MD  09/04/19 [id number]

## 2019-09-04 NOTE — TELEPHONE ENCOUNTER
[facility]: Nurse Triage Note  SITUATION/BACKGROUND                                                      Jose Loco is a 63 year old male who's wife calls after he fell this morning.   Description:  Pt tripped while vacuuming(has a big lip) unsure if he hit his head.      Onset/duration:  This morning   Precip. factors:  Hx seizures  Associated symptoms: not oriented to time, wife states he is slower than normal to respond    MEDICATIONS:   Taking medication(s) as prescribed? Yes  Taking over the counter medication(s?) No  Any barriers to taking medication(s) as prescribed?  No  Medication(s) improving/managing symptoms?  No    Allergies: No Known Allergies    ASSESSMENT      63 year old right-handed male with history of generalized seizure disorder, alcohol abuse, elevated serum GAD65 and acetylcholine receptor antibodies, and diabetes,who's wife calls today with new symptoms after he fell while vacuuming. 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  He was vacuuming and supposedly tripped on the cord.  His lip is fat.  Unsure if he hit his head.  Nils told Regina that he felt light-headed this morning.  Regina is unsure if a seizure was the cause of his fall or the cord. He will have a blank stare with his seizures and she has not noted that this morning  She states he has been slow to respond since.  He is also involuntary gasping every once and a while.  She states he does not appear to be SOB.  He was asked what month and year it was- he was unable to answer.  Regina says he would usually be able to answer. He did know his location and after many attempts was able to acknowledge that he had 3 kids.  He was able to follow commands but it would take awhile and then he would continue to repeat over and over when Regina told him to stop. Regina states he has been taking his prescribed seizure medications as directed.  She  states that he did have some alcohol yesterday.  She does not think he had any today. She asked him if he had any narcotics this morning and he is unable to give her a straight answer.  Denies facial droop, tremors.      Advised Regina to bring Nils to the ER for evaulation  Will update the neurology clinic               RECOMMENDED DISPOSITION:  To ED, another person to drive -   Will comply with recommendation: Yes    If further questions/concerns or if symptoms do not improve, worsen or new symptoms develop, call your PCP or [phone] to talk with the Resident on call, as soon as possible.    Guideline used: pg 530  Telephone Triage Protocols for Nurses, Fifth Edition, Anabelle Adrian, KEENA, RN

## 2019-09-04 NOTE — PROGRESS NOTES
Arrival to Stroke Code: 1410    Stroke Team de-escalate interventions: 1430    ED Nurse providing handoff: Veronika     Time left for CT: 1420    Time Returned to Unit: 1430  Bedside Nurse given handoff to & Time: Veronika 1432    De-escalated, No TPA, out of time window

## 2019-09-04 NOTE — ED TRIAGE NOTES
Pt arrived in triage with concerns of AMS since around 0630 this morning. Pt has hx of dementia, was up around 0500 taking the garbage out and conversing normally with his wife. Pt's brother noticed that he fell around 0600, hit his mouth. Denies that he is on blood thinners. Oriented to place, not time, nor situation.

## 2019-09-05 ENCOUNTER — APPOINTMENT (OUTPATIENT)
Dept: CARDIOLOGY | Facility: CLINIC | Age: 64
DRG: 309 | End: 2019-09-05
Attending: STUDENT IN AN ORGANIZED HEALTH CARE EDUCATION/TRAINING PROGRAM
Payer: COMMERCIAL

## 2019-09-05 ENCOUNTER — APPOINTMENT (OUTPATIENT)
Dept: GENERAL RADIOLOGY | Facility: CLINIC | Age: 64
DRG: 309 | End: 2019-09-05
Attending: EMERGENCY MEDICINE
Payer: COMMERCIAL

## 2019-09-05 ENCOUNTER — ALLIED HEALTH/NURSE VISIT (OUTPATIENT)
Dept: NEUROLOGY | Facility: CLINIC | Age: 64
DRG: 309 | End: 2019-09-05
Attending: PSYCHIATRY & NEUROLOGY
Payer: COMMERCIAL

## 2019-09-05 DIAGNOSIS — G93.40 ENCEPHALOPATHY: Primary | ICD-10-CM

## 2019-09-05 PROBLEM — R46.89 ALTERED BEHAVIOR: Status: ACTIVE | Noted: 2019-09-05

## 2019-09-05 PROBLEM — R41.82 ALTERED MENTAL STATUS: Status: ACTIVE | Noted: 2019-09-05

## 2019-09-05 LAB
ALBUMIN SERPL-MCNC: 3.5 G/DL (ref 3.4–5)
ALBUMIN SERPL-MCNC: 3.6 G/DL (ref 3.4–5)
ALP SERPL-CCNC: 74 U/L (ref 40–150)
ALP SERPL-CCNC: 75 U/L (ref 40–150)
ALT SERPL W P-5'-P-CCNC: 37 U/L (ref 0–70)
ALT SERPL W P-5'-P-CCNC: 38 U/L (ref 0–70)
AMPHETAMINES UR QL SCN: NEGATIVE
ANION GAP SERPL CALCULATED.3IONS-SCNC: 7 MMOL/L (ref 3–14)
AST SERPL W P-5'-P-CCNC: 24 U/L (ref 0–45)
AST SERPL W P-5'-P-CCNC: 27 U/L (ref 0–45)
BARBITURATES UR QL: NEGATIVE
BENZODIAZ UR QL: NEGATIVE
BILIRUB DIRECT SERPL-MCNC: 0.1 MG/DL (ref 0–0.2)
BILIRUB SERPL-MCNC: 0.3 MG/DL (ref 0.2–1.3)
BILIRUB SERPL-MCNC: 0.3 MG/DL (ref 0.2–1.3)
BUN SERPL-MCNC: 14 MG/DL (ref 7–30)
CALCIUM SERPL-MCNC: 8.9 MG/DL (ref 8.5–10.1)
CANNABINOIDS UR QL SCN: NEGATIVE
CHLORIDE SERPL-SCNC: 106 MMOL/L (ref 94–109)
CO2 SERPL-SCNC: 28 MMOL/L (ref 20–32)
COCAINE UR QL: NEGATIVE
CREAT SERPL-MCNC: 0.75 MG/DL (ref 0.66–1.25)
ERYTHROCYTE [DISTWIDTH] IN BLOOD BY AUTOMATED COUNT: 11.9 % (ref 10–15)
ETHANOL UR QL SCN: NEGATIVE
GFR SERPL CREATININE-BSD FRML MDRD: >90 ML/MIN/{1.73_M2}
GLUCOSE BLDC GLUCOMTR-MCNC: 101 MG/DL (ref 70–99)
GLUCOSE BLDC GLUCOMTR-MCNC: 104 MG/DL (ref 70–99)
GLUCOSE BLDC GLUCOMTR-MCNC: 116 MG/DL (ref 70–99)
GLUCOSE BLDC GLUCOMTR-MCNC: 165 MG/DL (ref 70–99)
GLUCOSE SERPL-MCNC: 114 MG/DL (ref 70–99)
HCT VFR BLD AUTO: 41.8 % (ref 40–53)
HGB BLD-MCNC: 13.3 G/DL (ref 13.3–17.7)
INTERPRETATION ECG - MUSE: NORMAL
INTERPRETATION ECG - MUSE: NORMAL
MCH RBC QN AUTO: 33.4 PG (ref 26.5–33)
MCHC RBC AUTO-ENTMCNC: 31.8 G/DL (ref 31.5–36.5)
MCV RBC AUTO: 105 FL (ref 78–100)
OPIATES UR QL SCN: POSITIVE
PCP UR QL SCN: NEGATIVE
PLATELET # BLD AUTO: 238 10E9/L (ref 150–450)
POTASSIUM SERPL-SCNC: 4.1 MMOL/L (ref 3.4–5.3)
PROT SERPL-MCNC: 6.8 G/DL (ref 6.8–8.8)
PROT SERPL-MCNC: 6.9 G/DL (ref 6.8–8.8)
RBC # BLD AUTO: 3.98 10E12/L (ref 4.4–5.9)
SODIUM SERPL-SCNC: 142 MMOL/L (ref 133–144)
TROPONIN I SERPL-MCNC: 0.07 UG/L (ref 0–0.04)
TROPONIN I SERPL-MCNC: 0.08 UG/L (ref 0–0.04)
WBC # BLD AUTO: 7.7 10E9/L (ref 4–11)

## 2019-09-05 PROCEDURE — 85027 COMPLETE CBC AUTOMATED: CPT | Performed by: EMERGENCY MEDICINE

## 2019-09-05 PROCEDURE — 25000132 ZZH RX MED GY IP 250 OP 250 PS 637: Performed by: STUDENT IN AN ORGANIZED HEALTH CARE EDUCATION/TRAINING PROGRAM

## 2019-09-05 PROCEDURE — 80320 DRUG SCREEN QUANTALCOHOLS: CPT | Performed by: EMERGENCY MEDICINE

## 2019-09-05 PROCEDURE — 84484 ASSAY OF TROPONIN QUANT: CPT | Performed by: EMERGENCY MEDICINE

## 2019-09-05 PROCEDURE — 80053 COMPREHEN METABOLIC PANEL: CPT | Performed by: EMERGENCY MEDICINE

## 2019-09-05 PROCEDURE — 99223 1ST HOSP IP/OBS HIGH 75: CPT | Mod: AI | Performed by: HOSPITALIST

## 2019-09-05 PROCEDURE — 12000001 ZZH R&B MED SURG/OB UMMC

## 2019-09-05 PROCEDURE — 25500064 ZZH RX 255 OP 636: Performed by: INTERNAL MEDICINE

## 2019-09-05 PROCEDURE — 80076 HEPATIC FUNCTION PANEL: CPT | Performed by: EMERGENCY MEDICINE

## 2019-09-05 PROCEDURE — 73030 X-RAY EXAM OF SHOULDER: CPT | Mod: RT

## 2019-09-05 PROCEDURE — 25000128 H RX IP 250 OP 636: Performed by: EMERGENCY MEDICINE

## 2019-09-05 PROCEDURE — 00000146 ZZHCL STATISTIC GLUCOSE BY METER IP

## 2019-09-05 PROCEDURE — 25000128 H RX IP 250 OP 636: Performed by: STUDENT IN AN ORGANIZED HEALTH CARE EDUCATION/TRAINING PROGRAM

## 2019-09-05 PROCEDURE — 25000132 ZZH RX MED GY IP 250 OP 250 PS 637: Performed by: EMERGENCY MEDICINE

## 2019-09-05 PROCEDURE — 36415 COLL VENOUS BLD VENIPUNCTURE: CPT | Performed by: EMERGENCY MEDICINE

## 2019-09-05 PROCEDURE — 40000264 ECHOCARDIOGRAM COMPLETE

## 2019-09-05 PROCEDURE — 96376 TX/PRO/DX INJ SAME DRUG ADON: CPT | Performed by: EMERGENCY MEDICINE

## 2019-09-05 PROCEDURE — 25500064 ZZH RX 255 OP 636: Performed by: EMERGENCY MEDICINE

## 2019-09-05 PROCEDURE — 25800025 ZZH RX 258: Performed by: INTERNAL MEDICINE

## 2019-09-05 PROCEDURE — 80307 DRUG TEST PRSMV CHEM ANLYZR: CPT | Performed by: EMERGENCY MEDICINE

## 2019-09-05 PROCEDURE — A9585 GADOBUTROL INJECTION: HCPCS | Performed by: EMERGENCY MEDICINE

## 2019-09-05 PROCEDURE — 95951 ZZHC EEG VIDEO < 12 HR: CPT | Mod: 52,ZF

## 2019-09-05 PROCEDURE — 93306 TTE W/DOPPLER COMPLETE: CPT | Mod: 26 | Performed by: INTERNAL MEDICINE

## 2019-09-05 RX ORDER — GADOBUTROL 604.72 MG/ML
7.5 INJECTION INTRAVENOUS ONCE
Status: COMPLETED | OUTPATIENT
Start: 2019-09-05 | End: 2019-09-05

## 2019-09-05 RX ORDER — PAROXETINE 40 MG/1
40 TABLET, FILM COATED ORAL AT BEDTIME
Status: DISCONTINUED | OUTPATIENT
Start: 2019-09-05 | End: 2019-09-08 | Stop reason: HOSPADM

## 2019-09-05 RX ORDER — ALBUTEROL SULFATE 90 UG/1
1-2 AEROSOL, METERED RESPIRATORY (INHALATION) EVERY 4 HOURS PRN
Status: DISCONTINUED | OUTPATIENT
Start: 2019-09-05 | End: 2019-09-08 | Stop reason: HOSPADM

## 2019-09-05 RX ORDER — ACYCLOVIR 200 MG/1
16 CAPSULE ORAL ONCE
Status: COMPLETED | OUTPATIENT
Start: 2019-09-05 | End: 2019-09-05

## 2019-09-05 RX ORDER — NALOXONE HYDROCHLORIDE 0.4 MG/ML
.1-.4 INJECTION, SOLUTION INTRAMUSCULAR; INTRAVENOUS; SUBCUTANEOUS
Status: DISCONTINUED | OUTPATIENT
Start: 2019-09-05 | End: 2019-09-08 | Stop reason: HOSPADM

## 2019-09-05 RX ORDER — ASPIRIN 81 MG/1
324 TABLET, CHEWABLE ORAL ONCE
Status: COMPLETED | OUTPATIENT
Start: 2019-09-05 | End: 2019-09-05

## 2019-09-05 RX ORDER — HYDROMORPHONE HYDROCHLORIDE 1 MG/ML
0.5 INJECTION, SOLUTION INTRAMUSCULAR; INTRAVENOUS; SUBCUTANEOUS
Status: COMPLETED | OUTPATIENT
Start: 2019-09-05 | End: 2019-09-05

## 2019-09-05 RX ORDER — ONDANSETRON 4 MG/1
4 TABLET, ORALLY DISINTEGRATING ORAL EVERY 6 HOURS PRN
Status: DISCONTINUED | OUTPATIENT
Start: 2019-09-05 | End: 2019-09-08 | Stop reason: HOSPADM

## 2019-09-05 RX ORDER — LEVETIRACETAM 750 MG/1
1500 TABLET ORAL 2 TIMES DAILY
Status: DISCONTINUED | OUTPATIENT
Start: 2019-09-05 | End: 2019-09-08 | Stop reason: HOSPADM

## 2019-09-05 RX ORDER — ACETAMINOPHEN 325 MG/1
975 TABLET ORAL EVERY 8 HOURS PRN
Status: DISCONTINUED | OUTPATIENT
Start: 2019-09-05 | End: 2019-09-08 | Stop reason: HOSPADM

## 2019-09-05 RX ORDER — ONDANSETRON 2 MG/ML
4 INJECTION INTRAMUSCULAR; INTRAVENOUS EVERY 6 HOURS PRN
Status: DISCONTINUED | OUTPATIENT
Start: 2019-09-05 | End: 2019-09-08 | Stop reason: HOSPADM

## 2019-09-05 RX ORDER — BACLOFEN 10 MG/1
10 TABLET ORAL 2 TIMES DAILY
Status: DISCONTINUED | OUTPATIENT
Start: 2019-09-05 | End: 2019-09-08 | Stop reason: HOSPADM

## 2019-09-05 RX ORDER — ROPINIROLE 1 MG/1
1 TABLET, FILM COATED ORAL ONCE
Status: COMPLETED | OUTPATIENT
Start: 2019-09-05 | End: 2019-09-05

## 2019-09-05 RX ORDER — KETOROLAC TROMETHAMINE 30 MG/ML
30 INJECTION, SOLUTION INTRAMUSCULAR; INTRAVENOUS
Status: COMPLETED | OUTPATIENT
Start: 2019-09-05 | End: 2019-09-05

## 2019-09-05 RX ORDER — LANOLIN ALCOHOL/MO/W.PET/CERES
400 CREAM (GRAM) TOPICAL DAILY
Status: DISCONTINUED | OUTPATIENT
Start: 2019-09-05 | End: 2019-09-08 | Stop reason: HOSPADM

## 2019-09-05 RX ORDER — LIDOCAINE 40 MG/G
CREAM TOPICAL
Status: DISCONTINUED | OUTPATIENT
Start: 2019-09-05 | End: 2019-09-08 | Stop reason: HOSPADM

## 2019-09-05 RX ORDER — SIMVASTATIN 40 MG
40 TABLET ORAL AT BEDTIME
Status: DISCONTINUED | OUTPATIENT
Start: 2019-09-05 | End: 2019-09-08 | Stop reason: HOSPADM

## 2019-09-05 RX ORDER — ROPINIROLE 0.25 MG/1
1 TABLET, FILM COATED ORAL 4 TIMES DAILY
Status: DISCONTINUED | OUTPATIENT
Start: 2019-09-05 | End: 2019-09-07

## 2019-09-05 RX ORDER — LANOLIN ALCOHOL/MO/W.PET/CERES
100 CREAM (GRAM) TOPICAL DAILY
Status: DISCONTINUED | OUTPATIENT
Start: 2019-09-05 | End: 2019-09-08 | Stop reason: HOSPADM

## 2019-09-05 RX ORDER — ASPIRIN 81 MG/1
81 TABLET ORAL DAILY
Status: DISCONTINUED | OUTPATIENT
Start: 2019-09-05 | End: 2019-09-08 | Stop reason: HOSPADM

## 2019-09-05 RX ORDER — GABAPENTIN 400 MG/1
800 CAPSULE ORAL 3 TIMES DAILY
Status: DISCONTINUED | OUTPATIENT
Start: 2019-09-05 | End: 2019-09-06

## 2019-09-05 RX ORDER — CARBIDOPA AND LEVODOPA 25; 100 MG/1; MG/1
2 TABLET ORAL 3 TIMES DAILY
Status: DISCONTINUED | OUTPATIENT
Start: 2019-09-05 | End: 2019-09-08 | Stop reason: HOSPADM

## 2019-09-05 RX ORDER — ALLOPURINOL 100 MG/1
100 TABLET ORAL 3 TIMES DAILY
Status: DISCONTINUED | OUTPATIENT
Start: 2019-09-05 | End: 2019-09-08 | Stop reason: HOSPADM

## 2019-09-05 RX ORDER — DONEPEZIL HYDROCHLORIDE 10 MG/1
10 TABLET, FILM COATED ORAL AT BEDTIME
Status: DISCONTINUED | OUTPATIENT
Start: 2019-09-05 | End: 2019-09-06

## 2019-09-05 RX ORDER — METHADONE HYDROCHLORIDE 5 MG/1
5-10 TABLET ORAL EVERY EVENING
Status: DISCONTINUED | OUTPATIENT
Start: 2019-09-05 | End: 2019-09-08 | Stop reason: HOSPADM

## 2019-09-05 RX ORDER — LIDOCAINE 4 G/G
1 PATCH TOPICAL DAILY PRN
Status: DISCONTINUED | OUTPATIENT
Start: 2019-09-05 | End: 2019-09-08 | Stop reason: HOSPADM

## 2019-09-05 RX ADMIN — ALLOPURINOL 100 MG: 100 TABLET ORAL at 07:31

## 2019-09-05 RX ADMIN — ROPINIROLE HYDROCHLORIDE 1 MG: 1 TABLET, FILM COATED ORAL at 04:50

## 2019-09-05 RX ADMIN — ASPIRIN 81 MG CHEWABLE TABLET 324 MG: 81 TABLET CHEWABLE at 03:08

## 2019-09-05 RX ADMIN — CARBIDOPA AND LEVODOPA 2 TABLET: 25; 100 TABLET ORAL at 07:31

## 2019-09-05 RX ADMIN — ROPINIROLE HYDROCHLORIDE 1 MG: 0.25 TABLET, FILM COATED ORAL at 16:35

## 2019-09-05 RX ADMIN — ACETAMINOPHEN 975 MG: 325 TABLET, FILM COATED ORAL at 12:06

## 2019-09-05 RX ADMIN — HUMAN ALBUMIN MICROSPHERES AND PERFLUTREN 5 ML: 10; .22 INJECTION, SOLUTION INTRAVENOUS at 13:15

## 2019-09-05 RX ADMIN — Medication 100 MG: at 13:33

## 2019-09-05 RX ADMIN — ROPINIROLE HYDROCHLORIDE 1 MG: 0.25 TABLET, FILM COATED ORAL at 11:28

## 2019-09-05 RX ADMIN — KETOROLAC TROMETHAMINE 30 MG: 30 INJECTION, SOLUTION INTRAMUSCULAR at 13:51

## 2019-09-05 RX ADMIN — CARBIDOPA AND LEVODOPA 2 TABLET: 25; 100 TABLET ORAL at 13:33

## 2019-09-05 RX ADMIN — MELATONIN 4000 UNITS: at 07:31

## 2019-09-05 RX ADMIN — ALLOPURINOL 100 MG: 100 TABLET ORAL at 20:13

## 2019-09-05 RX ADMIN — ALLOPURINOL 100 MG: 100 TABLET ORAL at 13:33

## 2019-09-05 RX ADMIN — LEVETIRACETAM 1500 MG: 750 TABLET, FILM COATED ORAL at 07:30

## 2019-09-05 RX ADMIN — Medication: at 21:37

## 2019-09-05 RX ADMIN — GADOBUTROL 7.5 ML: 604.72 INJECTION INTRAVENOUS at 00:16

## 2019-09-05 RX ADMIN — PAROXETINE HYDROCHLORIDE 40 MG: 40 TABLET, FILM COATED ORAL at 21:37

## 2019-09-05 RX ADMIN — ROPINIROLE HYDROCHLORIDE 1 MG: 0.25 TABLET, FILM COATED ORAL at 07:30

## 2019-09-05 RX ADMIN — LORAZEPAM 1 MG: 2 INJECTION INTRAMUSCULAR; INTRAVENOUS at 21:37

## 2019-09-05 RX ADMIN — HYDROMORPHONE HYDROCHLORIDE 0.5 MG: 1 INJECTION, SOLUTION INTRAMUSCULAR; INTRAVENOUS; SUBCUTANEOUS at 01:35

## 2019-09-05 RX ADMIN — LIDOCAINE 1 PATCH: 560 PATCH PERCUTANEOUS; TOPICAL; TRANSDERMAL at 19:43

## 2019-09-05 RX ADMIN — SIMVASTATIN 40 MG: 40 TABLET, FILM COATED ORAL at 21:37

## 2019-09-05 RX ADMIN — ROPINIROLE HYDROCHLORIDE 1 MG: 0.25 TABLET, FILM COATED ORAL at 20:12

## 2019-09-05 RX ADMIN — METHADONE HYDROCHLORIDE 5 MG: 5 TABLET ORAL at 20:13

## 2019-09-05 RX ADMIN — PAROXETINE HYDROCHLORIDE 40 MG: 40 TABLET, FILM COATED ORAL at 04:49

## 2019-09-05 RX ADMIN — SIMVASTATIN 40 MG: 40 TABLET, FILM COATED ORAL at 04:49

## 2019-09-05 RX ADMIN — Medication 400 MCG: at 13:33

## 2019-09-05 RX ADMIN — GABAPENTIN 800 MG: 400 CAPSULE ORAL at 20:12

## 2019-09-05 RX ADMIN — ASPIRIN 81 MG: 81 TABLET, DELAYED RELEASE ORAL at 07:31

## 2019-09-05 RX ADMIN — HYDROMORPHONE HYDROCHLORIDE 0.5 MG: 1 INJECTION, SOLUTION INTRAMUSCULAR; INTRAVENOUS; SUBCUTANEOUS at 03:03

## 2019-09-05 RX ADMIN — SODIUM CHLORIDE 12 ML: 9 INJECTION, SOLUTION INTRAMUSCULAR; INTRAVENOUS; SUBCUTANEOUS at 13:31

## 2019-09-05 RX ADMIN — LEVETIRACETAM 1500 MG: 750 TABLET, FILM COATED ORAL at 20:13

## 2019-09-05 RX ADMIN — ACETAMINOPHEN 975 MG: 325 TABLET, FILM COATED ORAL at 20:12

## 2019-09-05 RX ADMIN — DONEPEZIL HYDROCHLORIDE 10 MG: 10 TABLET, FILM COATED ORAL at 21:37

## 2019-09-05 RX ADMIN — ENOXAPARIN SODIUM 40 MG: 40 INJECTION SUBCUTANEOUS at 07:31

## 2019-09-05 RX ADMIN — CARBIDOPA AND LEVODOPA 2 TABLET: 25; 100 TABLET ORAL at 20:12

## 2019-09-05 RX ADMIN — BACLOFEN 10 MG: 10 TABLET ORAL at 09:50

## 2019-09-05 RX ADMIN — BACLOFEN 10 MG: 10 TABLET ORAL at 21:37

## 2019-09-05 ASSESSMENT — ACTIVITIES OF DAILY LIVING (ADL)
DRESS: 2-->ASSISTIVE PERSON
SWALLOWING: 0-->SWALLOWS FOODS/LIQUIDS WITHOUT DIFFICULTY
TOILETING: 0-->INDEPENDENT
RETIRED_COMMUNICATION: 2-->DIFFICULTY UNDERSTANDING AND SPEAKING (NOT RELATED TO LANGUAGE BARRIER)
ADLS_ACUITY_SCORE: 22
ADLS_ACUITY_SCORE: 20
NUMBER_OF_TIMES_PATIENT_HAS_FALLEN_WITHIN_LAST_SIX_MONTHS: 1
ADLS_ACUITY_SCORE: 20
TRANSFERRING: 1-->ASSISTIVE EQUIPMENT
COGNITION: 1 - ATTENTION OR MEMORY DEFICITS
FALL_HISTORY_WITHIN_LAST_SIX_MONTHS: YES
BATHING: 0-->INDEPENDENT
AMBULATION: 1-->ASSISTIVE EQUIPMENT
RETIRED_EATING: 0-->INDEPENDENT
ADLS_ACUITY_SCORE: 18

## 2019-09-05 ASSESSMENT — PAIN DESCRIPTION - DESCRIPTORS: DESCRIPTORS: ACHING

## 2019-09-05 ASSESSMENT — VISUAL ACUITY
OU: BASELINE
OU: BASELINE

## 2019-09-05 NOTE — PLAN OF CARE
"Status: Pt on 6a w AMS, hx of sz. Now on CCM & EEG monitoring.  Vitals: BP (!) 148/76 (BP Location: Left arm)   Pulse 57   Temp 98.4  F (36.9  C) (Oral)   Resp 16   Ht 1.727 m (5' 8\")   Wt 99.8 kg (220 lb)   SpO2 96%   BMI 33.45 kg/m    Neuros: A&Ox4. Tongue extending midline, diminished hearing. BLE 4/5, BUE 5/5. RUE slightly weaker d/t shoulder pain from fall. Baseline LE neuropathy. Confused at times, slight word-finding difficulty. Pt has dementia and parkinsons as well.   IV: PIV SL x2.   Resp/trach: On RA.   Diet: Reg diet, tolerating well.   Bowel status: No BM this shift. BS +.   : Voiding spontanouelsy.   Skin: Bruising on R shoulder, cut on lip and cut/scab on scalp. Rash/sores on abdomen & legs throughout.   Pain: Pt complaining of feet & shoulder pain. MD paged to add pain meds- tylenol given. 1x dose of toradol given.   Activity: Up w.1 /GB/walker. Showered this shift prior to EEG.  Social: Wife at bedside, supportive.   Plan: Continue to monitor and follow POC.    "

## 2019-09-05 NOTE — PROGRESS NOTES
"[facility], [employer]   Neurology Daily Note  Jose Loco  [id number]  09/05/2019    Subjective:  Pt was transferred to general neurology from the medicine service. No overnight events. Pt says he is doing well, and wife believes he is doing better. Reports pain in right shoulder, not improving on tylenol.     Objective   Physical Examination   Vitals: BP (!) 145/81 (BP Location: Left arm)   Pulse (!) 42   Temp 98.7  F (37.1  C) (Oral)   Resp 16   Ht 1.727 m (5' 8\")   Wt 99.8 kg (220 lb)   SpO2 96%   BMI 33.45 kg/m    General: Adult male patient, lying in bed, NAD  HEENT: NC/AT, no icterus, op pink and moist  Chest: non-labored on RA  Extremities: Warm, LE edema  Skin: mild dermatitis changes on both shins   Psych: Mood pleasant, affect congruent  Neuro:  Mental status: Awake, alert, attentive, oriented to self, time, place, and circumstance. Language is slowed, slow comprehension of complex commands, normal naming, difficulty with repeating sentences.  Cranial nerves: VFF, PERRL, conjugate gaze, EOMI, facial sensation intact, face symmetric, hearing intact to conversation, shoulder shrug strong, tongue/uvula midline  Motor: Normal bulk and tone. No abnormal movements. No tremor observed. No pronator drift. 5/5 strength in 4/4 extremities.   Reflexes: deferred  Sensory: Decreased sensation to pinprick up to mid-calf in left leg. Intact sensation in RLE and bilateral upper extremities.   Coordination: FNF without ataxia or dysmetria. Difficulty with HKS on the right side, normal on the left.   Gait: Deferred    Investigations    MRI - 9/4/19  1. No acute intracranial pathology. No restricted diffusion to suggest cerebral infarction.  2. No abnormal enhancement.     CT/CTA head and neck - 9/4/19  1. Head CTA demonstrates no aneurysm or stenosis of the major intracranial arteries.  2. Neck CTA demonstrates no stenosis of the major cervical arteries.  3. No intracranial " hemorrhage on the noncontrast head CT.    Labs - 19   -Glucose - 116  -Troponin I - .067  -CMP wnl except glucose elevated  -CBC w/ platelets wnl except  RBC count, increased MCV and MCH  -Hepatic panel wnl   - Drug abuse screen positive for opiates   - Vitamin B12 and folate pending    XR Right Shoulder - 19  No evidence of fracture or dislocation on the one axillary view was not obtained.. Mild irregularity of the humeral head.    Assessment and Plan   Jose Loco is a 63 year old male with a history of generalized seizure disorder, alcohol abuse, parkinsonism, hypertension, T2DM (diet-control) with diabetic neuropathy, HLD, ROSY, and depression admitted on 2019 with confusion after passing out and hitting his head. He is followed by multiple providers in the community, and has had poor f/u in the past. Pt has had large number of workups for extensive hx of possible autoimmune condition, frontotemporal dementia, and parkinsonisms.    #Loss of consciousness/AMS/fall with mild head trauma: Presented with LOC, resulting in a fall with evidence of mild head trauma. Subsequently, developed AMS. Per wife, associated with sweating. Denies cardiac symptoms prior to the episode. Neurological examination was non-focal and work-up including CT/CTA and MRI were negative for acute intracranial pathology. No metabolic abnormalities noted. Given his history of seizure disorder, possible that his episode of LOC with subsequent confusion could be a seizure with postictal state. May also consider syncopal episode/cardiac etiology in the setting of co-morbidities. May be toxic in the setting of ETOH use disorder and opioid use for pain management. Lastly, confusion may be related to his underlying cognitive impairment/suspected dementia (see below).   - Seizure management as below  - Cardiac monitoring  - Echocardiogram    #Hx of generalized seizure disorder  EEG in 2018 with findings indicative of a  generalized seizure tendency, supporting a diagnosis of generalized epilepsy; seizures were not recorded.   - Continue Levetiracetam 1500 mg BID  - Continuous EEG to monitor for seizure activity   - Seizure precautions    #Alcohol use disorder: Pt has had success in the past with outpatient treatment - had been sober for a while until recently. Last drink on Tuesday, 9/3.  Pt has not had alcohol withdrawal symptoms but will continue to monitor.   - On CIWA protocol, with Ativan ordered  - Thiamine and folate supplementation  - Chemical Dependency IP consult    #Right shoulder pain: XR unremarkable. Pain not stabilized with tylenol so ordered ketorolac for pain management.     #Mild cognitive impairment: Likely multifactorial including persistent alcohol use (6 drinks/day) and use of opiate pain meds. He has been following with neurology for dementia work up.   - Continue Donepezil 10 mg daily     #Concern for Parkinsonism: Seen by Dr. Ochoa in clinic earlier this year. He had a ALEXANDRA scan on 5/25/19 which was unremarkable.  - Continue Sinemet    #RLS  - Continue Ropinirole    #T2DM: HgbA1C 6.1 (07/2019). Not on any meds at home. Stable at this time.   - Monitor BG    #HTN: Not on antihypertensives. Noted to have elevated blood pressure. May be due to ETOH withdrawal, though he likely has high BP at baseline that is not treated.   - Monitor BP for now   - Will discharge with plan for follow up with PCP    #HLD:   - Continue Simvastatin 40 mg daily     #ROSY: Not on CPAP.    #Depression  - Continue paroxetine 40 mg daily       FEN: combination adult diet  PPx: Enoxaparin   Code: Full code    Dispo: 2 to 3 days    [facility]   Medical Student- MS3    Resident/Fellow Attestation   I, Sera Becker, was present with the medical student who participated in the service and in the documentation of the note.  I have verified the history and personally performed the  physical exam and medical decision making.  I agree with the assessment and plan of care as documented in the note.      Sera Becker MD  PGY2  Date of Service (when I saw the patient): 09/05/19    PATIENT SEEN AND EXAMINED BY ME on September 5, 2019 I AGREE WITH THE FINDINGS DETAILED BY THE NEUROLOGY RESIDENT and documented in their note on September 5, 2019   PLEASE REFER TO THEIR NOTE FOR ADDITIONAL DETAILS.       POPPY KIM MD  September 5, 2019

## 2019-09-05 NOTE — H&P
[facility], [place]    History and Physical - [facility] Night Service        Date of Admission:  9/4/2019    Assessment & Plan   Jose Loco is a 63 year old male admitted on 9/4/2019. He has a history of generalized seizure disorder, alcohol abuse, parkinsonism, hypertension, T2DM (diet-control) with diabetic neuropathy, HLD, ROSY, and depression, presented with confusion for 1 day.    AMS  History of generalized seizure disorder  Fall with mild head trauma  Presented with unwitnessed episode of fall with evidence of mild head trauma. Has no cardiac symptoms prior to the episode but has AMS, confusion after that. Neurological examination was non-focal and work-up including CT/CTA and MRI were negative for acute intracranial pathology and was evaluated by stroke team at ED. Given his history of seizure disorder, this is most likely another seizure episode with postictal phase. Patient's mental status has improved at the time of evaluation.  - General neurology consult (ordered)  - Observe mental status  - Continue Levetiracetam 1500 mg BID  - Hold-off sedative medications (Gabapentin, Norco, Tramadol, Trazodone)    Alcohol abuse  Mild cognitive impairment  Parkinsonism  Still actively drinking alcohol. However, alcohol withdrawal seizure is unlikely given the time-frame between his last drink and the event. Also he has no sign of sympathetic overactivity. The most likely cause of his persistent cogntive changes is multifactorial including persistent alcohol use (6 drinks/day), untreated obstructive sleep apnea, seizure disorder with ongoing staring spells, and use of opiate pain meds. There are few signs of Parkinsonism on today's exam.  - On CIWA protocol  - Continue Sinemet, Ropinirole  - Continue Donepezil    T2DM  HTN  HLD  ROSY  Depression  BS on admission 131. /96. Currently euthymic, has no active psychiatric issue. ROSY currently not on CPAP.  - BS AC&HS  -  Continue Simvastatin  - Continue Paroxetine     Diet: NPO for Medical/Clinical Reasons Except for: No Exceptions    Fluids: None  DVT Prophylaxis: Enoxaparin (Lovenox) SQ  Palmer Catheter: not present  Code Status: Full code    Disposition Plan   Expected discharge: 2 - 3 days, recommended to prior living arrangement once mental status at baseline.  Entered: Tory Reynolds MD 09/05/2019, 2:34 AM     The patient's care will be discussed with the Attending Physician, Dr. Liz Braun in AM.    Tory Reynolds MD  [facility], [place]  Pager: [phone]  Please see sticky note for cross cover information  ______________________________________________________________________    Chief Complaint   Confusion for 1 day    History is obtained from the patient's and his spouse.    History of Present Illness   Jose Loco is a 63 year old male who has a history of generalized seizure disorder, alcohol abuse, parkinsonism, hypertension, T2DM (diet-control) with diabetic neuropathy, HLD, ROSY, and depression, presented with confusion for 1 day.    He reports that he slipped and fell today at around 6 am. He feels nauseated and dizzy before that but denies chest pain or palpitation. The episode was unwitnessed but he states that he hit his head and blacked-out for around 20 minutes before his wife's brother found him. After that, patient developed word finding difficulty, somnolence, and confusion. According to his wife, he continues to be more confused compared to his baseline, patient's last known normal was around 5 AM this morning when they woke up. Denies weakness or numbness in his arms or legs. He has been drinking alcohol about 6 drinks/day. He also takes pain meds, including Norco daily. He denies smoking or recreational drug use.    Regarding seizures, he has been following by Neurology (last visit was 8/16/19). He has been mostly compliant with  the levetiracetam 1500 bid. He has had more staring episodes 3-4 times a week. The last noticeable grand mal was around two years ago. He has had no change in motor function although he has increase tremor in his both hands.  Last EEG (18); Abnormal.  Generalized spikes and rare brief spike-wave bursts are noted. These findings indicate a generalized seizure tendency and would support a diagnosis of generalized epilepsy. Seizures were not recorded. Background and posterior dominant rhythm were within normal limits.    He was evaluated in the ED by neurology stroke team. Work-up including CMP, head CT and MRI were negative.    Review of Systems    The 10 point Review of Systems is negative other than noted in the HPI.    Past Medical History    I have reviewed this patient's medical history and updated it with pertinent information if needed.   Past Medical History:   Diagnosis Date     Probably normal dopamine scan (DaTSCAN). mild asymmetry noted 2019     Seizures (H)      Substance abuse (H)         Past Surgical History   I have reviewed this patient's surgical history and updated it with pertinent information if needed.  Past Surgical History:   Procedure Laterality Date     Cervical Cord Decompression       GI SURGERY      gastric bypass surgery     HIP SURGERY Left     left hip surgery     KNEE SURGERY Left 2008    left knee arthroplasty        Social History   I have reviewed this patient's social history and updated it with pertinent information if needed. Jose Ever Loco  reports that he has never smoked. He has never used smokeless tobacco. He reports that he drinks alcohol. He reports that he does not use drugs.    Family History   I have reviewed this patient's family history and updated it with pertinent information if needed.   Family History   Problem Relation Age of Onset     Dementia Mother         started in her 70s,  age 83 or 84     Other - See Comments Mother          "moderate. lives with spouse     Other - See Comments Father         hearing decline, prostate cancer, memory loss, possible dementia     Prostate Cancer Father      Memory loss Father      Cerebrovascular Disease Father      Hearing Loss Father      Dementia Father      Multiple births Son      Multiple births Son        Prior to Admission Medications   Prior to Admission Medications   Prescriptions Last Dose Informant Patient Reported? Taking?   COMPRESSION STOCKINGS   Yes No   Si each   HYDROcodone-acetaminophen (NORCO) 5-325 MG per tablet   Yes No   Sig: as needed   NEUPRO 3 MG/24HR PT24   Yes No   Sig: 3mg patch apply daily   Naftifine (NAFTIN) 2 % GEL   Yes No   Sig: Apply topically daily Reported on 3/21/2017   PARoxetine (PAXIL) 40 MG tablet   Yes No   Sig: Take 1 tablet (40 mg) by mouth At Bedtime   UNABLE TO FIND   Yes No   Sig: Diabetic shoes DX: Diabetic Neuropathy   UNABLE TO FIND   Yes No   Sig: Syringe w/ Needle, Disposable - 3 ML (syringe) 22 x 1\" As directed.   UNABLE TO FIND   Yes No   Sig: nonformulary  P7 - CROW/CLON/GEORGETTE/IMIP/LIDO/PENT - 10/0.2/6/3/5/3% Apply 1-2 gms to affected area 3-4x per day. Rub in for 1-2 minutes.   albuterol (PROAIR HFA/PROVENTIL HFA/VENTOLIN HFA) 108 (90 Base) MCG/ACT inhaler   Yes No   Sig: Inhale 1-2 puffs into the lungs every 4 hours as needed 1-2 puffs every 4 hours prn.   allopurinol (ZYLOPRIM) 100 MG tablet   Yes No   Simg tab by mouth 3/day   amoxicillin (AMOXIL) 500 MG tablet   Yes No   Sig: ONLY FOR DENTAL APPOINTMENT: Reported on 3/21/2017   aspirin 81 MG tablet   Yes No   Sig: Take by mouth daily   baclofen (LIORESAL) 10 MG tablet   Yes No   Sig: 10mg tab by mouth twice daily @ 6am and 10pm   carbidopa-levodopa (SINEMET)  MG tablet   Yes No   Si tablets by mouth three times daily @ 6am, 12noon, and 11pm   carbidopa-levodopa (SINEMET)  MG tablet   No No   Sig: TAKE TWO TABLETS BY MOUTH THREE TIMES A DAY   cholecalciferol (HM VITAMIN D3) " 2000 units CAPS   Yes No   Sig: 2 x 2000 unit capsule by mouth daily @ 6am (may switch to evening)   clotrimazole (LOTRIMIN) 1 % external cream   Yes No   Sig: Apply topically daily as needed (feet) Reported on 3/21/2017   cyanocobalamin (VITAMIN B12) 1000 MCG/ML injection   Yes No   Sig: Inject 1 mL into the muscle every 30 days   diphenhydrAMINE (BENADRYL) 2 % external cream   Yes No   Sig: Apply topically as needed Reported on 3/21/2017   diphenhydrAMINE-zinc acetate (BENADRYL EXTRA STRENGTH) 2-0.1 % external cream   Yes No   donepezil (ARICEPT) 10 MG tablet   No No   Sig: Take 1 tablet (10 mg) by mouth At Bedtime   ferrous fumarate 65 mg, [place]. FE,-Vitamin C 125 mg (VITRON-C)  MG TABS tablet   Yes No   Si tab by mouth twice daily @ noon and 6pm   fluticasone (FLONASE) 50 MCG/ACT nasal spray   Yes No   Sig: As needed   furosemide (LASIX) 40 MG tablet   Yes No   Sig: TAKE TWO TABLETS BY MOUTH IN THE MORNING AND TAKE ONE TABLET BY MOUTH IN THE AFTERNOON   gabapentin (NEURONTIN) 800 MG tablet   Yes No   Simg tab by mouth 3/day @ 6am, 12noon, 11pm   levETIRAcetam (KEPPRA) 500 MG tablet   Yes No   Sig: TAKE 3 PILLS IN THE MORNING, 3 PILLS AT BEDTIME   lidocaine (LIDODERM) 5 % patch   Yes No   Sig: Place 1 patch onto the skin daily as needed for moderate pain Reported on 3/21/2017   methadone (DOLOPHINE) 5 MG tablet   Yes No   Si-2 x 5mg tab by mouth nightly   ondansetron (ZOFRAN) 4 MG tablet   Yes No   Sig: Take by mouth as needed for nausea or vomiting Reported on 3/21/2017   other medical supplies   Yes No   Sig: Cane (device) For home use. X 12 weeks.   potassium chloride ER (K-DUR/KLOR-CON M) 20 MEQ CR tablet   Yes No   Simeq tab by mouth daily @ noon   rOPINIRole (REQUIP) 1 MG tablet   Yes No   Simg tab by mouth 4/day @ 6am, 12noon, 6pm, and 10-11pm   simvastatin (ZOCOR) 40 MG tablet   Yes No   Simg tab by mouth at bedtime   traMADol (ULTRAM) 50 MG tablet   Yes No   Sig: Not  clear if taking 50mg tab by mouth 3/day   traZODone (DESYREL) 50 MG tablet   Yes No   Sig: Not clear if taking trazodone at all. Was listed as 3/day   triamcinolone (KENALOG) 0.5 % cream   Yes No   Sig: Apply topically 3 times daily Reported on 3/21/2017      Facility-Administered Medications: None     Allergies   No Known Allergies    Physical Exam   Vital Signs: Temp: 98.2  F (36.8  C) Temp src: Oral BP: (!) 148/75 Pulse: 58 Heart Rate: (!) 47 Resp: 13 SpO2: 100 % O2 Device: None (Room air)    Weight: 220 lbs 0 oz    General Appearance: sitting-up in bed. In no acute distress.  Eyes: EOMI/PERRLA. Anicteric sclera.  HEENT: small scalp contusion on left hemisphere. neck is supple with full ROM.  Respiratory: Lungs are clear to auscultation bilaterally.  Cardiovascular: RRR, no rubs/murmurs/or gallops.  GI: soft abdomen. NT/ND. +BS.  Skin: mild dermatitis changes on both shins.  Musculoskeletal: no lower extremity edema.  Neurologic: alert&oriented. Intact CN 2-12. Motor power grade 5/5 throughout. DTR 2+ throughout. Mild decreased sensation on distal LLE (unchange from his baseline).  Psychiatric: euthymic. Answers questions appropriately.    Data   Data reviewed today: I reviewed all medications, new labs and imaging results over the last 24 hours. I personally reviewed;    MRI brain (9/4/19);  1. No acute intracranial pathology. No restricted diffusion to suggest cerebral infarction.  2. No abnormal enhancement.     CT/CTA head and neck (9/4/19);  1. Head CTA demonstrates no aneurysm or stenosis of the major intracranial arteries.  2. Neck CTA demonstrates no stenosis of the major cervical arteries.  3. No intracranial hemorrhage on the noncontrast head CT.    Recent Labs   Lab 09/05/19  0116 09/04/19  1403   WBC  --  9.7   HGB  --  14.8   MCV  --  104*   PLT  --  258   INR  --  0.91   NA  --  138   POTASSIUM  --  3.7   CHLORIDE  --  100   CO2  --  31   BUN  --  17   CR  --  0.72   ANIONGAP  --  7   GEMMA  --  9.4    GLC  --  131*   ALBUMIN 3.5  --    PROTTOTAL 6.8  --    BILITOTAL 0.3  --    ALKPHOS 74  --    ALT 38  --    AST 24  --    TROPI 0.083* 0.055*   UDS positive for opiates

## 2019-09-05 NOTE — PLAN OF CARE
Pt. transferred from ED around 0630 for further evaluation of AMS. Settled in room and brief assessment completed. VSS on RA ex/ HTN within parameters and intermittent bradycardia (50-59bpm). A&Ox4. Neuros include: confused, word-finding difficulty, slow to respond at times, R. lip swelling, slight L. tongue deviation, diminished hearing, BUE 5/5 with RUE slightly weaker d/t R. shoulder pain, BLE 4/5, baseline neuropathy to BLE. On CCM. Tolerating regular diet. Voids spontaneously without difficulty. No BM this shift. Up A1/GB, walker ordered as pt. uses one at home for long distances. Bilateral PIV, SL. Continue to monitor and follow POC.

## 2019-09-05 NOTE — CONSULTS
9/5/2019    CD consult acknowledged. Writer attempted to reach out to SW regarding pt. Based off of pt's chart review/records, CD team doesn't know of any treatment centers that would accept pt.   Pt may qualify for day treatment at: [email or web address]    If there are questions about this, please contact Lori at [phone] or Zara at [phone].    MANUEL Estevez

## 2019-09-05 NOTE — PROGRESS NOTES
PATIENT SEEN AND EXAMINED BY ME on September 5, 2019 I AGREE WITH THE FINDINGS DETAILED BY THE NEUROLOGY RESIDENT and documented in their note on September 5, 2019   PLEASE REFER TO THEIR NOTE FOR ADDITIONAL DETAILS.       POPPY KIM MD  September 5, 2019

## 2019-09-06 ENCOUNTER — APPOINTMENT (OUTPATIENT)
Dept: OCCUPATIONAL THERAPY | Facility: CLINIC | Age: 64
DRG: 309 | End: 2019-09-06
Attending: STUDENT IN AN ORGANIZED HEALTH CARE EDUCATION/TRAINING PROGRAM
Payer: COMMERCIAL

## 2019-09-06 ENCOUNTER — ALLIED HEALTH/NURSE VISIT (OUTPATIENT)
Dept: NEUROLOGY | Facility: CLINIC | Age: 64
DRG: 309 | End: 2019-09-06
Attending: PSYCHIATRY & NEUROLOGY
Payer: COMMERCIAL

## 2019-09-06 DIAGNOSIS — R41.3 MEMORY LOSS: ICD-10-CM

## 2019-09-06 DIAGNOSIS — G93.40 ENCEPHALOPATHY: Primary | ICD-10-CM

## 2019-09-06 LAB
FOLATE SERPL-MCNC: 30 NG/ML
GLUCOSE BLDC GLUCOMTR-MCNC: 220 MG/DL (ref 70–99)
GLUCOSE BLDC GLUCOMTR-MCNC: 251 MG/DL (ref 70–99)
GLUCOSE BLDC GLUCOMTR-MCNC: 97 MG/DL (ref 70–99)
GLUCOSE BLDC GLUCOMTR-MCNC: 99 MG/DL (ref 70–99)
VIT B12 SERPL-MCNC: 429 PG/ML (ref 193–986)

## 2019-09-06 PROCEDURE — 95951 ZZHC EEG VIDEO EACH 24 HR: CPT | Mod: ZF

## 2019-09-06 PROCEDURE — 82607 VITAMIN B-12: CPT | Performed by: PSYCHIATRY & NEUROLOGY

## 2019-09-06 PROCEDURE — 36415 COLL VENOUS BLD VENIPUNCTURE: CPT | Performed by: PSYCHIATRY & NEUROLOGY

## 2019-09-06 PROCEDURE — 97165 OT EVAL LOW COMPLEX 30 MIN: CPT | Mod: GO

## 2019-09-06 PROCEDURE — 96125 COGNITIVE TEST BY HC PRO: CPT | Mod: GO

## 2019-09-06 PROCEDURE — 25000132 ZZH RX MED GY IP 250 OP 250 PS 637: Performed by: STUDENT IN AN ORGANIZED HEALTH CARE EDUCATION/TRAINING PROGRAM

## 2019-09-06 PROCEDURE — 82746 ASSAY OF FOLIC ACID SERUM: CPT | Performed by: PSYCHIATRY & NEUROLOGY

## 2019-09-06 PROCEDURE — 12000001 ZZH R&B MED SURG/OB UMMC

## 2019-09-06 PROCEDURE — 97530 THERAPEUTIC ACTIVITIES: CPT | Mod: GO

## 2019-09-06 PROCEDURE — 00000146 ZZHCL STATISTIC GLUCOSE BY METER IP

## 2019-09-06 PROCEDURE — 83921 ORGANIC ACID SINGLE QUANT: CPT | Performed by: PSYCHIATRY & NEUROLOGY

## 2019-09-06 PROCEDURE — 97535 SELF CARE MNGMENT TRAINING: CPT | Mod: GO

## 2019-09-06 PROCEDURE — 25000131 ZZH RX MED GY IP 250 OP 636 PS 637: Performed by: STUDENT IN AN ORGANIZED HEALTH CARE EDUCATION/TRAINING PROGRAM

## 2019-09-06 PROCEDURE — 25000128 H RX IP 250 OP 636: Performed by: STUDENT IN AN ORGANIZED HEALTH CARE EDUCATION/TRAINING PROGRAM

## 2019-09-06 RX ORDER — GABAPENTIN 800 MG/1
800 TABLET ORAL 3 TIMES DAILY
Status: DISCONTINUED | OUTPATIENT
Start: 2019-09-06 | End: 2019-09-08 | Stop reason: HOSPADM

## 2019-09-06 RX ORDER — POTASSIUM CHLORIDE 750 MG/1
20-40 TABLET, EXTENDED RELEASE ORAL
Status: DISCONTINUED | OUTPATIENT
Start: 2019-09-06 | End: 2019-09-08 | Stop reason: HOSPADM

## 2019-09-06 RX ORDER — NICOTINE POLACRILEX 4 MG
15-30 LOZENGE BUCCAL
Status: DISCONTINUED | OUTPATIENT
Start: 2019-09-06 | End: 2019-09-08 | Stop reason: HOSPADM

## 2019-09-06 RX ORDER — POTASSIUM CL/LIDO/0.9 % NACL 10MEQ/0.1L
10 INTRAVENOUS SOLUTION, PIGGYBACK (ML) INTRAVENOUS
Status: DISCONTINUED | OUTPATIENT
Start: 2019-09-06 | End: 2019-09-08 | Stop reason: HOSPADM

## 2019-09-06 RX ORDER — AMOXICILLIN 250 MG
1-2 CAPSULE ORAL AT BEDTIME
Status: DISCONTINUED | OUTPATIENT
Start: 2019-09-06 | End: 2019-09-08 | Stop reason: HOSPADM

## 2019-09-06 RX ORDER — POTASSIUM CHLORIDE 29.8 MG/ML
20 INJECTION INTRAVENOUS
Status: DISCONTINUED | OUTPATIENT
Start: 2019-09-06 | End: 2019-09-08 | Stop reason: HOSPADM

## 2019-09-06 RX ORDER — POTASSIUM CHLORIDE 1.5 G/1.58G
20-40 POWDER, FOR SOLUTION ORAL
Status: DISCONTINUED | OUTPATIENT
Start: 2019-09-06 | End: 2019-09-08 | Stop reason: HOSPADM

## 2019-09-06 RX ORDER — DONEPEZIL HYDROCHLORIDE 5 MG/1
5 TABLET, FILM COATED ORAL AT BEDTIME
Status: DISCONTINUED | OUTPATIENT
Start: 2019-09-06 | End: 2019-09-08 | Stop reason: HOSPADM

## 2019-09-06 RX ORDER — DEXTROSE MONOHYDRATE 25 G/50ML
25-50 INJECTION, SOLUTION INTRAVENOUS
Status: DISCONTINUED | OUTPATIENT
Start: 2019-09-06 | End: 2019-09-08 | Stop reason: HOSPADM

## 2019-09-06 RX ORDER — POTASSIUM CHLORIDE 7.45 MG/ML
10 INJECTION INTRAVENOUS
Status: DISCONTINUED | OUTPATIENT
Start: 2019-09-06 | End: 2019-09-08 | Stop reason: HOSPADM

## 2019-09-06 RX ORDER — MAGNESIUM SULFATE HEPTAHYDRATE 40 MG/ML
4 INJECTION, SOLUTION INTRAVENOUS EVERY 4 HOURS PRN
Status: DISCONTINUED | OUTPATIENT
Start: 2019-09-06 | End: 2019-09-08 | Stop reason: HOSPADM

## 2019-09-06 RX ADMIN — Medication 400 MCG: at 07:55

## 2019-09-06 RX ADMIN — ROPINIROLE HYDROCHLORIDE 1 MG: 0.25 TABLET, FILM COATED ORAL at 07:13

## 2019-09-06 RX ADMIN — ASPIRIN 81 MG: 81 TABLET, DELAYED RELEASE ORAL at 07:55

## 2019-09-06 RX ADMIN — Medication 100 MG: at 07:55

## 2019-09-06 RX ADMIN — METHADONE HYDROCHLORIDE 5 MG: 5 TABLET ORAL at 19:15

## 2019-09-06 RX ADMIN — ROPINIROLE HYDROCHLORIDE 1 MG: 0.25 TABLET, FILM COATED ORAL at 16:30

## 2019-09-06 RX ADMIN — SENNOSIDES AND DOCUSATE SODIUM 2 TABLET: 8.6; 5 TABLET ORAL at 21:38

## 2019-09-06 RX ADMIN — GABAPENTIN 800 MG: 800 TABLET, FILM COATED ORAL at 21:38

## 2019-09-06 RX ADMIN — ENOXAPARIN SODIUM 40 MG: 40 INJECTION SUBCUTANEOUS at 07:55

## 2019-09-06 RX ADMIN — LEVETIRACETAM 1500 MG: 750 TABLET, FILM COATED ORAL at 19:15

## 2019-09-06 RX ADMIN — LEVETIRACETAM 1500 MG: 750 TABLET, FILM COATED ORAL at 07:55

## 2019-09-06 RX ADMIN — Medication: at 11:32

## 2019-09-06 RX ADMIN — ALLOPURINOL 100 MG: 100 TABLET ORAL at 07:55

## 2019-09-06 RX ADMIN — MELATONIN 4000 UNITS: at 07:55

## 2019-09-06 RX ADMIN — SIMVASTATIN 40 MG: 40 TABLET, FILM COATED ORAL at 21:38

## 2019-09-06 RX ADMIN — DONEPEZIL HYDROCHLORIDE 5 MG: 5 TABLET, FILM COATED ORAL at 21:38

## 2019-09-06 RX ADMIN — PAROXETINE HYDROCHLORIDE 40 MG: 40 TABLET, FILM COATED ORAL at 21:38

## 2019-09-06 RX ADMIN — ACETAMINOPHEN 975 MG: 325 TABLET, FILM COATED ORAL at 14:48

## 2019-09-06 RX ADMIN — ACETAMINOPHEN 975 MG: 325 TABLET, FILM COATED ORAL at 06:23

## 2019-09-06 RX ADMIN — ROPINIROLE HYDROCHLORIDE 1 MG: 0.25 TABLET, FILM COATED ORAL at 11:32

## 2019-09-06 RX ADMIN — BACLOFEN 10 MG: 10 TABLET ORAL at 07:55

## 2019-09-06 RX ADMIN — ROPINIROLE HYDROCHLORIDE 1 MG: 0.25 TABLET, FILM COATED ORAL at 19:14

## 2019-09-06 RX ADMIN — CARBIDOPA AND LEVODOPA 2 TABLET: 25; 100 TABLET ORAL at 19:15

## 2019-09-06 RX ADMIN — Medication 1 MG: at 02:52

## 2019-09-06 RX ADMIN — ACETAMINOPHEN 975 MG: 325 TABLET, FILM COATED ORAL at 21:38

## 2019-09-06 RX ADMIN — GABAPENTIN 800 MG: 800 TABLET, FILM COATED ORAL at 11:40

## 2019-09-06 RX ADMIN — CARBIDOPA AND LEVODOPA 2 TABLET: 25; 100 TABLET ORAL at 14:48

## 2019-09-06 RX ADMIN — BACLOFEN 10 MG: 10 TABLET ORAL at 21:38

## 2019-09-06 RX ADMIN — Medication: at 19:15

## 2019-09-06 RX ADMIN — CARBIDOPA AND LEVODOPA 2 TABLET: 25; 100 TABLET ORAL at 07:55

## 2019-09-06 RX ADMIN — ALLOPURINOL 100 MG: 100 TABLET ORAL at 19:15

## 2019-09-06 RX ADMIN — ALLOPURINOL 100 MG: 100 TABLET ORAL at 14:48

## 2019-09-06 ASSESSMENT — ACTIVITIES OF DAILY LIVING (ADL)
ADLS_ACUITY_SCORE: 20
ADLS_ACUITY_SCORE: 22
ADLS_ACUITY_SCORE: 20

## 2019-09-06 ASSESSMENT — VISUAL ACUITY
OU: BASELINE

## 2019-09-06 NOTE — PROGRESS NOTES
Cognistat    SUMMARY OF TEST:    The Cognistat is designed to assess functioning in five major areas:  Language, Constructions, Memory, Calculations and Reasoning.  Attention, Level of Consciousness and Orientation are assessed independently.  Scores are plotted on a profile which illustrates the overall pattern of abilities and disabilities.  Scores can be compared to norms and/or representative profiles for various populations.    DATE OF TESTIN2019    RESULTS OF TESTING:    This patient scores in the average range for Level of consciousness, orientation, language-comprehension, reasoning-similarities, and reasoning-judgement.     This patient scores in the mildly impaired for Memory     This patient scores in the mild to moderately impaired for attention and language-repetition.     This patient scores in the moderately impaired for Calculations     This patient scores in the moderately to severely impaired for constructional ability.     Test results comments:  Writer minimized distractions by having door closed with no other individuals in room.     INTERPRETATION OF TEST RESULTS:    Pt having most difficulty with attention, language-repetition, calculations, and constructional ability which can lead to safety issues at home. Mild deficits in memory with pt needing extra time for processing during assessment.     Factors affecting performance:  None.    Recommendations:  Pt demonstrating the cognitive deficits listed above which can lead to safety issues at home and difficulty sequencing and processing through daily ADL/IADL tasks specifically recommend supervision with g/h, bathing, and home management.     TIME ADMINISTERING TEST: 25    TIME FOR INTERPRETATION AND PREPARATION OF REPORT: 15    TOTAL TIME: 40

## 2019-09-06 NOTE — PLAN OF CARE
Status: Admitted s/p AMS and fainting spell. Hx of seizures.  VS: VSS on RA ex bradycardic in 50s majority of shift; CCM. Lowest witnessed HR 36 when pt sleeping. Asymptomatic when HR is low  Neuros: A&Ox4. Slow to respond at times. Forgetful. Mild word finding difficulty at times. [place]. BLE 4/5. Baseline neuropathy BLE; No events this shift  GI: Tolerating regular diet. Insulin coverage started this shift. No BM this shift  : Voiding w/out difficulty  IV: PIV SL  Activity: Ax1, gb, walker. Encourage walking this evening  Pain: C/o intermittent leg pain d/t neuropathy and cramping. Tylenol and lidocaine cream given x1. Gabapentin restarted today w/relief  Skin: EEG leads intact; Baseline rash on abdomen and legs  Labs/Tests: OT eval completed, PT still to see pt  Social: Wife at bedside, supportive  Plan of care: No discharge plan at this time, awaiting therapy recs.

## 2019-09-06 NOTE — PLAN OF CARE
"Pt. admitted for further evaluation of AMS. VS include: SpO2 in high 90's on RA but requiring 1L NC during sleep d/t periods of apnea, HTN within parameters, and intermittent bradycardia (average of 39-43bpm while resting with lowest HR seen being 36bpm);MD notified and instructed to notify if pt. becomes symptomatic. On CCM. A&Ox4. Neuros include: intermittently confused, word-finding difficulty, slow to respond at times, R. lip swelling,diminished hearing, BUE 5/5 with RUE slightly weaker d/t R. shoulder pain, BLE 4/5 with RLE slightly weaker than L, baseline neuropathy to BLE. Tolerating regular diet. Voids spontaneously without difficulty. No BM this shift. Up A1/GB/Walker. Bilateral PIV, SL. BLE edema present, elevated on pillows. C/o L. knee pain managed with hot packs this shift. Anticipated d/c in 2-3 days. Continue to monitor and follow POC.     **Pt. Pressed call light around 0600 stating that he felt \"off and panicky and a little lightheaded.\" VS and neuros stable and unchanged at this time. Went to the bathroom and ate some toast and starting to feel better continue to monitor  "

## 2019-09-06 NOTE — PLAN OF CARE
Status: Admitted for AMS and fainting spell. Hx of seizures. Currently on VEEG monitoring. Pt did have period of sweating and wife reporting that his eyes did not look right, so seizure button was pressed and techs notified. Completed extra CIWA which was up from a 1 to 4 and gave 0.5 mg of ativan,.  Vitals: VSS, HTN within parameters. CCM-NSR/Yovanny  Neuros: A/O x4. Intermittent confusion/forgetfulness. BLE 4/5. BUE 5/5. BLE neuropathy at baseline. Pt does have mild dementia/parkinsons.  IV: PIV x2 SL  Resp/trach: RA O2 sats stable, Lungs clear.  Diet: Reg diet.   Bowel status: Loose stools this shift. Bowel meds held  : Voiding  Skin: VEEG leads in place. Rash on legs/stomach (has seen derm in past for rash)  Pain: R shoulder pain from fall and bilat. Foot pain/neuropathy. Gabapentin/tylenol/lidocaine patch/and topical cream for neuropathy given.  Activity: Up w/1 GB/Walker  Social: Wife at bedside  Plan: Continue to monitor and follow POC.

## 2019-09-06 NOTE — PROGRESS NOTES
"[facility], [place]   Neurology Daily Note  Jose Loco  [id number]  09/06/2019    Subjective:  Last night around 9pm, pt became isauro with HR 35, feeling dizzy when he went to the bathroom, sweating per wife, CIWA 4 - given ativan 0.5. Later at night, complaining of foot pain/neuropathy - wanting his pain meds (norco and gabapentin); given home gabapentin; symptoms improved.This morning around 6am, pt complaining of restlessness/anxiety - he ate his breakfast and went to bathroom, improved.  EEG thus far shows encephalopathy, rules out non-convulsive status epilepticus; no seizures/epileptiform activity.     Objective:  Physical Examination   Vitals: BP (!) 146/80   Pulse 52   Temp 97.8  F (36.6  C) (Oral)   Resp 16   Ht 1.727 m (5' 8\")   Wt 99.8 kg (220 lb)   SpO2 98%   BMI 33.45 kg/m    General: Adult male patient, lying in bed, NAD  HEENT: NC/AT, no icterus, op pink and moist  Chest: non-labored on RA  Extremities: Warm, LE edema  Skin: mild dermatitis changes on both shins   Psych: Mood pleasant, affect congruent  Neuro:  Mental status: Awake, alert, attentive, oriented to self, time, place, and circumstance. Language is slowed, slow comprehension of complex commands, naming intact  Cranial nerves: VFF, PERRL, conjugate gaze, EOMI, facial sensation intact, face symmetric, hearing intact to conversation, shoulder shrug strong, tongue/uvula midline  Motor: Normal bulk and tone. No abnormal movements. No tremor observed. No pronator drift. 5/5 strength in 4/4 extremities.   Reflexes: deferred  Sensory: Decreased sensation to pinprick up to mid-calf in left leg. Intact sensation in RLE and bilateral upper extremities.   Coordination: FNF without ataxia or dysmetria. Heel shin on R with some difficulty,  normal on the left.   Gait: Deferred    Investigations    MRI - 9/4/19  1. No acute intracranial pathology. No restricted diffusion to suggest cerebral infarction.  2. " No abnormal enhancement.     CT/CTA head and neck - 9/4/19  1. Head CTA demonstrates no aneurysm or stenosis of the major intracranial arteries.  2. Neck CTA demonstrates no stenosis of the major cervical arteries.  3. No intracranial hemorrhage on the noncontrast head CT.    Echo - 9/5/19  Global and regional left ventricular function is normal with an EF of 60-65%.  Borderline right ventricular enlargement. There is right ventricular  hypertrophy with normal systolic function.  No significant valve dysfunction.  The peak velocity of the tricuspid regurgitant jet is not obtainable.  Pulmonary artery systolic pressure cannot be assessed.  No right to left shunting detected following injection of agitated saline.    -Troponin I - .067, down trending    - Hepatic panel wnl     - Drug abuse screen positive for opiates     - Vitamin B12, Folate normal  - MMA pending    XR Right Shoulder - 9/5/19  No evidence of fracture or dislocation on the one axillary view was not obtained.. Mild irregularity of the humeral head.    Assessment and Plan   Jose Loco is a 63 year old male with a history of generalized seizure disorder, alcohol abuse, parkinsonism, hypertension, T2DM (diet-control) with diabetic neuropathy, HLD, ROSY, and depression admitted on 9/4/2019 with confusion after an episode of LOC. He is followed by multiple providers in the community, and has had poor f/u in the past. Pt has had large number of workups for extensive hx of possible autoimmune condition, frontotemporal dementia, and parkinsonisms.    #Loss of consciousness/AMS/fall with mild head trauma: Pt had a witnessed fall with reported LOC, subsequently having word finding difficulty and confusion (more than baseline per wife). Per wife, associated with sweating. Denies cardiac symptoms prior to the episode. Neurological examination was non-focal and work-up including CT/CTA and MRI were negative for acute intracranial pathology. No metabolic  abnormalities noted. Given his history of seizure disorder, possible that his episode of LOC with subsequent confusion could be a seizure with postictal state. May also consider syncopal episode/cardiac etiology in the setting of co-morbidities; noted to be especially bradycardic at times; echo in unremarkable. May be toxic in the setting of ETOH use disorder, opioid use for pain management and multiple dopaminergic medications. Lastly, confusion may be related to his underlying cognitive impairment/suspected dementia (see below).   - B12/thiamine normal, MMA pending  - Seizure management as below  - Cardiac monitoring  - Echocardiogram largely unremarkable  - EKG with sinus bradycardia  - Hold sedating meds  - PT/OT    #Sinus Bradycardia   It appears it was charted in 2016. Has been isauro down to 35 at night, reportedly felt dizzy. Echo unremarkable. May be due to his home donepezil so will decrease the dose. Unclear if this could be the sole reason.  - Decrease PTA donepezil 10 mg to 5 mg 9/6  - Cardiac monitoring  - Echocardiogram largely unremarkable    #Hx of generalized seizure disorder  Began 6 yrs ago. Per chart, he would sit with face forward staring for 5-10 min. Later seizures were characterized as drop attacks, with head jerking back, involuntary grunts like tics, lasting 5-20 min with no recollection afterwards. EEG in 11/2018 with findings indicative of a generalized seizure tendency, supporting a diagnosis of generalized epilepsy; seizures were not recorded. Since starting levetiracetam, last grand mal seizure ~2 yrs ago; keppra was increased and seizures have been well-controlled since then.  - Continue Levetiracetam 1500 mg BID  - Continuous EEG to monitor for seizure activity --> EEG thus far shows encephalopathy, rules out non-convulsive status epilepticus; no seizures/epileptiform activity noted despite button being pressed for a spell  - Seizure precautions  - Given LOC episode, no driving for 3  months    #Alcohol use disorder   Pt has had success in the past with outpatient treatment - had been sober for a while until recently. Last drink on Tuesday, 9/3.  On 9/5 night, CIWA 4 though he was bradycardic; given ativan 0.5 mg.   - On CIWA protocol, with Ativan ordered  - Thiamine and folate supplementation  - Chemical Dependency consulted, pt not a good candidate for inpatient program given his cognitive status  - Encourage cessation    #Right shoulder pain   XR unremarkable. Pain not stabilized with tylenol so ordered ketorolac for pain management.     #Mild cognitive impairment  Likely multifactorial including persistent alcohol use (6 drinks/day) and use of opiate pain meds. He has been following with neurology for dementia work up.   - Continue Donepezil 10 mg daily     #Concern for Parkinsonism  Seen by Dr. Ochoa in clinic earlier this year for tremors in head and arms. No changes in motor function. He had a ALEXANDRA scan on 5/25/19 which was unremarkable. Started sinemet - which reportedly has been helping  - Continue PTA Sinemet    #RLS  - Continue Ropinirole 1 mg QID  - Holding Neupro 3 mg patch  - Will consider cutting down on dopamine agonist drugs    #T2DM  HgbA1C 6.1 (07/2019). Not on any meds at home. Stable at this time.   - Monitor BG  - Sliding scale, low dose  - Will discharge with plan for follow up with PCP    #HTN  Not on antihypertensives. Noted to have elevated blood pressure. May be due to ETOH withdrawal, though he likely has high BP at baseline that is not treated.   - Monitor BP for now   - Will discharge with plan for follow up with PCP    #HLD  - Continue Simvastatin 40 mg daily     #ROSY  Not on CPAP.    #Pain management  #Neuropathic pain  #Depression  - Continue paroxetine 40 mg daily   - Resume PTA gabapentin 800 TID  - Continue PTA methadone 5-10 mg at bedtime  - Will discharge with MTM referral       FEN: combination adult diet  PPx: Enoxaparin   Code: Full code    Dispo: 2 to 3  days    Patient was seen and discussed with Dr. Ochoa.    Sera Becker MD  Neurology PGY2  [phone]    PATIENT SEEN AND EXAMINED BY ME on September 6, 2019 I AGREE WITH THE FINDINGS DETAILED BY THE NEUROLOGY RESIDENT and documented in their note on September 6, 2019   PLEASE REFER TO THEIR NOTE FOR ADDITIONAL DETAILS.       POPPY OCHOA MD  September 6, 2019

## 2019-09-06 NOTE — PROGRESS NOTES
09/06/19 1446   Quick Adds   Type of Visit Initial Occupational Therapy Evaluation   Living Environment   Lives With spouse   Living Arrangements house   Home Accessibility stairs to enter home;stairs within home   Number of Stairs, Main Entrance 2   Stair Railings, Main Entrance railings on both sides of stairs   Number of Stairs, Within Home, Primary 8  (split level)   Stair Railings, Within Home, Primary railing on right side (ascending)   Transportation Anticipated car, drives self;family or friend will provide  (Pt reported madhu driving in town.)   Living Environment Comment Pt reported having a walk in shower with a chair he uses. Bedroom and BR downstairs with pt needing to complete 8 steps.    Self-Care   Usual Activity Tolerance moderate   Current Activity Tolerance fair   Regular Exercise No   Equipment Currently Used at Home shower chair   Activity/Exercise/Self-Care Comment Pt reported being retired and he enjoys yardwork and being outside.    Functional Level   Ambulation 1-->assistive equipment  (Uses SPC ocassionally. )   Transferring 0-->independent   Toileting 0-->independent   Bathing 1-->assistive equipment   Dressing 0-->independent   Eating 0-->independent   Communication 2-->difficulty understanding and speaking (not related to language barrier)   Swallowing 0-->swallows foods/liquids without difficulty   Cognition 1 - attention or memory deficits   Fall history within last six months yes   Number of times patient has fallen within last six months 1   Which of the above functional risks had a recent onset or change? fall history;communication/speech;cognition;ambulation;transferring   Prior Functional Level Comment Pt required SPC occasionally when feeling fatigued, used shower chair. Otherwise I with ADLs.    General Information   Onset of Illness/Injury or Date of Surgery - Date 09/04/19   Referring Physician Sera Becker MD   Patient/Family Goals Statement Pt wants to return home  and improve ADL I.    Additional Occupational Profile Info/Pertinent History of Current Problem 63 year old male admitted on 9/4/2019. He has a history of generalized seizure disorder, alcohol abuse, parkinsonism, hypertension, T2DM (diet-control) with diabetic neuropathy, HLD, ROSY, and depression, presented with confusion for 1 day.   Precautions/Limitations fall precautions;seizure precautions   Weight-Bearing Status - LUE full weight-bearing   Weight-Bearing Status - RUE full weight-bearing   Weight-Bearing Status - LLE full weight-bearing   Weight-Bearing Status - RLE full weight-bearing   General Observations Continuous EEG monitoring and seizure precautions at this time.    General Info Comments Activity: Up ad candice.   Cognitive Status Examination   Orientation orientation to person, place and time   Level of Consciousness alert   Follows Commands (Cognition) WNL   Memory impaired   Cognitive Comment See Cognistat   Visual Perception   Visual Perception Comments Pt reported using glasses for reading.    Sensory Examination   Sensory Comments Pt reported having B peripheral neuropathy at baseline.    Range of Motion (ROM)   ROM Comment BUE WNL.    Strength   Strength Comments BUE 5/5 for shoulder and elbow flex/ext.    Coordination   Coordination Comments B  strength 5/5.    Instrumental Activities of Daily Living (IADL)   Previous Responsibilities yardwork;housekeeping;laundry;driving   IADL Comments Pt reported being I in all IADLs however wife assists with meal prep and med management. Wife is a former [employer]. Wife is retired.    Activities of Daily Living Analysis   Impairments Contributing to Impaired Activities of Daily Living balance impaired;cognition impaired;sensation decreased   General Therapy Interventions   Planned Therapy Interventions ADL retraining;IADL retraining;cognition;transfer training;home program guidelines;progressive activity/exercise   Clinical Impression   Criteria for Skilled  "Therapeutic Interventions Met yes, treatment indicated   OT Diagnosis Decreased ADL I.    Influenced by the following impairments Cognition, sensation, functional endurance.    Assessment of Occupational Performance 3-5 Performance Deficits   Identified Performance Deficits g/h, dressing, bathing, home management, toileting.    Clinical Decision Making (Complexity) Low complexity   Therapy Frequency 5x/week   Predicted Duration of Therapy Intervention (days/wks) 1 week.    Anticipated Discharge Disposition Home with Assist;Transitional Care Facility   Risks and Benefits of Treatment have been explained. Yes   Patient, Family & other staff in agreement with plan of care Yes   Clinical Impression Comments Pt to benefit from skilled OT to address cognition and functional endurance to promote ADL I. See daily flowsheet for treatment details.    [facility] AM-PAC  \"6 Clicks\" Daily Activity Inpatient Short Form   1. Putting on and taking off regular lower body clothing? 3 - A Little   2. Bathing (including washing, rinsing, drying)? 2 - A Lot   3. Toileting, which includes using toilet, bedpan or urinal? 3 - A Little   4. Putting on and taking off regular upper body clothing? 3 - A Little   5. Taking care of personal grooming such as brushing teeth? 3 - A Little   6. Eating meals? 4 - None   Daily Activity Raw Score (Score out of 24.Lower scores equate to lower levels of function) 18   Total Evaluation Time   Total Evaluation Time (Minutes) 8     "

## 2019-09-06 NOTE — PROGRESS NOTES
SW received a 9/5/19 3:27pm call from Lori Wade, CD counselor who stated that there are no CD treatment centers that would accept pt.    HERMAN Isaac  Social Work, 6A  Phone:  [phone]  Pager:  [phone]  9/6/2019

## 2019-09-06 NOTE — PLAN OF CARE
Discharge Planner OT   Patient plan for discharge: Home with assist.   Current status: Pt completed bed mobility using SBA for line and bed alarm management. Pt ambulated <>bed room door using CGA and FWW with unsteadiness noted. Assessed pt's cognition through Cognistat, see progress note for details. Pt demonstrating most deficits in language-repetition, constructional ability, calculations, and attention. Pt offered good insight when discussing results stating that constructing the shapes was most difficult. Educated pt on implications of results into everyday activities.   Barriers to return to prior living situation: Medical status, cognition, balance, and decreased ADL/IADL I.  Recommendations for discharge: Home with assist pending further ADL assessment and amount of physical assistance wife is able to provide at home.   Rationale for recommendations: Pt is motivated for therapy and anticipate pt will progress to home with assist from wife for ADL/IADL tasks pending LOS and amount of assist wife can provide.        Entered by: Cecilia Carter 09/06/2019 4:48 PM     OT 6A

## 2019-09-06 NOTE — PROGRESS NOTES
PATIENT SEEN AND EXAMINED BY ME on September 6, 2019 I AGREE WITH THE FINDINGS DETAILED BY THE NEUROLOGY RESIDENT and documented in their note on September 6, 2019   PLEASE REFER TO THEIR NOTE FOR ADDITIONAL DETAILS.       POPPY KIM MD  September 6, 2019

## 2019-09-06 NOTE — PROGRESS NOTES
INITIAL REPORT of Video-EEG Monitoring              DATE OF RECORDIN2019         I reviewed the first 2 hours of video-EEG monitoring of Jose Loco.         The EEG during waking was abnormal due to generalized theta slowing of midline frontocentral maximum, with predominance of faster alpha-beta activities bilaterally.  No interictal epileptiform abnormalities and no electrographic seizures were observed.         These abnormalities indicate mild electrographic encephalopathy.  This recording excludes non-convulsive status epilepticus as a possible cause of this encephalopathy.    Screening for intermittent seizures will require a longer period of recording.   Amanuel Renee M.D., [facility] [phone]

## 2019-09-07 ENCOUNTER — APPOINTMENT (OUTPATIENT)
Dept: PHYSICAL THERAPY | Facility: CLINIC | Age: 64
DRG: 309 | End: 2019-09-07
Attending: STUDENT IN AN ORGANIZED HEALTH CARE EDUCATION/TRAINING PROGRAM
Payer: COMMERCIAL

## 2019-09-07 ENCOUNTER — ALLIED HEALTH/NURSE VISIT (OUTPATIENT)
Dept: NEUROLOGY | Facility: CLINIC | Age: 64
DRG: 309 | End: 2019-09-07
Attending: PSYCHIATRY & NEUROLOGY
Payer: COMMERCIAL

## 2019-09-07 ENCOUNTER — APPOINTMENT (OUTPATIENT)
Dept: OCCUPATIONAL THERAPY | Facility: CLINIC | Age: 64
DRG: 309 | End: 2019-09-07
Attending: STUDENT IN AN ORGANIZED HEALTH CARE EDUCATION/TRAINING PROGRAM
Payer: COMMERCIAL

## 2019-09-07 DIAGNOSIS — G93.40 ENCEPHALOPATHY: Primary | ICD-10-CM

## 2019-09-07 LAB
ANION GAP SERPL CALCULATED.3IONS-SCNC: 3 MMOL/L (ref 3–14)
BUN SERPL-MCNC: 11 MG/DL (ref 7–30)
CALCIUM SERPL-MCNC: 9.1 MG/DL (ref 8.5–10.1)
CHLORIDE SERPL-SCNC: 109 MMOL/L (ref 94–109)
CO2 SERPL-SCNC: 31 MMOL/L (ref 20–32)
CREAT SERPL-MCNC: 0.79 MG/DL (ref 0.66–1.25)
ERYTHROCYTE [DISTWIDTH] IN BLOOD BY AUTOMATED COUNT: 12 % (ref 10–15)
GFR SERPL CREATININE-BSD FRML MDRD: >90 ML/MIN/{1.73_M2}
GLUCOSE BLDC GLUCOMTR-MCNC: 105 MG/DL (ref 70–99)
GLUCOSE BLDC GLUCOMTR-MCNC: 111 MG/DL (ref 70–99)
GLUCOSE BLDC GLUCOMTR-MCNC: 135 MG/DL (ref 70–99)
GLUCOSE BLDC GLUCOMTR-MCNC: 173 MG/DL (ref 70–99)
GLUCOSE BLDC GLUCOMTR-MCNC: 61 MG/DL (ref 70–99)
GLUCOSE BLDC GLUCOMTR-MCNC: 78 MG/DL (ref 70–99)
GLUCOSE BLDC GLUCOMTR-MCNC: 96 MG/DL (ref 70–99)
GLUCOSE BLDC GLUCOMTR-MCNC: 97 MG/DL (ref 70–99)
GLUCOSE SERPL-MCNC: 107 MG/DL (ref 70–99)
HCT VFR BLD AUTO: 41.1 % (ref 40–53)
HGB BLD-MCNC: 13 G/DL (ref 13.3–17.7)
MCH RBC QN AUTO: 33.5 PG (ref 26.5–33)
MCHC RBC AUTO-ENTMCNC: 31.6 G/DL (ref 31.5–36.5)
MCV RBC AUTO: 106 FL (ref 78–100)
PLATELET # BLD AUTO: 229 10E9/L (ref 150–450)
POTASSIUM SERPL-SCNC: 4.3 MMOL/L (ref 3.4–5.3)
RBC # BLD AUTO: 3.88 10E12/L (ref 4.4–5.9)
SODIUM SERPL-SCNC: 142 MMOL/L (ref 133–144)
WBC # BLD AUTO: 8.2 10E9/L (ref 4–11)

## 2019-09-07 PROCEDURE — 12000001 ZZH R&B MED SURG/OB UMMC

## 2019-09-07 PROCEDURE — 80048 BASIC METABOLIC PNL TOTAL CA: CPT | Performed by: PSYCHIATRY & NEUROLOGY

## 2019-09-07 PROCEDURE — 25000132 ZZH RX MED GY IP 250 OP 250 PS 637: Performed by: STUDENT IN AN ORGANIZED HEALTH CARE EDUCATION/TRAINING PROGRAM

## 2019-09-07 PROCEDURE — 97116 GAIT TRAINING THERAPY: CPT | Mod: GP

## 2019-09-07 PROCEDURE — 00000146 ZZHCL STATISTIC GLUCOSE BY METER IP

## 2019-09-07 PROCEDURE — 97112 NEUROMUSCULAR REEDUCATION: CPT | Mod: GP

## 2019-09-07 PROCEDURE — 95951 ZZHC EEG VIDEO EACH 24 HR: CPT | Mod: ZF

## 2019-09-07 PROCEDURE — 97530 THERAPEUTIC ACTIVITIES: CPT | Mod: GP

## 2019-09-07 PROCEDURE — 80048 BASIC METABOLIC PNL TOTAL CA: CPT | Performed by: STUDENT IN AN ORGANIZED HEALTH CARE EDUCATION/TRAINING PROGRAM

## 2019-09-07 PROCEDURE — 97530 THERAPEUTIC ACTIVITIES: CPT | Mod: GO

## 2019-09-07 PROCEDURE — 25000131 ZZH RX MED GY IP 250 OP 636 PS 637: Performed by: STUDENT IN AN ORGANIZED HEALTH CARE EDUCATION/TRAINING PROGRAM

## 2019-09-07 PROCEDURE — 97535 SELF CARE MNGMENT TRAINING: CPT | Mod: GO

## 2019-09-07 PROCEDURE — 97162 PT EVAL MOD COMPLEX 30 MIN: CPT | Mod: GP

## 2019-09-07 PROCEDURE — 85027 COMPLETE CBC AUTOMATED: CPT | Performed by: PSYCHIATRY & NEUROLOGY

## 2019-09-07 PROCEDURE — 36415 COLL VENOUS BLD VENIPUNCTURE: CPT | Performed by: PSYCHIATRY & NEUROLOGY

## 2019-09-07 PROCEDURE — 25000128 H RX IP 250 OP 636: Performed by: STUDENT IN AN ORGANIZED HEALTH CARE EDUCATION/TRAINING PROGRAM

## 2019-09-07 RX ORDER — ROPINIROLE 0.25 MG/1
0.5 TABLET, FILM COATED ORAL EVERY 6 HOURS PRN
Status: DISCONTINUED | OUTPATIENT
Start: 2019-09-07 | End: 2019-09-08 | Stop reason: HOSPADM

## 2019-09-07 RX ORDER — LORAZEPAM 1 MG/1
1 TABLET ORAL ONCE
Status: COMPLETED | OUTPATIENT
Start: 2019-09-07 | End: 2019-09-07

## 2019-09-07 RX ADMIN — ASPIRIN 81 MG: 81 TABLET, DELAYED RELEASE ORAL at 08:18

## 2019-09-07 RX ADMIN — ALLOPURINOL 100 MG: 100 TABLET ORAL at 08:17

## 2019-09-07 RX ADMIN — Medication: at 09:05

## 2019-09-07 RX ADMIN — LIDOCAINE 1 PATCH: 560 PATCH PERCUTANEOUS; TOPICAL; TRANSDERMAL at 05:38

## 2019-09-07 RX ADMIN — MELATONIN 4000 UNITS: at 08:16

## 2019-09-07 RX ADMIN — ROPINIROLE HYDROCHLORIDE 0.5 MG: 0.25 TABLET, FILM COATED ORAL at 17:06

## 2019-09-07 RX ADMIN — GABAPENTIN 800 MG: 800 TABLET, FILM COATED ORAL at 05:32

## 2019-09-07 RX ADMIN — BACLOFEN 10 MG: 10 TABLET ORAL at 08:17

## 2019-09-07 RX ADMIN — METHADONE HYDROCHLORIDE 5 MG: 5 TABLET ORAL at 19:48

## 2019-09-07 RX ADMIN — Medication 400 MCG: at 08:17

## 2019-09-07 RX ADMIN — CARBIDOPA AND LEVODOPA 2 TABLET: 25; 100 TABLET ORAL at 08:18

## 2019-09-07 RX ADMIN — INSULIN ASPART 1 UNITS: 100 INJECTION, SOLUTION INTRAVENOUS; SUBCUTANEOUS at 17:01

## 2019-09-07 RX ADMIN — SENNOSIDES AND DOCUSATE SODIUM 1 TABLET: 8.6; 5 TABLET ORAL at 21:04

## 2019-09-07 RX ADMIN — CARBIDOPA AND LEVODOPA 2 TABLET: 25; 100 TABLET ORAL at 13:50

## 2019-09-07 RX ADMIN — LEVETIRACETAM 1500 MG: 750 TABLET, FILM COATED ORAL at 08:17

## 2019-09-07 RX ADMIN — Medication: at 01:21

## 2019-09-07 RX ADMIN — Medication 100 MG: at 08:18

## 2019-09-07 RX ADMIN — Medication: at 16:51

## 2019-09-07 RX ADMIN — LORAZEPAM 1 MG: 1 TABLET ORAL at 21:47

## 2019-09-07 RX ADMIN — ALLOPURINOL 100 MG: 100 TABLET ORAL at 13:50

## 2019-09-07 RX ADMIN — GABAPENTIN 800 MG: 800 TABLET, FILM COATED ORAL at 21:04

## 2019-09-07 RX ADMIN — DONEPEZIL HYDROCHLORIDE 5 MG: 5 TABLET, FILM COATED ORAL at 21:04

## 2019-09-07 RX ADMIN — ENOXAPARIN SODIUM 40 MG: 40 INJECTION SUBCUTANEOUS at 08:18

## 2019-09-07 RX ADMIN — SIMVASTATIN 40 MG: 40 TABLET, FILM COATED ORAL at 21:04

## 2019-09-07 RX ADMIN — LEVETIRACETAM 1500 MG: 750 TABLET, FILM COATED ORAL at 19:48

## 2019-09-07 RX ADMIN — ACETAMINOPHEN 975 MG: 325 TABLET, FILM COATED ORAL at 16:51

## 2019-09-07 RX ADMIN — ROTIGOTINE 1 PATCH: 4 PATCH, EXTENDED RELEASE TRANSDERMAL at 13:51

## 2019-09-07 RX ADMIN — CARBIDOPA AND LEVODOPA 2 TABLET: 25; 100 TABLET ORAL at 19:49

## 2019-09-07 RX ADMIN — ALLOPURINOL 100 MG: 100 TABLET ORAL at 19:48

## 2019-09-07 RX ADMIN — GABAPENTIN 800 MG: 800 TABLET, FILM COATED ORAL at 13:50

## 2019-09-07 RX ADMIN — BACLOFEN 10 MG: 10 TABLET ORAL at 21:04

## 2019-09-07 RX ADMIN — PAROXETINE HYDROCHLORIDE 40 MG: 40 TABLET, FILM COATED ORAL at 21:04

## 2019-09-07 RX ADMIN — ROPINIROLE HYDROCHLORIDE 1 MG: 0.25 TABLET, FILM COATED ORAL at 05:32

## 2019-09-07 ASSESSMENT — ACTIVITIES OF DAILY LIVING (ADL)
ADLS_ACUITY_SCORE: 18
ADLS_ACUITY_SCORE: 20

## 2019-09-07 NOTE — PROGRESS NOTES
Status: Admitted for AMS and fainting spell. Hx of seizures. VEEG monitoring discontinued. On CCM.  Vitals: Hypertensive, bradycardia (50s).  Neuros: A&O x4. Intermittently forgetful. 5/5. BLE N/T/neuropathy at baseline.   IV: PIV x2 SL.  Resp/trach: No SOB, room air.  Diet: Regular diet, carb counts. Good appetite.  Bowel status: BS+, 1 small BM with blood, MD notified.  : Voiding spontaneously.  Skin: Rash on abdomen and BLE. Dermatology has managed this at home in the past. Pt reports having this rash for years.   Pain: Denies pain. Reports mild cramping with BM.  Activity: Ax1/GB & walker.  Social: Wife at bedside.  Plan: Continue CIWA screening. Continue to monitor and follow POC.

## 2019-09-07 NOTE — PROGRESS NOTES
"[facility], [place]   Neurology Daily Note  Jose Loco  [id number]  09/07/2019    Subjective:  No overnight events. EEG unremarkable; will discontinue. He is doing well this morning overall.    Objective:  Physical Examination   Vitals: BP (!) 155/79 (BP Location: Left arm)   Pulse 52   Temp 98.4  F (36.9  C) (Oral)   Resp 16   Ht 1.727 m (5' 8\")   Wt 99.8 kg (220 lb)   SpO2 97%   BMI 33.45 kg/m    General: Adult male patient, lying in bed, NAD  HEENT: NC/AT, no icterus, op pink and moist  Chest: non-labored on RA  Extremities: Warm, LE edema  Skin: mild dermatitis changes on both shins   Psych: Mood pleasant, affect congruent  Neuro:  Mental status: Awake, alert, attentive, oriented to self, time, place, and circumstance. Language is slowed, slow comprehension of complex commands, naming intact  Cranial nerves: VFF, PERRL, conjugate gaze, EOMI, facial sensation intact, face symmetric, hearing intact to conversation, shoulder shrug strong, tongue/uvula midline  Motor: Normal bulk and tone. No abnormal movements. No tremor observed. No pronator drift. 5/5 strength in 4/4 extremities.   Reflexes: deferred  Sensory: Decreased sensation to pinprick up to mid-calf in left leg. Intact sensation in RLE and bilateral upper extremities.   Coordination: FNF without ataxia or dysmetria. Heel shin on R with some difficulty,  normal on the left.   Gait: Deferred    Investigations    MRI - 9/4/19  1. No acute intracranial pathology. No restricted diffusion to suggest cerebral infarction.  2. No abnormal enhancement.     CT/CTA head and neck - 9/4/19  1. Head CTA demonstrates no aneurysm or stenosis of the major intracranial arteries.  2. Neck CTA demonstrates no stenosis of the major cervical arteries.  3. No intracranial hemorrhage on the noncontrast head CT.    Echo - 9/5/19  Global and regional left ventricular function is normal with an EF of 60-65%.  Borderline right " ventricular enlargement. There is right ventricular  hypertrophy with normal systolic function.  No significant valve dysfunction.  The peak velocity of the tricuspid regurgitant jet is not obtainable.  Pulmonary artery systolic pressure cannot be assessed.  No right to left shunting detected following injection of agitated saline.    -Troponin I - .067, down trending    - Hepatic panel wnl     - Drug abuse screen positive for opiates     - Vitamin B12, Folate normal  - MMA pending    XR Right Shoulder - 9/5/19  No evidence of fracture or dislocation on the one axillary view was not obtained.. Mild irregularity of the humeral head.    EEG report 9/6:  SUMMARY OF DAY 1 OF VIDEO-EEG MONITORING:  The interictal EEG recording during waking, drowsiness and slow wave sleep was abnormal due to generalized theta slowing during waking.  No interictal epileptiform abnormalities, no electrographic seizures, and no paroxysmal behavioral events were recorded on this day of monitoring.  These findings indicate mild generalized cerebral dysfunction.    Assessment and Plan   Jose Loco is a 63 year old male with a history of generalized seizure disorder, alcohol abuse, parkinsonism, hypertension, T2DM (diet-control) with diabetic neuropathy, HLD, ROSY, and depression admitted on 9/4/2019 with confusion after an episode of LOC. He is followed by multiple providers in the community, and has had poor f/u in the past. Pt has had large number of workups for extensive hx of possible autoimmune condition, frontotemporal dementia, and parkinsonisms.    #Loss of consciousness  #Acute encephalopathy  Pt had a witnessed fall with reported LOC, subsequently having word finding difficulty and confusion (more than baseline per wife). Per wife, associated with sweating. Denies cardiac symptoms prior to the episode. Neurological examination was non-focal and work-up including CT/CTA and MRI were negative for acute intracranial  pathology. No metabolic abnormalities noted. Given his history of seizure disorder, possible that his episode of LOC with subsequent confusion could be a seizure with postictal state. May also consider syncopal episode/cardiac etiology especially give his profound intermittent bradycardia at times; echo in unremarkable. May be toxic in the setting of ETOH use disorder, opioid use for pain management and multiple dopaminergic medications. Lastly, confusion may be related to his underlying cognitive impairment/suspected dementia (see below).   - B12/thiamine normal, MMA pending  - Seizure management as below  - Cardiac monitoring  - Echocardiogram largely unremarkable  - EKG with sinus bradycardia  - Hold sedating meds  - PT/OT    #Sinus Bradycardia   It appears it was charted in 2016. He has been isauro down to 35 particularly at night. On 9/4 night, he reportedly felt dizzy when he got up to use bathroom with HR 35-40. Echo unremarkable. Spoke with cardiology to see if need a consult/inpatient work up - no need for any immediate action. ROSY and donepezil could be a small component but not likely the entire reason. Cardio fellow recommended outpatient exercise stress EKG to see if he is able to increase his HR with exercise - if not, he may have chronotropic incompetence which could be cause of his syncopal episode and would be a reason to get pacemaker.   - Decrease PTA donepezil 10 mg to 5 mg 9/6  - Cardiac monitoring  - Echocardiogram largely unremarkable  - Outpatient exercise stress EKG   - Cardiology referral on discharge    #Hx of generalized seizure disorder  Began 6 yrs ago. Per chart, he would sit with face forward staring for 5-10 min. Later seizures were characterized as drop attacks, with head jerking back, involuntary grunts like tics, lasting 5-20 min with no recollection afterwards. EEG in 11/2018 with findings indicative of a generalized seizure tendency, supporting a diagnosis of generalized  epilepsy; seizures were not recorded. Since starting levetiracetam, last grand mal seizure ~2 yrs ago; keppra was increased and seizures have been well-controlled since then. This may have been a possible cause of his LOC with post-ictal confusion. Video EEG consistent with encephalopathy and no seizures/epileptiform activity despite button being pressed during a spell. Question if his prior seizures were due to alcohol withdrawal given his history; encourage him to attend his epilepsy clinic appointment to further discuss if need this.  - Continue Levetiracetam 1500 mg BID  - Discontinue EEG   - Seizure precautions  - Given LOC episode, no driving for 3 months  - Follow up in epilepsy clinic with Dr. Owens to discuss if needs to be on AEDs    #Alcohol use disorder   Pt has had success in the past with outpatient treatment - had been sober for a while until recently. Last drink on Tuesday, 9/3.  On 9/5 night, CIWA 4 though he was bradycardic; given ativan 0.5 mg.   - On CIWA protocol, with Ativan ordered  - Thiamine and folate supplementation  - Chemical Dependency consulted, pt not a good candidate for inpatient program given his cognitive status  - Encourage cessation    #Right shoulder pain   XR unremarkable. Pain not stabilized with tylenol so ordered ketorolac for pain management.     #Mild cognitive impairment  Likely multifactorial including persistent alcohol use (6 drinks/day) and use of opiate pain meds. He has been following with neurology for dementia work up.   - Decreased PTA Donepezil 10 mg --> 5 mg (for bradycardia as above)  - Follow up with Dr. Barragan to determine if he needs to be on donepezil    #Concern for Parkinsonism  Seen by Dr. Ochoa in clinic earlier this year for tremors in head and arms. No changes in motor function. He had a ALEXANDRA scan on 5/25/19 which was unremarkable. Started sinemet - which reportedly has been helping  - Continue PTA Sinemet    #RLS  Would like to cut down on  dopamine agonist drugs given his mental status. Per wife, pt with somewhat good response to Neupro so will see if better with increase patch dose to 4 mg. Will make Ropinerole PRN to see how much he requires today and determine if patch dose needs to be increased to 6 mg.  - Stop PTA Ropinirole 1 mg QID  - Increase PTA Neupro 3 mg patch --> 4 mg on 9/7  - Ropinerol 0.5 PRN QID    #T2DM  HgbA1C 6.1 (07/2019). Not on any meds at home. Stable at this time.   - Monitor BG  - Sliding scale, low dose  - Will discharge with plan for follow up with PCP    #HTN  Not on antihypertensives. Noted to have elevated blood pressure. May be due to ETOH withdrawal, though he likely has high BP at baseline that is not treated.   - Monitor BP for now   - Will discharge with plan for follow up with PCP    #HLD  - Continue Simvastatin 40 mg daily     #ROSY  Not on CPAP.    #Pain management  #Neuropathic pain  #Depression  - Continue paroxetine 40 mg daily   - Resume PTA gabapentin 800 TID  - Continue PTA methadone 5-10 mg at bedtime  - Will discharge with MTM referral       FEN: combination adult diet  PPx: Enoxaparin   Code: Full code    Dispo: pending PT and monitoring for response to changes in meds    Patient was seen and discussed with Dr. Ochoa.    Sera Becker MD  Neurology PGY2  [phone]    PATIENT SEEN AND EXAMINED BY ME on September 7, 2019 I AGREE WITH THE FINDINGS DETAILED BY THE NEUROLOGY RESIDENT and documented in their note on September 7, 2019   PLEASE REFER TO THEIR NOTE FOR ADDITIONAL DETAILS.       POPPY OCHOA MD  September 7, 2019

## 2019-09-07 NOTE — PLAN OF CARE
Status: Admitted for AMS and fainting spell. Hx of seizures. Currently on VEEG monitoring. No events this shift.   Vitals: VSS, HTN within parameters. CCM-NSR/Yovanny  Neuros: A/O x4. Intermittent confusion/forgetfulness. BLE 4/5. BUE 5/5. BLE neuropathy at baseline. Pt does have mild dementia/parkinsons.  IV: PIV x2 SL  Resp/trach: RA O2 sats stable, Lungs clear.  Diet: Reg diet.   Bowel status: BS active. Pt felt constipated, senna given.  : Voiding  Skin: VEEG leads in place. Rash on legs/stomach (has seen derm in past for rash)  Pain:Foot pain/neuropathy. Gabapentin/tylenol/and topical cream for neuropathy given.  Activity: Up w/1 GB/Walker  Social: Wife at bedside  Plan: Continue to monitor and follow POC.

## 2019-09-07 NOTE — PROGRESS NOTES
09/07/19 0900   Quick Adds   Type of Visit Initial PT Evaluation  (Arielle Bunn, PT, DPT )       Present no   Language english    Living Environment   Lives With spouse   Living Arrangements house  (split level )   Home Accessibility stairs to enter home;stairs within home   Number of Stairs, Main Entrance 2   Stair Railings, Main Entrance railings on both sides of stairs   Number of Stairs, Within Home, Primary 7   Stair Railings, Within Home, Primary railing on right side (ascending)   Transportation Anticipated family or friend will provide;car, drives self  (Nils didnt drive much PTA )   Living Environment Comment Pt lives with SO in split level home. 2 CALEB with kilo handrail and 7 steps on split level. Pt drives short distances in town, otherwise wife does most of the driving.    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 Pt is retired from [employer] (retired on distability) SO is also is retired from [employer].    Functional Level Prior   Ambulation 1-->assistive equipment  (4W for long distance )   Transferring 0-->independent   Toileting 0-->independent   Bathing 1-->assistive equipment  (shower chair )   Communication 2-->difficulty understanding and speaking (not related to language barrier)   Swallowing 0-->swallows foods/liquids without difficulty   Cognition 1 - attention or memory deficits   Fall history within last six months yes   Number of times patient has fallen within last six months 6   Which of the above functional risks had a recent onset or change? fall history;cognition;transferring;ambulation   Prior Functional Level Comment Pt utilizes 4WW for community distances. has SPC and shower chair but  was not currently utilizing. SO reports that she knows of at lest 6 falls but pt likely has had many more.    General Information   Onset of Illness/Injury or Date of Surgery - Date 09/04/19   Referring Physician Sera Becker MD   Patient/Family Goals Statement improve mobility    Pertinent History of Current Problem (include personal factors and/or comorbidities that impact the POC) 63 year old male with a history of generalized seizure disorder, alcohol abuse, parkinsonism, hypertension, T2DM (diet-control) with diabetic neuropathy, HLD, ROSY, and depression admitted on 9/4/2019 with confusion after an episode of LOC. He is followed by multiple providers in the community, and has had poor f/u in the past. Pt has had large number of workups for extensive hx of possible autoimmune condition, frontotemporal dementia, and parkinsonisms.   Precautions/Limitations fall precautions;seizure precautions   General Info Comments activity: up ad candice    Cognitive Status Examination   Orientation orientation to person, place and time   Level of Consciousness alert   Follows Commands and Answers Questions 100% of the time   Personal Safety and Judgment impaired   Memory impaired   Pain Assessment   Patient Currently in Pain No   Integumentary/Edema   Integumentary/Edema no deficits were identifed   Posture    Posture Forward head position;Protracted shoulders   Range of Motion (ROM)   ROM Comment WFL throughout   Strength   Strength Comments minor deconditioning, grossly 4-5/5 kilo UE and LEs    Bed Mobility   Bed Mobility Comments HOB  minimally elevated, rolls to the R, supine>sit with SBA    Transfer Skills   Transfer Comments STS with minor use of UEs    Gait   Gait Comments ambulated withtou AD, has brace at home for L foot however not at hispital with him. inc toe flexion with ambualtion and slight toe out angle.    Balance   Balance Comments not formally assessed, pt is falls risk dt hx of falls    Sensory  "Examination   Sensory Perception no deficits were identified   Sensory Perception Comments does endorse N?T in kilo feet as PMH    Coordination   Coordination Comments L UE altered DANIEL    Muscle Tone   Muscle Tone no deficits were identified   General Therapy Interventions   Planned Therapy Interventions ADL retraining;balance training;bed mobility training;gait training;neuromuscular re-education;strengthening;transfer training;progressive activity/exercise;home program guidelines   Clinical Impression   Criteria for Skilled Therapeutic Intervention yes, treatment indicated   PT Diagnosis dec functional mobility    Influenced by the following impairments deconditioning, fatigue    Functional limitations due to impairments transfers, gait, stairs, activity tolerance, balance    Clinical Presentation Stable/Uncomplicated   Clinical Presentation Rationale PMH, clinical judgement, current mobility    Clinical Decision Making (Complexity) Low complexity   Therapy Frequency 5x/week   Predicted Duration of Therapy Intervention (days/wks) 1 week   Anticipated Equipment Needs at Discharge   (TBD)   Anticipated Discharge Disposition Home with Assist;Home with Outpatient Therapy   Risk & Benefits of therapy have been explained Yes   Patient, Family & other staff in agreement with plan of care Yes   Clinical Impression Comments PT eval completed, tx indicated    [facility] [employer] TM \"6 Clicks\"   2016, Trustees of [facility], under license to [employer].  All rights reserved.   6 Clicks Short Forms Basic Mobility Inpatient Short Form   [facility] AM-PAC  \"6 Clicks\" V.2 Basic Mobility Inpatient Short Form   1. Turning from your back to your side while in a flat bed without using bedrails? 4 - None   2. Moving from lying on your back to sitting on the side of a flat bed without using bedrails? 4 - None   3. Moving to and from a bed to a chair (including a wheelchair)? 4 - None   4. Standing up from a chair " using your arms (e.g., wheelchair, or bedside chair)? 3 - A Little   5. To walk in hospital room? 3 - A Little   6. Climbing 3-5 steps with a railing? 3 - A Little   Basic Mobility Raw Score (Score out of 24.Lower scores equate to lower levels of function) 21   Total Evaluation Time   Total Evaluation Time (Minutes) 7

## 2019-09-07 NOTE — PROGRESS NOTES
PATIENT SEEN AND EXAMINED BY ME on September 7, 2019 I AGREE WITH THE FINDINGS DETAILED BY THE NEUROLOGY RESIDENT and documented in their note on September 7, 2019   PLEASE REFER TO THEIR NOTE FOR ADDITIONAL DETAILS.       POPPY KIM MD  September 7, 2019

## 2019-09-07 NOTE — PLAN OF CARE
Status: Admitted for AMS and fainting spell. Hx of seizures. VEEG monitoring. Pt had one event, refer to notes. On CCM  Vitals: VSS, HTN within parameters.   Neuros: A/O x4. Intermittent confusion/forgetfulness. BLE 4/5. BUE 5/5. BLE neuropathy at baseline. Pt does have mild dementia/parkinsons.  IV: PIV x2 SL  Resp/trach: RA O2 sats stable, Lungs clear.  Diet: Reg diet.   Bowel status: BS active. c/o constipation, BM meds have been given. Small soft BM x1 this shift  : Voiding spont  Skin: VEEG leads in place. Rash on legs/stomach (has seen derm in past for rash)  Pain: Foot pain/neuropathy. Gabapentin, ropinirole, and ketoprofen cream for neuropathy given.  Activity: Up w/1 GB/Walker  Social: Wife at bedside  Plan: CIWA screening. Continue to monitor and follow POC.

## 2019-09-07 NOTE — SIGNIFICANT EVENT
0009 Event button pushed    Event button was pressed. Staff RN who attended to pt reported that pt was confused & sweaty. It lasted for a minute. Wife stated pt was irritable & restless at the time.       0130    Pt's blood sugar was 61. Apple juice provided & recheck was 135. Will continue to monitor

## 2019-09-07 NOTE — PLAN OF CARE
Discharge Planner PT   Patient plan for discharge: not discussed today   Current status: PT eval completed, tx indicated. Pt performed bed mobility and supine>sit with HOB minimally elevated and SBA. Completed STS with CGA-SBA. Pt ambulated ~200' without AD, CGA. To promote improvement in functional stability, pt completed dynamic balance drills, displayed difficulty with gait with EC and head turns. Education provided.    Barriers to return to prior living situation: medical needs, current mobility, stairs, fall risk   Recommendations for discharge: anticipate pt will be able to dc home with support from SO and OP PT for further progress of gait, activity tolerance, balance and strengthening.   Rationale for recommendations: See above       Entered by: Arielle Bunn 09/07/2019 10:46 AM

## 2019-09-07 NOTE — PROCEDURES
EEG #-1 (Day 1 of Video-EEG Monitoring)    DATE OF RECORDIN2019    DURATION OF RECORDIN hours, 19 minutes      CLINICAL SUMMARY:  This diagnostic video-EEG monitoring procedure was performed in evaluation of encephalopathy in Jose Loco.  He was reported to be receiving levetiracetam, gabapentin, levodopa-carbidopa, donepezil, paroxetine, ropinirole and methadone on this day of monitoring.      TECHNICAL SUMMARY:  This continuous EEG monitoring procedure was performed with 23 scalp electrodes in 10-20 system placements, and additional scalp, precordial and other surface electrodes used for electrical referencing and artifact detection.  Video monitoring was utilized and periodically reviewed by EEG technologists and the physician for electroclinical correlation.     INTERICTAL EEG ACTIVITIES:  During waking there was a 10 Hz posterior dominant rhythm.  During waking there was a frequent occurrence of generalized 4-8 Hz theta slowing of midline frontocentral maximum, although faster alpha-beta activities predominated bilaterally.  Drowsiness was manifested by predominance of centrally maximum semirhythmic theta slowing and dropout of the posterior dominant rhythm during deeper drowsiness.  Symmetric sleep spindles were seen during slow wave sleep.      No interictal epileptiform abnormalities were recorded.      ICTAL RECORDINGS:  No electrographic seizures and no paroxysmal behavioral events were recorded during this day of monitoring.      SUMMARY OF DAY 1 OF VIDEO-EEG MONITORING:    The interictal EEG recording during waking, drowsiness and slow wave sleep was abnormal due to generalized theta slowing during waking.    No interictal epileptiform abnormalities, no electrographic seizures, and no paroxysmal behavioral events were recorded on this day of monitoring.    These findings indicate mild generalized cerebral dysfunction.  Clinical correlation is recommended.   Amanuel Renee,  M.D., Professor of Neurology       D: 2019   T: 2019   MT: MONTEZ      Name:     LEYDI GRACE   MRN:      [id number]        Account:        [id number]   :      1955           Procedure Date: 2019      Document: [id number]

## 2019-09-07 NOTE — PLAN OF CARE
Discharge Planner OT   Patient plan for discharge: Home with assist.   Current status: Pt completed STS and toilet transfer using SBA and FWW. Completed g/h standing at sink using SBA. Assessed pt's functional cognition through path finding task. Independently donned shorts and tennis shoes. Pt able to locate elevators without assistance but required general and specific cues to locate bedroom and nutrition room. Demonstrated difficulty problem solving when directions provided by staff were vague but able to follow commands and recall what he was suppose to locate. VSS on RA.  Barriers to return to prior living situation: Medical status, cognition, functional endurance, and decreased ADL/IADL I.  Recommendations for discharge: TCU vs home with assist. Need to clarify with wife about pt's cognition at baseline and how much assistance she can provide at home.   Rationale for recommendations: Pt is progressing well and could be functionally I to return home with assist and S PRN for ADL/IADL tasks. This recommendation pending wife's responses to baseline cognition and assistance that can be given at home. May benefit from TCU 2/2 previously using a SPC at home and cognitive deficits pt is experiencing.        Entered by: Cecilia Carter 09/07/2019 3:10 PM     OT 6A

## 2019-09-08 ENCOUNTER — PATIENT OUTREACH (OUTPATIENT)
Dept: CARE COORDINATION | Facility: CLINIC | Age: 64
End: 2019-09-08

## 2019-09-08 ENCOUNTER — APPOINTMENT (OUTPATIENT)
Dept: CARDIOLOGY | Facility: CLINIC | Age: 64
DRG: 309 | End: 2019-09-08
Attending: INTERNAL MEDICINE
Payer: COMMERCIAL

## 2019-09-08 VITALS
BODY MASS INDEX: 33.34 KG/M2 | TEMPERATURE: 98.6 F | RESPIRATION RATE: 16 BRPM | WEIGHT: 220 LBS | HEART RATE: 45 BPM | HEIGHT: 68 IN | OXYGEN SATURATION: 98 % | DIASTOLIC BLOOD PRESSURE: 96 MMHG | SYSTOLIC BLOOD PRESSURE: 159 MMHG

## 2019-09-08 LAB
GLUCOSE BLDC GLUCOMTR-MCNC: 109 MG/DL (ref 70–99)
GLUCOSE BLDC GLUCOMTR-MCNC: 204 MG/DL (ref 70–99)
PLATELET # BLD AUTO: 207 10E9/L (ref 150–450)

## 2019-09-08 PROCEDURE — 0298T ZIO PATCH HOLTER ADULT PEDIATRIC GREATER THAN 48 HRS: CPT | Performed by: INTERNAL MEDICINE

## 2019-09-08 PROCEDURE — 25000128 H RX IP 250 OP 636: Performed by: STUDENT IN AN ORGANIZED HEALTH CARE EDUCATION/TRAINING PROGRAM

## 2019-09-08 PROCEDURE — 25000132 ZZH RX MED GY IP 250 OP 250 PS 637: Performed by: STUDENT IN AN ORGANIZED HEALTH CARE EDUCATION/TRAINING PROGRAM

## 2019-09-08 PROCEDURE — 00000146 ZZHCL STATISTIC GLUCOSE BY METER IP

## 2019-09-08 PROCEDURE — 85049 AUTOMATED PLATELET COUNT: CPT | Performed by: EMERGENCY MEDICINE

## 2019-09-08 PROCEDURE — 0296T ZIO PATCH HOLTER ADULT PEDIATRIC GREATER THAN 48 HRS: CPT

## 2019-09-08 PROCEDURE — 36415 COLL VENOUS BLD VENIPUNCTURE: CPT | Performed by: EMERGENCY MEDICINE

## 2019-09-08 RX ORDER — DONEPEZIL HYDROCHLORIDE 5 MG/1
5 TABLET, FILM COATED ORAL AT BEDTIME
Qty: 90 TABLET | Refills: 1 | Status: SHIPPED | OUTPATIENT
Start: 2019-09-08 | End: 2020-03-23

## 2019-09-08 RX ADMIN — ROPINIROLE HYDROCHLORIDE 0.5 MG: 0.25 TABLET, FILM COATED ORAL at 06:11

## 2019-09-08 RX ADMIN — Medication: at 08:13

## 2019-09-08 RX ADMIN — INSULIN ASPART 1 UNITS: 100 INJECTION, SOLUTION INTRAVENOUS; SUBCUTANEOUS at 09:05

## 2019-09-08 RX ADMIN — ALLOPURINOL 100 MG: 100 TABLET ORAL at 07:49

## 2019-09-08 RX ADMIN — Medication 100 MG: at 07:49

## 2019-09-08 RX ADMIN — Medication 400 MCG: at 07:49

## 2019-09-08 RX ADMIN — GABAPENTIN 800 MG: 800 TABLET, FILM COATED ORAL at 12:36

## 2019-09-08 RX ADMIN — LEVETIRACETAM 1500 MG: 750 TABLET, FILM COATED ORAL at 07:50

## 2019-09-08 RX ADMIN — ENOXAPARIN SODIUM 40 MG: 40 INJECTION SUBCUTANEOUS at 07:49

## 2019-09-08 RX ADMIN — MELATONIN 4000 UNITS: at 07:49

## 2019-09-08 RX ADMIN — GABAPENTIN 800 MG: 800 TABLET, FILM COATED ORAL at 06:03

## 2019-09-08 RX ADMIN — CARBIDOPA AND LEVODOPA 2 TABLET: 25; 100 TABLET ORAL at 07:50

## 2019-09-08 RX ADMIN — ROTIGOTINE 1 PATCH: 4 PATCH, EXTENDED RELEASE TRANSDERMAL at 07:48

## 2019-09-08 RX ADMIN — BACLOFEN 10 MG: 10 TABLET ORAL at 07:50

## 2019-09-08 RX ADMIN — ASPIRIN 81 MG: 81 TABLET, DELAYED RELEASE ORAL at 07:49

## 2019-09-08 ASSESSMENT — ACTIVITIES OF DAILY LIVING (ADL)
ADLS_ACUITY_SCORE: 18
ADLS_ACUITY_SCORE: 17

## 2019-09-08 NOTE — PLAN OF CARE
Physical Therapy Discharge Summary    Reason for therapy discharge:    Discharged to home with outpatient therapy.    Progress towards therapy goal(s). See goals on Care Plan in [facility] electronic health record for goal details.  Goals partially met.  Barriers to achieving goals:   discharge from facility.    Therapy recommendation(s):    Continued therapy is recommended.  Rationale/Recommendations:  for safe progression of functional mobility, falls risk and activity tolerance progression.

## 2019-09-08 NOTE — PLAN OF CARE
Occupational Therapy Discharge Summary    Reason for therapy discharge:    Discharged to home.    Progress towards therapy goal(s). See goals on Care Plan in [facility] electronic health record for goal details.  Goals partially met.  Barriers to achieving goals:   discharge from facility.    Therapy recommendation(s):    No further therapy is recommended. Non per OT - OP PT per PT to address pt functional mobility needs.

## 2019-09-08 NOTE — PLAN OF CARE
HR isauro otherwise VSS. Alert and oriented x 4. Neuros unchanged- BLE neuropathy which is baseline and generalized weakness. C/o generalized pain in legs d/t neuropathy. Rash continues on trunk and legs- Derm saw patient. Abrasion/wound noted on patient's left pinky toe. Patient stated he has had it for months and doesn't heal. Sore cleansed with microklenz. Asked MD for wound consult but patient discharging. Encouraged patient to see his primary MD afterward to have someone look at it. Up with assist of one/GB-slightly ataxic. Tolerating regular diet. Voiding spont. +BM.  Educated patient and spouse about discharge meds and instructions. PIV removed. Discharge to home with spouse.

## 2019-09-08 NOTE — PLAN OF CARE
"BP (!) 160/90 (BP Location: Left arm)   Pulse 50   Temp 97.3  F (36.3  C) (Oral)   Resp 18   Ht 1.727 m (5' 8\")   Wt 99.8 kg (220 lb)   SpO2 98%   BMI 33.45 kg/m    Status: Admitted for AMS and fainting spell. Hx of seizures. VEEG monitoring discontinued.  VS: VSS on RA (ex isauro)- CCM-NSR/Isauro  Neuro: A/Ox4. Intermittent confusion/forgetfulness. BLE 4/5. BUE 5/5. BLE neuropathy at baseline. Pt does have mild dementia/parkinsons.  Respiratory: Lung Sounds CTA, denies SOB  GI: Reg diet no BM.   : voids spontaneously without difficulty  IV: PIV x2 SL  Skin: Rash on legs/stomach (has seen derm in past for rash). Has skin reaction to CCM patches - use hypoallergenic patches  Pain: Foot pain/neuropathy. Gabapentin/tylenol/and topical cream for neuropathy given.  Activity: Up w/1 GB/Walker  Social:  Wife at bedside, supportive  See flow sheets for further details and assessments.  Plan of Care: continue to monitor, notify MD of significant changes.  "

## 2019-09-08 NOTE — PLAN OF CARE
Status: Admitted for AMS and fainting spell. Hx of seizures. VEEG monitoring discontinued.  Vitals: VSS, HTN within parameters. CCM-NSR/Yovanny  Neuros: A/O x4. Intermittent confusion/forgetfulness. BLE 4/5. BUE 5/5. BLE neuropathy at baseline. Pt does have mild dementia/parkinsons.  IV: PIV x2 SL  Resp/trach: RA O2 sats stable, Lungs clear.  Diet: Reg diet.   Bowel status: BS active. BM today.  : Voiding  Skin: Rash on legs/stomach (has seen derm in past for rash)  Pain:Foot pain/neuropathy. Gabapentin/tylenol/and topical cream for neuropathy given.  Activity: Up w/1 GB/Walker  Social: Wife at bedside  Plan: Continue to monitor and follow POC.

## 2019-09-08 NOTE — DISCHARGE SUMMARY
"[facility]  NEUROLOGY DISCHARGE SUMMARY  Patient Name: Jose Loco  MRN: [id number]  : 1955    Date of Admission:  2019  Date of Discharge:  2019  Admitting Physician:  Garry Ochoa MD  Discharge Physician:  Garry Ochoa  Primary Care Provider: Jacob Monson  Discharge Disposition: Discharged to home    Admission Diagnoses:  Spells of altered consciousness, concern for generalized epilepsy  Concern for dementia  Concern for Parkinson's Disease  Bradycardia  ROSY    Discharge Diagnoses:  Multiple spells captured, no EEG correlate, interictal slowing  Alcohol Abuse contributing to known cognitive deficits  Bradycardia with possible contribution to patient's altered awareness spells, will leave on zio patch and needs cardiology follow up  ROSY with intolerance to CPAP      Brief History of Illness:   Per HPI:  \"63 year old male who has a history of generalized seizure disorder, alcohol abuse, parkinsonism, hypertension, T2DM (diet-control) with diabetic neuropathy, HLD, ROSY, and depression, presented with confusion for 1 day.     He reports that he slipped and fell today at around 6 am. He feels nauseated and dizzy before that but denies chest pain or palpitation. The episode was unwitnessed but he states that he hit his head and blacked-out for around 20 minutes before his wife's brother found him. After that, patient developed word finding difficulty, somnolence, and confusion. According to his wife, he continues to be more confused compared to his baseline, patient's last known normal was around 5 AM this morning when they woke up. Denies weakness or numbness in his arms or legs. He has been drinking alcohol about 6 drinks/day. He also takes pain meds, including Norco daily. He denies smoking or recreational drug use.     Regarding seizures, he has been following by Neurology (last visit was 19). He has been mostly compliant with the " "levetiracetam 1500 bid. He has had more staring episodes 3-4 times a week. The last noticeable grand mal was around two years ago. He has had no change in motor function although he has increase tremor in his both hands.  Last EEG (11/5/18); Abnormal.  Generalized spikes and rare brief spike-wave bursts are noted. These findings indicate a generalized seizure tendency and would support a diagnosis of generalized epilepsy. Seizures were not recorded. Background and posterior dominant rhythm were within normal limits.     He was evaluated in the ED by neurology stroke team. Work-up including CMP, head CT and MRI were negative.\"    Hospital Course:  Patient was in ED with multiple episodes of altered awareness and intermittent left arm shaking concerning for seizures.  Patient received ativan x1.  He was subsequently admitted to the hospital and placed on EEG monitoring.  Multiple spells of altered awareness were captured (see final EEG report for further details) with no EEG correlate.  Importantly, interictal activity, while not normal, did not reveal epileptiform activity as it had on prior evaluations.    Of note, patient did have episodes of bradycardia during this admission, lowest in 30s, which was symptomatic.  Donepezil dose was decreased in response to this from 10mg to 5mg and patient was discharged with a cardiac monitor.      Patient is to follow up with Dr. Owens on the 13th of this month at which point AEDs can be discussed.  The patient was informed he has driving restrictions given his spells of altered awareness.  He was counseled on his chronic alcohol abuse.      Pertinent Investigations:    EEG final report pending    Results for orders placed or performed during the hospital encounter of 09/04/19   CT Head w/o Contrast    Narrative    CT HEAD W/O CONTRAST 9/4/2019 2:33 PM    Head CT without contrast  CT angiogram of the neck   CT angiogram of the base of the brain with contrast  Reconstruction " by the Radiologist on the 3D workstation    Provided History:  Mental status change  ICD-10:    Comparison:  None available.      Technique:  HEAD CT:  Using multidetector thin collimation helical acquisition  technique, axial, coronal and sagittal CT images from the skull base  to the vertex were obtained without intravenous contrast.   HEAD and NECK CTA: During rapid bolus intravenous injection of  nonionic contrast material, axial images were obtained using thin  collimation multidetector helical technique from the base of the skull  through the [place]. This CT angiogram data was reconstructed  at thin intervals with mild overlap. Images were sent to the [employer]  workstation, and 3D reconstructions were obtained. The axial source  images, multiplanar reformations, 3D reconstructions in both maximum  intensity projection display and volume rendered models were reviewed,  with reconstructions performed by the technologist and the  radiologist.    Contrast: Isovue-370, 75 mL IV    Findings:  Head CT: There is no intracranial hemorrhage, mass effect, or midline  shift. Gray/white matter differentiation in both cerebral hemispheres  is preserved. Ventricles are proportionate to the cerebral sulci.    Mild mucosal thickening in the paranasal sinuses.    Head CTA demonstrates no aneurysm or stenosis of the major  intracranial arteries. Vascular calcifications of the carotid siphons  without significant associated stenosis. The anterior communicating  arteries are patent. The posterior communicating arteries are not  visualized.    Neck CTA demonstrates no stenosis of the major cervical arteries.  Retropharyngeal course of the bilateral ICAs. The origins of the great  vessels from the aortic arch are patent. The normal distal right  internal carotid artery measures 4 mm. The normal distal left internal  carotid artery measures 4 mm.    No focal consolidation or mass in the visualized portion of the upper  lungs.  Mild calcification of the aortic arch. No thyroid nodules. No  cervical lymphadenopathy or mass. Advanced degenerative changes of the  cervical spine. There is reversal of the normal lordotic curvature.  Multilevel cervical spondylosis with loss of intervertebral disc  height, uncinate and facet hypertrophy. Associated spinal canal and  neural foraminal stenosis.      Impression    Impression:    1. Head CTA demonstrates no aneurysm or stenosis of the major  intracranial arteries.  2. Neck CTA demonstrates no stenosis of the major cervical arteries.  3. No intracranial hemorrhage on the noncontrast head CT.    I have personally reviewed the examination and initial interpretation  and I agree with the findings.    LULU RICHARD MD   CTA Head Neck with Contrast    Narrative    CT HEAD W/O CONTRAST 9/4/2019 2:33 PM    Head CT without contrast  CT angiogram of the neck   CT angiogram of the base of the brain with contrast  Reconstruction by the Radiologist on the 3D workstation    Provided History:  Mental status change  ICD-10:    Comparison:  None available.      Technique:  HEAD CT:  Using multidetector thin collimation helical acquisition  technique, axial, coronal and sagittal CT images from the skull base  to the vertex were obtained without intravenous contrast.   HEAD and NECK CTA: During rapid bolus intravenous injection of  nonionic contrast material, axial images were obtained using thin  collimation multidetector helical technique from the base of the skull  through the [place]. This CT angiogram data was reconstructed  at thin intervals with mild overlap. Images were sent to the [employer]  workstation, and 3D reconstructions were obtained. The axial source  images, multiplanar reformations, 3D reconstructions in both maximum  intensity projection display and volume rendered models were reviewed,  with reconstructions performed by the technologist and the  radiologist.    Contrast: Isovue-370, 75 mL  IV    Findings:  Head CT: There is no intracranial hemorrhage, mass effect, or midline  shift. Gray/white matter differentiation in both cerebral hemispheres  is preserved. Ventricles are proportionate to the cerebral sulci.    Mild mucosal thickening in the paranasal sinuses.    Head CTA demonstrates no aneurysm or stenosis of the major  intracranial arteries. Vascular calcifications of the carotid siphons  without significant associated stenosis. The anterior communicating  arteries are patent. The posterior communicating arteries are not  visualized.    Neck CTA demonstrates no stenosis of the major cervical arteries.  Retropharyngeal course of the bilateral ICAs. The origins of the great  vessels from the aortic arch are patent. The normal distal right  internal carotid artery measures 4 mm. The normal distal left internal  carotid artery measures 4 mm.    No focal consolidation or mass in the visualized portion of the upper  lungs. Mild calcification of the aortic arch. No thyroid nodules. No  cervical lymphadenopathy or mass. Advanced degenerative changes of the  cervical spine. There is reversal of the normal lordotic curvature.  Multilevel cervical spondylosis with loss of intervertebral disc  height, uncinate and facet hypertrophy. Associated spinal canal and  neural foraminal stenosis.      Impression    Impression:    1. Head CTA demonstrates no aneurysm or stenosis of the major  intracranial arteries.  2. Neck CTA demonstrates no stenosis of the major cervical arteries.  3. No intracranial hemorrhage on the noncontrast head CT.    I have personally reviewed the examination and initial interpretation  and I agree with the findings.    LULU RICHARD MD   MR Brain w/o & w Contrast    Narrative     MR BRAIN W/O & W CONTRAST 9/5/2019 12:16 AM    Provided History: Focal neuro deficit, < 6 hrs, stroke suspected; eval  stroke.    Comparison: Head CTA 9/4/2019    Technique: Multiplanar T1-weighted, axial  fat-saturated T2 FLAIR, and  axial susceptibility weighted images of the brain were obtained  without the administration of intravenous contrast. Additional  precontrast thin section coronal oblique T2-weighted and T2 FLAIR  images were obtained through the temporal lobes. After the  administration of intravenous contrast, axial diffusion weighted,  axial fat saturated T2 FLAIR, and coronal T1-weighted images of the  brain were obtained.    Contrast: 7.5 cc Gadavist    Findings:  There is no mass effect, midline shift, or  intracranial hemorrhage.  The ventricles are proportionate to the cerebral sulci. Scattered T2  FLAIR hyperintensities compatible with age-related small vessel  ischemic disease. Flow voids within the major intracranial vessels are  present.    Postcontrast images demonstrate no abnormal intracranial enhancement.  No abnormal signal or atrophy in the mesial temporal lobes  bilaterally..    No abnormality of the skull marrow signal. The major vascular  intracranial flow-voids are present. The orbits, visualized portions  of paranasal sinuses, and mastoid air cells are relatively clear.      Impression    Impression:  1. No acute intracranial pathology. No restricted diffusion to suggest  cerebral infarction.  2. No abnormal enhancement.   3. Mild Leukoaraiosis likely related to chronic small vessel ischemic  disease.    I have personally reviewed the examination and initial interpretation  and I agree with the findings.    SHARDA MARTÍNEZ MD   XR Shoulder Right 3 Views    Narrative    3 views right shoulder radiographs 9/5/2019 2:31 AM    History: fall, pain    Comparison: None available    Findings:    AP, Grashey, and Y  views of the right shoulder were obtained.     No displaced fracture. Glenohumeral and acromioclavicular joints are  congruent.    Mild degenerative changes of the acromioclavicular joint. No  substantial degenerative change of the glenohumeral joint.    Soft tissue is  "unremarkable. The visualized lung is clear.      Impression    Impression: No evidence of fracture or dislocation on the one axillary  view was not obtained.. Mild irregularity of the humeral head.    I have personally reviewed the examination and initial interpretation  and I agree with the findings.    CANDICE BORJA MD     Recent Labs   Lab Test 09/07/19  1018 09/05/19  0554 09/04/19  1403   WBC 8.2 7.7 9.7   HGB 13.0* 13.3 14.8   * 105* 104*    238 258      Recent Labs   Lab Test 09/07/19  1018 09/05/19  0554 09/04/19  1403    142 138   POTASSIUM 4.3 4.1 3.7   CHLORIDE 109 106 100   CO2 31 28 31   BUN 11 14 17   CR 0.79 0.75 0.72   ANIONGAP 3 7 7   GEMMA 9.1 8.9 9.4   * 114* 131*     Recent Labs   Lab Test 09/05/19  0554 09/05/19  0116   AST 27 24   ALT 37 38   ALKPHOS 75 74   BILITOTAL 0.3 0.3     Anticoagulation Dose History     Recent Dosing and Labs Latest Ref Rng & Units 9/4/2019    INR 0.86 - 1.14 0.91    INR-ISTAT 0.86 - 1.14 0.9        Recent Labs   Lab Test 09/05/19  0554 09/05/19  0116 09/04/19  1403   TROPI 0.067* 0.083* 0.055*     No lab results found.  No lab results found.  No lab results found.    Pending Investigations:  Labs/Studies pending at time of discharge: EEG final read    Consultations:  Cardiology curbside  OT  PT  EEG    Discharge Physical Examination:  Vitals: BP (!) 179/89 (BP Location: Left arm)   Pulse 50   Temp 97.3  F (36.3  C) (Oral)   Resp 16   Ht 1.727 m (5' 8\")   Wt 99.8 kg (220 lb)   SpO2 100%   BMI 33.45 kg/m    General: Adult male patient, lying in bed, NAD  HEENT: NC/AT, no icterus, op pink and moist  Chest: non-labored on RA  Extremities: Warm, LE edema  Skin: mild dermatitis changes on both shins   Psych: Mood pleasant, affect congruent  Neuro:  Mental status: Awake, alert, attentive, oriented to self, time, place, and circumstance. Language is slowed, slow comprehension of complex commands, naming intact  Cranial nerves: JAKE OLIVARES, " conjugate gaze, EOMI, facial sensation intact, face symmetric, hearing intact to conversation, shoulder shrug strong, tongue/uvula midline  Motor: Normal bulk and tone. No abnormal movements. No tremor observed. No pronator drift. 5/5 strength in 4/4 extremities.   Reflexes: deferred  Sensory: Decreased sensation to pinprick up to mid-calf in left leg. Intact sensation in RLE and bilateral upper extremities.   Coordination: FNF without ataxia or dysmetria. Heel shin on R with some difficulty,  normal on the left.   Gait: Deferred  Discharge Medications:  Current Discharge Medication List      CONTINUE these medications which have CHANGED    Details   donepezil (ARICEPT) 5 MG tablet Take 1 tablet (5 mg) by mouth At Bedtime  Qty: 90 tablet, Refills: 1    Associated Diagnoses: Memory loss         CONTINUE these medications which have NOT CHANGED    Details   gabapentin (NEURONTIN) 800 MG tablet 800mg tab by mouth 3/day @ 6am, 12noon, 11pm      HYDROcodone-acetaminophen (NORCO) 5-325 MG per tablet as needed      albuterol (PROAIR HFA/PROVENTIL HFA/VENTOLIN HFA) 108 (90 Base) MCG/ACT inhaler Inhale 1-2 puffs into the lungs every 4 hours as needed 1-2 puffs every 4 hours prn.      allopurinol (ZYLOPRIM) 100 MG tablet 100mg tab by mouth 3/day      amoxicillin (AMOXIL) 500 MG tablet ONLY FOR DENTAL APPOINTMENT: Reported on 3/21/2017      aspirin 81 MG tablet Take by mouth daily      baclofen (LIORESAL) 10 MG tablet 10mg tab by mouth twice daily @ 6am and 10pm  Refills: 2      !! carbidopa-levodopa (SINEMET)  MG tablet TAKE TWO TABLETS BY MOUTH THREE TIMES A DAY  Qty: 180 tablet, Refills: 5    Associated Diagnoses: Parkinsonism, unspecified Parkinsonism type (H)      !! carbidopa-levodopa (SINEMET)  MG tablet 2 tablets by mouth three times daily @ 6am, 12noon, and 11pm  Qty: 540 tablet, Refills: 5    Associated Diagnoses: Parkinsonism, unspecified Parkinsonism type (H)      cholecalciferol ( VITAMIN D3) 2000  units CAPS 2 x 2000 unit capsule by mouth daily @ 6am (may switch to evening)  Qty: 100 capsule, Refills: 2    Associated Diagnoses: Vitamin D deficiency      clotrimazole (LOTRIMIN) 1 % external cream Apply topically daily as needed (feet) Reported on 3/21/2017      COMPRESSION STOCKINGS 1 each      cyanocobalamin (VITAMIN B12) 1000 MCG/ML injection Inject 1 mL into the muscle every 30 days      !! diphenhydrAMINE (BENADRYL) 2 % external cream Apply topically as needed Reported on 3/21/2017      !! diphenhydrAMINE-zinc acetate (BENADRYL EXTRA STRENGTH) 2-0.1 % external cream       ferrous fumarate 65 mg, [place]. FE,-Vitamin C 125 mg (VITRON-C)  MG TABS tablet 1 tab by mouth twice daily @ noon and 6pm      fluticasone (FLONASE) 50 MCG/ACT nasal spray As needed      furosemide (LASIX) 40 MG tablet TAKE TWO TABLETS BY MOUTH IN THE MORNING AND TAKE ONE TABLET BY MOUTH IN THE AFTERNOON  Refills: 3      levETIRAcetam (KEPPRA) 500 MG tablet TAKE 3 PILLS IN THE MORNING, 3 PILLS AT BEDTIME  Qty: 186 tablet, Refills: 11    Associated Diagnoses: Cryptogenic generalized epilepsy (H)      lidocaine (LIDODERM) 5 % patch Place 1 patch onto the skin daily as needed for moderate pain Reported on 3/21/2017      methadone (DOLOPHINE) 5 MG tablet 1-2 x 5mg tab by mouth nightly  Qty: 1 tablet, Refills: 0      Naftifine (NAFTIN) 2 % GEL Apply topically daily Reported on 3/21/2017      NEUPRO 3 MG/24HR PT24 3mg patch apply daily  Qty: 90 patch, Refills: 3      ondansetron (ZOFRAN) 4 MG tablet Take by mouth as needed for nausea or vomiting Reported on 3/21/2017      other medical supplies Cane (device) For home use. X 12 weeks.      PARoxetine (PAXIL) 40 MG tablet Take 1 tablet (40 mg) by mouth At Bedtime  Refills: 1      potassium chloride ER (K-DUR/KLOR-CON M) 20 MEQ CR tablet 20meq tab by mouth daily @ noon      rOPINIRole (REQUIP) 1 MG tablet 1mg tab by mouth 4/day @ 6am, 12noon, 6pm, and 10-11pm  Qty: 360 tablet, Refills: 0     "  simvastatin (ZOCOR) 40 MG tablet 40mg tab by mouth at bedtime      traMADol (ULTRAM) 50 MG tablet Not clear if taking 50mg tab by mouth 3/day  Qty: 1 tablet, Refills: 0      traZODone (DESYREL) 50 MG tablet Not clear if taking trazodone at all. Was listed as 3/day      triamcinolone (KENALOG) 0.5 % cream Apply topically 3 times daily Reported on 3/21/2017      !! UNABLE TO FIND nonformulary  P7 - CROW/CLON/GEORGETTE/IMIP/LIDO/PENT - 10/0.2/6/3/5/3% Apply 1-2 gms to affected area 3-4x per day. Rub in for 1-2 minutes.      !! UNABLE TO FIND Diabetic shoes DX: Diabetic Neuropathy      !! UNABLE TO FIND Syringe w/ Needle, Disposable - 3 ML (syringe) 22 x 1\" As directed.       !! - Potential duplicate medications found. Please discuss with provider.          Discharge follow-up and instructions:     Physical Therapy Referral      Occupational Therapy Referral      Reason for your hospital stay    You were admitted out of concern for seizures, no such seizures were identified.     Follow Up and recommended labs and tests    Follow up with primary care provider, Jacob Monson, within 7 days to evaluate medication change.  The following labs/tests are recommended: EKG.     Activity    Your activity upon discharge: activity as tolerated     Diet    Follow this diet upon discharge: Orders Placed This Encounter      Combination Diet Regular Diet Adult       Patient seen and discussed with Dr. Gabriela Stephens MD  Neurology PGY3  [facility]    This note was written with the assistance of the Dragon voice-dictation technology software. The final document, although reviewed, may contain errors. For corrections, please contact the office.    Discharge planning was less than 30 minutes today  PATIENT SEEN AND EXAMINED BY ME on September 8, 2019 I AGREE WITH THE FINDINGS DETAILED BY THE NEUROLOGY RESIDENT and documented in their note on September 8, 2019   PLEASE REFER TO THEIR NOTE FOR ADDITIONAL DETAILS. "       POPPY KIM MD  September 8, 2019

## 2019-09-08 NOTE — DISCHARGE SUMMARY
Discharge planning was less than 30 minutes today  PATIENT SEEN AND EXAMINED BY ME on September 8, 2019 I AGREE WITH THE FINDINGS DETAILED BY THE NEUROLOGY RESIDENT and documented in their note on September 8, 2019   PLEASE REFER TO THEIR NOTE FOR ADDITIONAL DETAILS.       POPPY KIM MD  September 8, 2019

## 2019-09-09 NOTE — PROGRESS NOTES
[facility]: Post-Discharge Note  SITUATION                                                      Admission:    Admission Date: 09/04/19   Reason for Admission: Spells of altered consciousness, concern for generalized epilepsy  Discharge:   Discharge Date: 09/08/19  Discharge Diagnosis: Spells of altered consciousness, concern for generalized epilepsy  Discharge Service: Neurology     BACKGROUND                                                      Jose Loco is a 63 year old male who has a history of generalized seizure disorder, alcohol abuse, parkinsonism, hypertension, T2DM (diet-control) with diabetic neuropathy, HLD, ROSY, and depression, presented with confusion for 1 day.     He reports that he slipped and fell today at around 6 am. He feels nauseated and dizzy before that but denies chest pain or palpitation. The episode was unwitnessed but he states that he hit his head and blacked-out for around 20 minutes before his wife's brother found him. After that, patient developed word finding difficulty, somnolence, and confusion. According to his wife, he continues to be more confused compared to his baseline, patient's last known normal was around 5 AM this morning when they woke up. Denies weakness or numbness in his arms or legs. He has been drinking alcohol about 6 drinks/day. He also takes pain meds, including Norco daily. He denies smoking or recreational drug use.     Regarding seizures, he has been following by Neurology (last visit was 8/16/19). He has been mostly compliant with the levetiracetam 1500 bid. He has had more staring episodes 3-4 times a week. The last noticeable grand mal was around two years ago. He has had no change in motor function although he has increase tremor in his both hands.  Last EEG (11/5/18); Abnormal.  Generalized spikes and rare brief spike-wave bursts are noted. These findings indicate a generalized seizure tendency and would support a diagnosis  of generalized epilepsy. Seizures were not recorded. Background and posterior dominant rhythm were within normal limits.     He was evaluated in the ED by neurology stroke team. Work-up including CMP, head CT and MRI were negative.    ASSESSMENT      Discharge Assessment  Patient reports symptoms are: Unchanged  Does the patient have all of their medications?: Yes  Does patient know what their new medications are for?: Not applicable  Does patient have a follow-up appointment scheduled?: Yes  Does patient have any other questions or concerns?: No    Post-op  Did the patient have surgery or a procedure: No  Fever: No  Chills: No  Eating & Drinking: eating and drinking without complaints/concerns(Patient reports he isn't that hungry. )  PO Intake: regular diet  Bowel Function: loose stools  Date of last BM: 09/09/19  Urinary Status: voiding without complaint/concerns    PLAN                                                      Outpatient Plan:      Follow up with primary care provider, Jacob Monson, within 7 days to evaluate medication change.  The following labs/tests are recommended: EKG.    Future Appointments   Date Time Provider Department Center   9/13/2019  2:45 PM Sandeep Owens MD [facility]   10/2/2019  2:00 PM Jed Irving MD [facility]   3/20/2020  9:15 AM Jae Barragan MD [facility] Owned     Alycia James [facility]

## 2019-09-10 NOTE — PROCEDURES
EEG #-3 (Day 3 of Video-EEG Monitoring)    DATE OF RECORDIN2019    DURATION OF RECORDIN hours, 7 minutes.    CLINICAL SUMMARY:  This video-EEG monitoring procedure was performed in evaluation of encephalopathy in Jose Loco.  He was reported to be receiving levetiracetam, gabapentin, levodopa-carbidopa, donepezil, paroxetine, ropinirole and methadone on this day of monitoring.      TECHNICAL SUMMARY:  This continuous EEG monitoring procedure was performed with 23 scalp electrodes in 10-20 system placements, and additional scalp, precordial and other surface electrodes used for electrical referencing and artifact detection.  Video monitoring was utilized and periodically reviewed by EEG technologists and the physician for electroclinical correlation.     INTERICTAL EEG ACTIVITIES:  During waking there was a 10 Hz posterior dominant rhythm.  During waking there was a frequent occurrence of generalized 4-8 Hz theta slowing of midline frontocentral maximum, although faster alpha-beta activities predominated bilaterally.  Drowsiness was manifested by predominance of centrally maximum semirhythmic theta slowing and dropout of the posterior dominant rhythm during deeper drowsiness.  Symmetric sleep spindles were seen during slow wave sleep.      During drowsiness, there were occasional single high-amplitude generalized spike-wave complexes of symmetric frontal maximum.      ICTAL RECORDINGS:  No electrographic seizures and no paroxysmal behavioral events were recorded during this day of monitoring.      SUMMARY OF DAY 3 OF VIDEO-EEG MONITORING:    The interictal EEG recording was abnormal due to generalized theta slowing during waking, with frequent single generalized spike-wave complexes during drowsiness.    No electrographic seizures and no paroxysmal behavioral events were recorded on this day of monitoring.     SUMMARY OF 3 DAYS OF VIDEO-EEG MONITORING:    Over the 3 days of monitoring,  the EEG recording was persistently abnormal due to generalized theta slowing during waking, with occasional, isolated generalized spike-wave complexes during drowsiness.    No electrographic seizures and no paroxysmal behavioral events were recorded during the 3 days of monitoring.    These findings indicate mild generalized cerebral dysfunction and are consistent with the interictal state of generalized epilepsy.  Clinical correlation is recommended.  Amanuel Renee M.D., Professor of Neurology      D: 2019   T: 2019   MT: MONTEZ      Name:     LEYDI GRACE   MRN:      [id number]        Account:        [id number]   :      1955           Procedure Date: 2019      Document: [id number]

## 2019-09-10 NOTE — PROCEDURES
EEG #-2 (Day 2 of Video-EEG Monitoring)    DATE OF RECORDIN2019    DURATION OF RECORDIN hours, 51 minutes      CLINICAL SUMMARY:  This video-EEG monitoring procedure was performed in evaluation of encephalopathy in Jose Loco.  He was reported to be receiving levetiracetam, gabapentin, levodopa-carbidopa, donepezil, paroxetine, ropinirole and methadone on this day of monitoring.      TECHNICAL SUMMARY:  This continuous EEG monitoring procedure was performed with 23 scalp electrodes in 10-20 system placements, and additional scalp, precordial and other surface electrodes used for electrical referencing and artifact detection.  Video monitoring was utilized and periodically reviewed by EEG technologists and the physician for electroclinical correlation.     INTERICTAL EEG ACTIVITIES:  During waking there was a 10 Hz posterior dominant rhythm.  During waking there was a frequent occurrence of generalized 4-8 Hz theta slowing of midline frontocentral maximum, although faster alpha-beta activities predominated bilaterally.  Drowsiness was manifested by predominance of centrally maximum semirhythmic theta slowing and dropout of the posterior dominant rhythm during deeper drowsiness.  Symmetric sleep spindles were seen during slow wave sleep.      During drowsiness, there were occasional single high-amplitude generalized spike-wave complexes of symmetric frontal maximum.      ICTAL RECORDINGS:  No electrographic seizures and no paroxysmal behavioral events were recorded during this day of monitoring.      SUMMARY OF DAY 2 OF VIDEO-EEG MONITORING:    The interictal EEG recording was abnormal due to generalized theta slowing during waking, with frequent single generalized spike-wave complexes during drowsiness.    No electrographic seizures and no paroxysmal behavioral events were recorded on this day of monitoring.   These findings indicate mild generalized cerebral dysfunction and are  consistent with the interictal state of generalized epilepsy.  Clinical correlation is recommended.   Amanuel Renee M.D., Professor of Neurology       D: 2019   T: 2019   MT: LORETO      Name:     LEYDI GRACE   MRN:      [id number]        Account:        [id number]   :      1955           Procedure Date: 2019      Document: [id number]

## 2019-09-12 LAB — METHYLMALONATE SERPL-SCNC: 0.25 UMOL/L (ref 0–0.4)

## 2019-09-13 ENCOUNTER — OFFICE VISIT (OUTPATIENT)
Dept: NEUROLOGY | Facility: CLINIC | Age: 64
End: 2019-09-13
Attending: PSYCHIATRY & NEUROLOGY
Payer: COMMERCIAL

## 2019-09-13 ENCOUNTER — TELEPHONE (OUTPATIENT)
Dept: NEUROLOGY | Facility: CLINIC | Age: 64
End: 2019-09-13

## 2019-09-13 VITALS
HEART RATE: 59 BPM | OXYGEN SATURATION: 96 % | RESPIRATION RATE: 15 BRPM | WEIGHT: 214 LBS | SYSTOLIC BLOOD PRESSURE: 149 MMHG | BODY MASS INDEX: 32.43 KG/M2 | DIASTOLIC BLOOD PRESSURE: 78 MMHG | HEIGHT: 68 IN

## 2019-09-13 DIAGNOSIS — F03.91 DEMENTIA WITH BEHAVIORAL DISTURBANCE, UNSPECIFIED DEMENTIA TYPE: ICD-10-CM

## 2019-09-13 LAB
AST SERPL W P-5'-P-CCNC: 22 U/L (ref 0–45)
VIT B12 SERPL-MCNC: 415 PG/ML (ref 193–986)

## 2019-09-13 RX ORDER — LEVETIRACETAM 500 MG/1
TABLET ORAL
Qty: 217 TABLET | Refills: 11 | Status: SHIPPED | OUTPATIENT
Start: 2019-09-13 | End: 2020-04-17

## 2019-09-13 ASSESSMENT — MIFFLIN-ST. JEOR: SCORE: 1735.2

## 2019-09-13 ASSESSMENT — PAIN SCALES - GENERAL: PAINLEVEL: NO PAIN (0)

## 2019-09-13 NOTE — PROGRESS NOTES
[facility] Epilepsy [facility] Progress Note      Patient:  Jose Loco  :  1955   Age:  63 year old   Today's Office Visit:  2019    Epilepsy Data:    Patient History  Primary Epileptologist/Provider: Sandeep Owens M.D.  Patient Status: Not yet controlled  Epilepsy Syndrome: Generalized Epilepsy unspecified     Tests/Surgery History  Last EEG: oct 2015  Last MRI:     Seizure Record  Current Visit Date: 19  Previous Visit Date: 10/05/18  Months since last visit: 11.27    Seizure Type 1: Unspecified Staring Spell  Description of Sz Type 1: stares, still, unresponsive  # of Type 1 Seizure since last visit: 22  Freq. Type 1 / Month: 1.95    Seizure Type 2: Unspecified Convulsion  Description of Sz Type 2: collapse with stiffening and amnesia  # of Type 2 Seizure since last visit: 0  Freq. Type 2 / Month: 0      History of Present Illness:     Unfortunately arrived for appointment approx 25 minutes late.    Last convulsive seizure was about two years ago. Staring episodes got better.  Today wife reports that he had intermittent jerks with vocalizations around time of his last convulsive seizure and previous seizures. She had not mentioned this to us previously. States that seizures do not occur any particular time of day and do not predominate shortly after awakening.    Had collapse with blackout on 2019 at about 6 AM.  He does not recall any warning.  Came to with blood on head. Was brought home. Recalls this part well.  When arrived, wife noted intermittent jerks with vocalizations. He is amnestic for this period of time.    Taken to ER when had period of confusion and intermittent jerks per wife.  Also some left sided jerks. Received lorazapam. Jerks stopped. Stayed in hospital for three days with gradual improvement. Video-EEG during this period of time showed intermittent epileptiform discharges. No seizures. Wife pressed button twice but states these were not target spells.  Wife adamantly denies that target spells occurred during video-EEG monitoring. This is in contrast to discharge summary; however personal review of pruned EEG files confirms wife's report.    Had been drinking beer six pack per day but doing this since 2011. Denies other obvious seizure precipitants. Taking medications as directed. No fevers, diarrhea, vomiting. No new medications. No significant sleep deprivation, street drug use. No new significant stressors.    Has been on gabapentin for 6 years for bad RLS. Lasts all day. Quite helpful.    In hospital bradycardia was noted; reportedly down to 35 or so at night. Aricept was reduced and monitor was placed. I cannot find levetiracetam level.     Current Outpatient Medications   Medication Sig Dispense Refill     albuterol (PROAIR HFA/PROVENTIL HFA/VENTOLIN HFA) 108 (90 Base) MCG/ACT inhaler Inhale 1-2 puffs into the lungs every 4 hours as needed 1-2 puffs every 4 hours prn.       allopurinol (ZYLOPRIM) 100 MG tablet 100mg tab by mouth 3/day       amoxicillin (AMOXIL) 500 MG tablet ONLY FOR DENTAL APPOINTMENT: Reported on 3/21/2017       aspirin 81 MG tablet Take by mouth daily       baclofen (LIORESAL) 10 MG tablet 10mg tab by mouth twice daily @ 6am and 10pm  2     carbidopa-levodopa (SINEMET)  MG tablet TAKE TWO TABLETS BY MOUTH THREE TIMES A  tablet 5     carbidopa-levodopa (SINEMET)  MG tablet 2 tablets by mouth three times daily @ 6am, 12noon, and 11pm 540 tablet 5     cholecalciferol (HM VITAMIN D3) 2000 units CAPS 2 x 2000 unit capsule by mouth daily @ 6am (may switch to evening) 100 capsule 2     clotrimazole (LOTRIMIN) 1 % external cream Apply topically daily as needed (feet) Reported on 3/21/2017       COMPRESSION STOCKINGS 1 each       cyanocobalamin (VITAMIN B12) 1000 MCG/ML injection Inject 1 mL into the muscle every 30 days       diphenhydrAMINE (BENADRYL) 2 % external cream Apply topically as needed Reported on 3/21/2017        diphenhydrAMINE-zinc acetate (BENADRYL EXTRA STRENGTH) 2-0.1 % external cream        donepezil (ARICEPT) 5 MG tablet Take 1 tablet (5 mg) by mouth At Bedtime 90 tablet 1     ferrous fumarate 65 mg, [place]. FE,-Vitamin C 125 mg (VITRON-C)  MG TABS tablet 1 tab by mouth twice daily @ noon and 6pm       fluticasone (FLONASE) 50 MCG/ACT nasal spray As needed       furosemide (LASIX) 40 MG tablet TAKE TWO TABLETS BY MOUTH IN THE MORNING AND TAKE ONE TABLET BY MOUTH IN THE AFTERNOON  3     gabapentin (NEURONTIN) 800 MG tablet 800mg tab by mouth 3/day @ 6am, 12noon, 11pm       HYDROcodone-acetaminophen (NORCO) 5-325 MG per tablet as needed       Ketoprofen 10 % CREA Externally apply 1 Tube topically 2 times daily 1 Tube 1     levETIRAcetam (KEPPRA) 500 MG tablet TAKE 3 PILLS IN THE MORNING, 3 PILLS AT BEDTIME 186 tablet 11     lidocaine (LIDODERM) 5 % patch Place 1 patch onto the skin daily as needed for moderate pain Reported on 3/21/2017       methadone (DOLOPHINE) 5 MG tablet 1-2 x 5mg tab by mouth nightly 1 tablet 0     Naftifine (NAFTIN) 2 % GEL Apply topically daily Reported on 3/21/2017       NEUPRO 3 MG/24HR PT24 3mg patch apply daily 90 patch 3     ondansetron (ZOFRAN) 4 MG tablet Take by mouth as needed for nausea or vomiting Reported on 3/21/2017       other medical supplies Cane (device) For home use. X 12 weeks.       PARoxetine (PAXIL) 40 MG tablet Take 1 tablet (40 mg) by mouth At Bedtime  1     potassium chloride ER (K-DUR/KLOR-CON M) 20 MEQ CR tablet 20meq tab by mouth daily @ noon       rOPINIRole (REQUIP) 1 MG tablet 1mg tab by mouth 4/day @ 6am, 12noon, 6pm, and 10-11pm 360 tablet 0     simvastatin (ZOCOR) 40 MG tablet 40mg tab by mouth at bedtime       traMADol (ULTRAM) 50 MG tablet Not clear if taking 50mg tab by mouth 3/day 1 tablet 0     traZODone (DESYREL) 50 MG tablet Not clear if taking trazodone at all. Was listed as 3/day       triamcinolone (KENALOG) 0.5 % cream Apply topically 3 times  "daily Reported on 3/21/2017       UNABLE TO FIND nonformulary  P7 - CROW/CLON/GEORGETTE/IMIP/LIDO/PENT - 10/0.2/6/3/5/3% Apply 1-2 gms to affected area 3-4x per day. Rub in for 1-2 minutes.       UNABLE TO FIND Diabetic shoes DX: Diabetic Neuropathy       UNABLE TO FIND Syringe w/ Needle, Disposable - 3 ML (syringe) 22 x 1\" As directed.          Medication Notes:  No side effects.      AED Medication Compliance:  compliant all of the time  Using a pill box:  Yes; wife oversees medications.      Have you experienced a traumatic fall since your last visit: YES  Are these falls related to your seizures: YES: no non seizure related falls.    Other Issues:  Per wife memory about the same. He does not wander or get lost but will sometimes leave oven burners on and forget that he did so. States that he has completely stopped drinking alcohol and will not drink alcohol again. Has stated that he will stop driving.    Is patient safe to drive:  No    Exam:    BP (!) 149/78   Pulse 59   Resp 15   Ht 1.727 m (5' 8\")   Wt 97.1 kg (214 lb)   SpO2 96%   BMI 32.54 kg/m       Wt Readings from Last 5 Encounters:   09/13/19 97.1 kg (214 lb)   09/04/19 99.8 kg (220 lb)   08/16/19 96.1 kg (211 lb 12.8 oz)   03/15/19 100.6 kg (221 lb 12.8 oz)   02/15/19 98.5 kg (217 lb 3.2 oz)     Normal base. Safe on own. Cannot stand on each leg alone. Romberg negative. VFF. EOMI with good smooth pursuit and mild nystagmus. Smile symmetrical. No drift, pronation or tremor. Proximal and distal strength full in arms and legs. DTR symmetrical in arms. Knee jerks symmetrical. Cannot elicit ankle jerks. For me tone is normal in arms and legs. FFN is done well. HKS is done well. Toe tap is clumsy bilaterally. No grasp reflexes.    Patient was half hour late and we did not have opportunity to perform MMSE today.     HR 50 to 60. Soft systolic murmur right superior sternum, does not radiate. No carotid bruits audible.     IMPRESSION  1) Generalized epilepsy; " likely absence and generalized tonic clonic seizures. This is based on interictal EEG and on apparent response to levetiracetam. Suspect he has had long term increased seizure tendency. I do not think seizures are related to cerebral degenerative syndrome or potential cerebral auto-immune disorder.  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 This is based mostly on history, especially wife description of what sound like myoclonic seizures following the collapse with amnesia and prolonged period of confusion afterward. Not clear if he was compliant because no levetiracetam level. Alcohol use may have played some role but chronic. Gabapentin may worsen seizures in people with primary generalized epilepsy but again has been taking this medication for years. Some information suggests donepezil can worsen seizures but again taking this medication for a long time.  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 Again, alcohol did not help. Did not have time for assessment today. Overall, appears stable cognitively and per neuro exam.     DISCUSSION  Discussed means of distinguishing absence seizures from daydreaming attacks. Asked to keep seizure diary. Discussed benefits of repeat EEG. Depending on results can consider adding ethosuximide or divalproex.     PLAN:  1) levetiracetam level today.  2) increase levetiracetam to 1500 mg in AM and 2000 mg in PM.  3) Follow up in two months. MMSE at that time.  4) Follow up ongoing cardiac results.  5) RTC 2 months. VPA or ESM may be appropriate medications to consider next for seizures.    Total time 33 minutes, more than half counselling and coordinating cares.    Sandeep Owens MD

## 2019-09-13 NOTE — TELEPHONE ENCOUNTER
A prior authorization is needed for the following medication prescribed.  Please complete a prior authorization with the information included below.    Medication: FV-Ketoprofen 10% PLO (compounded)  Ingredients: Ketoprofen Powd [address]-03, Salt stable Cream [address]-75  RX #: [id number]  Reason for Rejection: Porduct service not covered    Pharmacy Insurance plan: [employer]   BIN #: [id number]    ID #: [id number]  PCN #: [id number]  Phone #: [phone]      Pharmacy NPI:[id number]      Please advise the pharmacy when the prior authorization is approved or denied.     Thank you for your time.    [facility]  [phone]

## 2019-09-13 NOTE — LETTER
2019       RE: Jose Loco  [address] [facility] [address]     Dear Colleague,    Thank you for referring your patient, Jose Loco, to the [facility]UR[facility] at [facility]. Please see a copy of my visit note below.    [facility] Epilepsy Care Progress Note    Patient:  Jose Loco  :  1955   Age:  63 year old   Today's Office Visit:  2019    Epilepsy Data:    Patient History  Primary Epileptologist/Provider: Sandeep Owens M.D.  Patient Status: Not yet controlled  Epilepsy Syndrome: Generalized Epilepsy unspecified     Tests/Surgery History  Last EEG: oct 2015  Last MRI:     Seizure Record  Current Visit Date: 19  Previous Visit Date: 10/05/18  Months since last visit: 11.27    Seizure Type 1: Unspecified Staring Spell  Description of Sz Type 1: stares, still, unresponsive  # of Type 1 Seizure since last visit: 22  Freq. Type 1 / Month: 1.95    Seizure Type 2: Unspecified Convulsion  Description of Sz Type 2: collapse with stiffening and amnesia  # of Type 2 Seizure since last visit: 0  Freq. Type 2 / Month: 0      History of Present Illness:     Unfortunately arrived for appointment approx 25 minutes late.    Last convulsive seizure was about two years ago. Staring episodes got better.  Today wife reports that he had intermittent jerks with vocalizations around time of his last convulsive seizure and previous seizures. She had not mentioned this to us previously. States that seizures do not occur any particular time of day and do not predominate shortly after awakening.    Had collapse with blackout on 2019 at about 6 AM.  He does not recall any warning.  Came to with blood on head. Was brought home. Recalls this part well.  When arrived, wife noted intermittent jerks with vocalizations. He is amnestic for this period of time.    Taken to ER when had period of confusion and intermittent jerks per  wife.  Also some left sided jerks. Received lorazapam. Jerks stopped. Stayed in hospital for three days with gradual improvement. Video-EEG during this period of time showed intermittent epileptiform discharges. No seizures. Wife pressed button twice but states these were not target spells. Wife adamantly denies that target spells occurred during video-EEG monitoring. This is in contrast to discharge summary; however personal review of pruned EEG files confirms wife's report.    Had been drinking beer six pack per day but doing this since 2011. Denies other obvious seizure precipitants. Taking medications as directed. No fevers, diarrhea, vomiting. No new medications. No significant sleep deprivation, street drug use. No new significant stressors.    Has been on gabapentin for 6 years for bad RLS. Lasts all day. Quite helpful.    In hospital bradycardia was noted; reportedly down to 35 or so at night. Aricept was reduced and monitor was placed. I cannot find levetiracetam level.     Current Outpatient Medications   Medication Sig Dispense Refill     albuterol (PROAIR HFA/PROVENTIL HFA/VENTOLIN HFA) 108 (90 Base) MCG/ACT inhaler Inhale 1-2 puffs into the lungs every 4 hours as needed 1-2 puffs every 4 hours prn.       allopurinol (ZYLOPRIM) 100 MG tablet 100mg tab by mouth 3/day       amoxicillin (AMOXIL) 500 MG tablet ONLY FOR DENTAL APPOINTMENT: Reported on 3/21/2017       aspirin 81 MG tablet Take by mouth daily       baclofen (LIORESAL) 10 MG tablet 10mg tab by mouth twice daily @ 6am and 10pm  2     carbidopa-levodopa (SINEMET)  MG tablet TAKE TWO TABLETS BY MOUTH THREE TIMES A  tablet 5     carbidopa-levodopa (SINEMET)  MG tablet 2 tablets by mouth three times daily @ 6am, 12noon, and 11pm 540 tablet 5     cholecalciferol (HM VITAMIN D3) 2000 units CAPS 2 x 2000 unit capsule by mouth daily @ 6am (may switch to evening) 100 capsule 2     clotrimazole (LOTRIMIN) 1 % external cream Apply  topically daily as needed (feet) Reported on 3/21/2017       COMPRESSION STOCKINGS 1 each       cyanocobalamin (VITAMIN B12) 1000 MCG/ML injection Inject 1 mL into the muscle every 30 days       diphenhydrAMINE (BENADRYL) 2 % external cream Apply topically as needed Reported on 3/21/2017       diphenhydrAMINE-zinc acetate (BENADRYL EXTRA STRENGTH) 2-0.1 % external cream        donepezil (ARICEPT) 5 MG tablet Take 1 tablet (5 mg) by mouth At Bedtime 90 tablet 1     ferrous fumarate 65 mg, [place]ski. FE,-Vitamin C 125 mg (VITRON-C)  MG TABS tablet 1 tab by mouth twice daily @ noon and 6pm       fluticasone (FLONASE) 50 MCG/ACT nasal spray As needed       furosemide (LASIX) 40 MG tablet TAKE TWO TABLETS BY MOUTH IN THE MORNING AND TAKE ONE TABLET BY MOUTH IN THE AFTERNOON  3     gabapentin (NEURONTIN) 800 MG tablet 800mg tab by mouth 3/day @ 6am, 12noon, 11pm       HYDROcodone-acetaminophen (NORCO) 5-325 MG per tablet as needed       Ketoprofen 10 % CREA Externally apply 1 Tube topically 2 times daily 1 Tube 1     levETIRAcetam (KEPPRA) 500 MG tablet TAKE 3 PILLS IN THE MORNING, 3 PILLS AT BEDTIME 186 tablet 11     lidocaine (LIDODERM) 5 % patch Place 1 patch onto the skin daily as needed for moderate pain Reported on 3/21/2017       methadone (DOLOPHINE) 5 MG tablet 1-2 x 5mg tab by mouth nightly 1 tablet 0     Naftifine (NAFTIN) 2 % GEL Apply topically daily Reported on 3/21/2017       NEUPRO 3 MG/24HR PT24 3mg patch apply daily 90 patch 3     ondansetron (ZOFRAN) 4 MG tablet Take by mouth as needed for nausea or vomiting Reported on 3/21/2017       other medical supplies Cane (device) For home use. X 12 weeks.       PARoxetine (PAXIL) 40 MG tablet Take 1 tablet (40 mg) by mouth At Bedtime  1     potassium chloride ER (K-DUR/KLOR-CON M) 20 MEQ CR tablet 20meq tab by mouth daily @ noon       rOPINIRole (REQUIP) 1 MG tablet 1mg tab by mouth 4/day @ 6am, 12noon, 6pm, and 10-11pm 360 tablet 0     simvastatin (ZOCOR) 40  "MG tablet 40mg tab by mouth at bedtime       traMADol (ULTRAM) 50 MG tablet Not clear if taking 50mg tab by mouth 3/day 1 tablet 0     traZODone (DESYREL) 50 MG tablet Not clear if taking trazodone at all. Was listed as 3/day       triamcinolone (KENALOG) 0.5 % cream Apply topically 3 times daily Reported on 3/21/2017       UNABLE TO FIND nonformulary  P7 - CROW/CLON/GEORGETTE/IMIP/LIDO/PENT - 10/0.2/6/3/5/3% Apply 1-2 gms to affected area 3-4x per day. Rub in for 1-2 minutes.       UNABLE TO FIND Diabetic shoes DX: Diabetic Neuropathy       UNABLE TO FIND Syringe w/ Needle, Disposable - 3 ML (syringe) 22 x 1\" As directed.          Medication Notes:  No side effects.      AED Medication Compliance:  compliant all of the time  Using a pill box:  Yes; wife oversees medications.    Have you experienced a traumatic fall since your last visit: YES  Are these falls related to your seizures: YES: no non seizure related falls.    Other Issues:  Per wife memory about the same. He does not wander or get lost but will sometimes leave oven burners on and forget that he did so. States that he has completely stopped drinking alcohol and will not drink alcohol again. Has stated that he will stop driving.    Is patient safe to drive:  No    Exam:    BP (!) 149/78   Pulse 59   Resp 15   Ht 1.727 m (5' 8\")   Wt 97.1 kg (214 lb)   SpO2 96%   BMI 32.54 kg/m        Wt Readings from Last 5 Encounters:   09/13/19 97.1 kg (214 lb)   09/04/19 99.8 kg (220 lb)   08/16/19 96.1 kg (211 lb 12.8 oz)   03/15/19 100.6 kg (221 lb 12.8 oz)   02/15/19 98.5 kg (217 lb 3.2 oz)     Normal base. Safe on own. Cannot stand on each leg alone. Romberg negative. VFF. EOMI with good smooth pursuit and mild nystagmus. Smile symmetrical. No drift, pronation or tremor. Proximal and distal strength full in arms and legs. DTR symmetrical in arms. Knee jerks symmetrical. Cannot elicit ankle jerks. For me tone is normal in arms and legs. FFN is done well. HKS is done " well. Toe tap is clumsy bilaterally. No grasp reflexes.    Patient was half hour late and we did not have opportunity to perform MMSE today.     HR 50 to 60. Soft systolic murmur right superior sternum, does not radiate. No carotid bruits audible.     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 This is based on interictal EEG and on apparent response to levetiracetam. Suspect he has had long term increased seizure tendency. I do not think seizures are related to cerebral degenerative syndrome or potential cerebral auto-immune disorder.  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 This is based mostly on history, especially wife description of what sound like myoclonic seizures following the collapse with amnesia and prolonged period of confusion afterward. Not clear if he was compliant because no levetiracetam level. Alcohol use may have played some role but chronic. Gabapentin may worsen seizures in people with primary generalized epilepsy but again has been taking this medication for years. Some information suggests donepezil can worsen seizures but again taking this medication for a long time.  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 Again, alcohol did not help. Did not have time for assessment today. Overall, appears stable cognitively and per neuro exam.     DISCUSSION  Discussed means of distinguishing absence seizures from daydreaming attacks. Asked to keep seizure diary. Discussed benefits of repeat EEG. Depending on results can consider adding ethosuximide or divalproex.     PLAN:  1) levetiracetam level today.  2)  increase levetiracetam to 1500 mg in AM and 2000 mg in PM.  3) Follow up in two months. MMSE at that time.  4) Follow up ongoing cardiac results.  5) RTC 2 months. VPA or ESM may be appropriate medications to consider next for seizures.    Total time 33 minutes, more than half counselling and coordinating cares.    Sandeep Owens,  MD

## 2019-09-13 NOTE — PATIENT INSTRUCTIONS
Times of Days  AM  PM       Medication Tablet Size Number of Tablets/Capsules Total Daily Dosage    levetiracetam 500 mg 3    4      3500 mg                                                                                                                                   Increase levetiracetam as above.  Continue off the alcohol.    Carry this with you at all times.  CONTINUE TAKING YOUR OTHER MEDICATIONS AS PREVIOUSLY DIRECTED.      * * *Do not store medications in the bathroom.  Keep medications away from children!* * *

## 2019-09-13 NOTE — NURSING NOTE
Chief Complaint   Patient presents with     Seizures     [facility] RETURN SEIZURE- 6 MONTH F/U        Adilson Arauz, EMT

## 2019-09-17 LAB — LEVETIRACETAM SERPL-MCNC: 43 UG/ML (ref 12–46)

## 2019-09-18 NOTE — TELEPHONE ENCOUNTER
Central Prior Authorization Team   Phone: [phone]    Correct ID# [id number]    PA Initiation    Medication: FV-Ketoprofen 10% PLO (compounded)  Insurance Company: Other (see comments)  Pharmacy Filling the Rx: [facility] - [place], [place] - [address]  Filling Pharmacy Phone: [phone]  Filling Pharmacy Fax:    Start Date: 9/18/2019

## 2019-09-18 NOTE — TELEPHONE ENCOUNTER
Rec'd a call from Luanne with [employer] wanting to confirm additional info regarding P/A request for Compound medication.   Case # [id number].

## 2019-09-19 ENCOUNTER — DOCUMENTATION ONLY (OUTPATIENT)
Dept: CARE COORDINATION | Facility: CLINIC | Age: 64
End: 2019-09-19

## 2019-09-19 NOTE — TELEPHONE ENCOUNTER
PRIOR AUTHORIZATION DENIED    Medication: FV-Ketoprofen 10% PLO (compounded) - P/A DENIED    Denial Date: 9/19/2019    Denial Rational:         Appeal Information:

## 2019-10-02 ENCOUNTER — OFFICE VISIT (OUTPATIENT)
Dept: NEUROLOGY | Facility: CLINIC | Age: 64
End: 2019-10-02
Attending: PSYCHIATRY & NEUROLOGY
Payer: COMMERCIAL

## 2019-10-02 VITALS
WEIGHT: 223.5 LBS | OXYGEN SATURATION: 95 % | HEART RATE: 57 BPM | SYSTOLIC BLOOD PRESSURE: 129 MMHG | BODY MASS INDEX: 33.98 KG/M2 | DIASTOLIC BLOOD PRESSURE: 68 MMHG

## 2019-10-02 DIAGNOSIS — E43 EDEMA DUE TO MALNUTRITION, DUE TO UNSPECIFIED MALNUTRITION TYPE (H): ICD-10-CM

## 2019-10-02 DIAGNOSIS — G62.2 POLYNEUROPATHY DUE TO OTHER TOXIC AGENTS (H): ICD-10-CM

## 2019-10-02 DIAGNOSIS — G62.2 POLYNEUROPATHY DUE TO OTHER TOXIC AGENTS (H): Primary | ICD-10-CM

## 2019-10-02 RX ORDER — DULOXETIN HYDROCHLORIDE 30 MG/1
30 CAPSULE, DELAYED RELEASE ORAL DAILY
Qty: 14 CAPSULE | Refills: 0 | Status: SHIPPED | OUTPATIENT
Start: 2019-10-02 | End: 2020-11-24

## 2019-10-02 RX ORDER — DULOXETIN HYDROCHLORIDE 60 MG/1
60 CAPSULE, DELAYED RELEASE ORAL DAILY
Qty: 90 CAPSULE | Refills: 1 | Status: SHIPPED | OUTPATIENT
Start: 2019-10-02 | End: 2020-11-24

## 2019-10-02 ASSESSMENT — ENCOUNTER SYMPTOMS
DIZZINESS: 1
MEMORY LOSS: 1
WEAKNESS: 1
EYE WATERING: 1
DECREASED APPETITE: 0
MYALGIAS: 1
DOUBLE VISION: 0
STIFFNESS: 1
SPEECH CHANGE: 1
SMELL DISTURBANCE: 0
SYNCOPE: 1
FATIGUE: 1
ORTHOPNEA: 0
HYPOTENSION: 0
SORE THROAT: 0
NIGHT SWEATS: 0
HEARTBURN: 1
HEADACHES: 1
WEIGHT LOSS: 0
LEG PAIN: 1
DISTURBANCES IN COORDINATION: 1
NAUSEA: 1
BACK PAIN: 1
INCREASED ENERGY: 0
CHILLS: 0
HALLUCINATIONS: 0
HOARSE VOICE: 0
EXERCISE INTOLERANCE: 1
COUGH DISTURBING SLEEP: 0
NAIL CHANGES: 0
POLYPHAGIA: 0
EYE PAIN: 0
FEVER: 0
DEPRESSION: 1
JAUNDICE: 0
LIGHT-HEADEDNESS: 1
NECK MASS: 0
POLYDIPSIA: 0
RECTAL PAIN: 0
TASTE DISTURBANCE: 0
WHEEZING: 0
SPUTUM PRODUCTION: 1
SHORTNESS OF BREATH: 0
WEIGHT GAIN: 0
BLOATING: 1
COUGH: 1
TROUBLE SWALLOWING: 0
MUSCLE WEAKNESS: 1
JOINT SWELLING: 0
TINGLING: 1
POOR WOUND HEALING: 1
HEMOPTYSIS: 0
POSTURAL DYSPNEA: 0
TREMORS: 1
BOWEL INCONTINENCE: 0
BLOOD IN STOOL: 1
SKIN CHANGES: 0
ABDOMINAL PAIN: 1
HYPERTENSION: 1
SNORES LOUDLY: 1
EYE REDNESS: 1
PANIC: 0
SLEEP DISTURBANCES DUE TO BREATHING: 0
INSOMNIA: 1
MUSCLE CRAMPS: 1
SINUS CONGESTION: 0
LOSS OF CONSCIOUSNESS: 1
PARALYSIS: 0
SEIZURES: 1
ALTERED TEMPERATURE REGULATION: 1
SINUS PAIN: 0
DIARRHEA: 0
VOMITING: 0
CONSTIPATION: 1
DYSPNEA ON EXERTION: 0
ARTHRALGIAS: 1
NUMBNESS: 1
NERVOUS/ANXIOUS: 1
PALPITATIONS: 0
EYE IRRITATION: 1
DECREASED CONCENTRATION: 1
NECK PAIN: 1

## 2019-10-02 ASSESSMENT — PAIN SCALES - GENERAL: PAINLEVEL: EXTREME PAIN (8)
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
## 2019-10-02 NOTE — NURSING NOTE
Chief Complaint   Patient presents with     Consult     [facility] - NEUROPATHY       Aristides West, EMT
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
## 2019-10-02 NOTE — PATIENT INSTRUCTIONS
Increase gabapentin to 1200mg TID and assess for response.  Refill for ketoprofen sent to pharmacy  Titrate off of paxil, decrease by half for 2 weeks. Stop taking paxil.  Will start cymbalta at 30mg following titration off of Paxil. Then increase cymbalta to 60mg after two weeks  Start a trial of melatonin at 3-5mg at night.

## 2019-10-03 LAB
ALBUMIN SERPL ELPH-MCNC: 4.2 G/DL (ref 3.7–5.1)
ALPHA1 GLOB SERPL ELPH-MCNC: 0.3 G/DL (ref 0.2–0.4)
ALPHA2 GLOB SERPL ELPH-MCNC: 0.7 G/DL (ref 0.5–0.9)
B-GLOBULIN SERPL ELPH-MCNC: 0.7 G/DL (ref 0.6–1)
GAMMA GLOB SERPL ELPH-MCNC: 0.8 G/DL (ref 0.7–1.6)
M PROTEIN SERPL ELPH-MCNC: 0 G/DL
PROT PATTERN SERPL ELPH-IMP: NORMAL

## 2019-10-04 ENCOUNTER — HEALTH MAINTENANCE LETTER (OUTPATIENT)
Age: 64
End: 2019-10-04

## 2019-10-07 LAB
VIT B1 BLD-MCNC: 186 NMOL/L (ref 70–180)
VIT B6 SERPL-MCNC: 12.4 NMOL/L (ref 20–125)

## 2019-10-09 NOTE — PROGRESS NOTES
Neurology Attending Note:    63 years old with long standing peripheral neuropathy, likely secondary to ETOH use. We increase Gabapentin. May consider Lyrica in the future. Will Try duloxetine instead of paxil for depression.    I have seen and examined the patient with Dr. Irving.  I have reviewed the labs and imaging results available.  I agree with the assessment and plan.    Laure Baez MD        Answers for HPI/ROS submitted by the patient on 10/2/2019   General Symptoms: Yes  Skin Symptoms: Yes  HENT Symptoms: Yes  EYE SYMPTOMS: Yes  HEART SYMPTOMS: Yes  LUNG SYMPTOMS: Yes  INTESTINAL SYMPTOMS: Yes  URINARY SYMPTOMS: No  REPRODUCTIVE SYMPTOMS: No  SKELETAL SYMPTOMS: Yes  BLOOD SYMPTOMS: No  NERVOUS SYSTEM SYMPTOMS: Yes  MENTAL HEALTH SYMPTOMS: Yes  Fever: No  Loss of appetite: No  Weight loss: No  Weight gain: No  Fatigue: Yes  Night sweats: No  Chills: No  Increased stress: Yes  Excessive hunger: No  Excessive thirst: No  Feeling hot or cold when others believe the temperature is normal: Yes  Loss of height: No  Post-operative complications: No  Surgical site pain: No  Hallucinations: No  Change in or Loss of Energy: No  Hyperactivity: No  Confusion: Yes  Changes in hair: No  Changes in moles/birth marks: No  Itching: Yes  Rashes: Yes  Changes in nails: No  Acne: No  Change in facial hair: No  Warts: No  Non-healing sores: Yes  Scarring: Yes  Flaking of skin: Yes  Color changes of hands/feet in cold : No  Sun sensitivity: No  Skin thickening: No  Ear pain: No  Ear discharge: No  Hearing loss: Yes  Tinnitus: No  Nosebleeds: No  Congestion: No  Sinus pain: No  Trouble swallowing: No   Voice hoarseness: No  Mouth sores: No  Sore throat: No  Tooth pain: Yes  Gum tenderness: Yes  Bleeding gums: Yes  Change in taste: No  Change in sense of smell: No  Dry mouth: No  Hearing aid used: No  Neck lump: No  Eye pain: No  Vision loss: Yes  Dry eyes: Yes  Watery eyes: Yes  Eye bulging: No  Double vision: No  Flashing of  lights: No  Spots: No  Floaters: No  Redness: Yes  Crossed eyes: No  Tunnel Vision: No  Yellowing of eyes: No  Eye irritation: Yes  Cough: Yes  Sputum or phlegm: Yes  Coughing up blood: No  Difficulty breating or shortness of breath: No  Snoring: Yes  Wheezing: No  Difficulty breathing on exertion: No  Nighttime Cough: No  Difficulty breathing when lying flat: No  Chest pain or pressure: No  Fast or irregular heartbeat: No  Pain in legs with walking: Yes  Trouble breathing while lying down: No  Fingers or toes appear blue: No  High blood pressure: Yes  Low blood pressure: No  Fainting: Yes  Murmurs: No  Pacemaker: No  Varicose veins: No  Edema or swelling: Yes  Wake up at night with shortness of breath: No  Light-headedness: Yes  Exercise intolerance: Yes  Heart burn or indigestion: Yes  Nausea: Yes  Vomiting: No  Abdominal pain: Yes  Bloating: Yes  Constipation: Yes  Diarrhea: No  Blood in stool: Yes  Black stools: No  Rectal or Anal pain: No  Fecal incontinence: No  Yellowing of skin or eyes: No  Vomit with blood: No  Change in stools: Yes  Back pain: Yes  Muscle aches: Yes  Neck pain: Yes  Swollen joints: No  Joint pain: Yes  Bone pain: Yes  Muscle cramps: Yes  Muscle weakness: Yes  Joint stiffness: Yes  Bone fracture: No  Trouble with coordination: Yes  Dizziness or trouble with balance: Yes  Fainting or black-out spells: Yes  Memory loss: Yes  Headache: Yes  Seizures: Yes  Speech problems: Yes  Tingling: Yes  Tremor: Yes  Weakness: Yes  Difficulty walking: Yes  Paralysis: No  Numbness: Yes  Nervous or Anxious: Yes  Depression: Yes  Trouble sleeping: Yes  Trouble thinking or concentrating: Yes  Mood changes: Yes  Panic attacks: No

## 2019-11-15 ENCOUNTER — OFFICE VISIT (OUTPATIENT)
Dept: NEUROLOGY | Facility: CLINIC | Age: 64
End: 2019-11-15
Payer: COMMERCIAL

## 2019-11-15 VITALS
SYSTOLIC BLOOD PRESSURE: 150 MMHG | OXYGEN SATURATION: 94 % | DIASTOLIC BLOOD PRESSURE: 93 MMHG | HEIGHT: 68 IN | RESPIRATION RATE: 16 BRPM | HEART RATE: 65 BPM | WEIGHT: 219 LBS | BODY MASS INDEX: 33.19 KG/M2

## 2019-11-15 DIAGNOSIS — G40.309 GENERALIZED CONVULSIVE EPILEPSY (H): Primary | ICD-10-CM

## 2019-11-15 ASSESSMENT — ENCOUNTER SYMPTOMS
TREMORS: 1
SINUS PAIN: 0
EYE WATERING: 1
HOARSE VOICE: 0
BLOOD IN STOOL: 0
SPEECH CHANGE: 1
JOINT SWELLING: 1
TINGLING: 1
ORTHOPNEA: 0
NUMBNESS: 1
ABDOMINAL PAIN: 1
COUGH DISTURBING SLEEP: 0
WHEEZING: 0
SPUTUM PRODUCTION: 0
INSOMNIA: 1
HYPOTENSION: 0
DECREASED CONCENTRATION: 1
PARALYSIS: 0
COUGH: 0
SEIZURES: 0
MUSCLE CRAMPS: 1
BACK PAIN: 1
POSTURAL DYSPNEA: 0
SYNCOPE: 0
PALPITATIONS: 0
SINUS CONGESTION: 1
SORE THROAT: 0
TASTE DISTURBANCE: 0
SMELL DISTURBANCE: 0
DOUBLE VISION: 0
HEMOPTYSIS: 0
HEADACHES: 0
VOMITING: 0
NECK MASS: 0
DEPRESSION: 1
SHORTNESS OF BREATH: 0
EYE IRRITATION: 1
DIARRHEA: 0
STIFFNESS: 1
EXERCISE INTOLERANCE: 1
LIGHT-HEADEDNESS: 1
HYPERTENSION: 1
MUSCLE WEAKNESS: 1
EYE PAIN: 0
HEARTBURN: 0
BLOATING: 1
SLEEP DISTURBANCES DUE TO BREATHING: 0
PANIC: 0
ARTHRALGIAS: 1
NAUSEA: 1
LOSS OF CONSCIOUSNESS: 0
DIZZINESS: 1
WEAKNESS: 1
RECTAL PAIN: 0
TROUBLE SWALLOWING: 0
CONSTIPATION: 1
EYE REDNESS: 1
DYSPNEA ON EXERTION: 1
MYALGIAS: 1
LEG PAIN: 1
MEMORY LOSS: 1
JAUNDICE: 0
NERVOUS/ANXIOUS: 1
DISTURBANCES IN COORDINATION: 1
BOWEL INCONTINENCE: 1
NECK PAIN: 1
SNORES LOUDLY: 1

## 2019-11-15 ASSESSMENT — PAIN SCALES - GENERAL: PAINLEVEL: EXTREME PAIN (8)

## 2019-11-15 ASSESSMENT — MIFFLIN-ST. JEOR: SCORE: 1757.88

## 2019-11-15 NOTE — LETTER
11/15/2019       RE: Jose Loco  [address] [facility] [id number]     Dear Colleague,    Thank you for referring your patient, Jose Loco, to the [facility] NEUROLOGY at [facility]. Please see a copy of my visit note below.    [facility] Progress Note      Patient:  Jose Loco  :  1955   Age:  63 year old   Today's Office Visit:  11/15/2019    Epilepsy Data:    Patient History  Primary Epileptologist/Provider: Sandeep Owens M.D.  Patient Status: Not yet controlled  Epilepsy Syndrome: Generalized Epilepsy unspecified     Tests/Surgery History  Last EEG: oct 2015  Last MRI:     Seizure Record  Current Visit Date: 11/15/19  Previous Visit Date: 19  Months since last visit: 2.07    Seizure Type 1: Unspecified Staring Spell  Description of Sz Type 1: stares, still, unresponsive  # of Type 1 Seizure since last visit: 0  Freq. Type 1 / Month: 0    Seizure Type 2: Unspecified Convulsion  Description of Sz Type 2: collapse with stiffening and amnesia  # of Type 2 Seizure since last visit: 0  Freq. Type 2 / Month: 0    History of Present Illness:     No seizures. No staring spells.  Last GTC was in September.  He has stopped drinking altogether. Going to .    Current Outpatient Medications   Medication Sig Dispense Refill     albuterol (PROAIR HFA/PROVENTIL HFA/VENTOLIN HFA) 108 (90 Base) MCG/ACT inhaler Inhale 1-2 puffs into the lungs every 4 hours as needed 1-2 puffs every 4 hours prn.       allopurinol (ZYLOPRIM) 100 MG tablet 100mg tab by mouth 3/day       amoxicillin (AMOXIL) 500 MG tablet ONLY FOR DENTAL APPOINTMENT: Reported on 3/21/2017       aspirin 81 MG tablet Take by mouth daily       baclofen (LIORESAL) 10 MG tablet 10mg tab by mouth twice daily @ 6am and 10pm  2     carbidopa-levodopa (SINEMET)  MG tablet TAKE TWO TABLETS BY MOUTH THREE TIMES A  tablet 5     carbidopa-levodopa (SINEMET)   MG tablet 2 tablets by mouth three times daily @ 6am, 12noon, and 11pm 540 tablet 5     cholecalciferol (HM VITAMIN D3) 2000 units CAPS 2 x 2000 unit capsule by mouth daily @ 6am (may switch to evening) 100 capsule 2     clotrimazole (LOTRIMIN) 1 % external cream Apply topically daily as needed (feet) Reported on 3/21/2017       COMPRESSION STOCKINGS 1 each       cyanocobalamin (VITAMIN B12) 1000 MCG/ML injection Inject 1 mL into the muscle every 30 days       diphenhydrAMINE (BENADRYL) 2 % external cream Apply topically as needed Reported on 3/21/2017       diphenhydrAMINE-zinc acetate (BENADRYL EXTRA STRENGTH) 2-0.1 % external cream        donepezil (ARICEPT) 5 MG tablet Take 1 tablet (5 mg) by mouth At Bedtime 90 tablet 1     DULoxetine (CYMBALTA) 30 MG capsule Take 1 capsule (30 mg) by mouth daily 14 capsule 0     DULoxetine (CYMBALTA) 60 MG capsule Take 1 capsule (60 mg) by mouth daily 90 capsule 1     ferrous fumarate 65 mg, [place]. FE,-Vitamin C 125 mg (VITRON-C)  MG TABS tablet 1 tab by mouth twice daily @ noon and 6pm       fluticasone (FLONASE) 50 MCG/ACT nasal spray As needed       furosemide (LASIX) 40 MG tablet TAKE TWO TABLETS BY MOUTH IN THE MORNING AND TAKE ONE TABLET BY MOUTH IN THE AFTERNOON  3     gabapentin (NEURONTIN) 800 MG tablet 800mg tab by mouth 3/day @ 6am, 12noon, 11pm       HYDROcodone-acetaminophen (NORCO) 5-325 MG per tablet as needed       Ketoprofen 10 % CREA Externally apply 1 Tube topically 2 times daily 1 Tube 1     levETIRAcetam (KEPPRA) 500 MG tablet Take three in AM and four in PM (total 3500 mg) 217 tablet 11     lidocaine (LIDODERM) 5 % patch Place 1 patch onto the skin daily as needed for moderate pain Reported on 3/21/2017       methadone (DOLOPHINE) 5 MG tablet 1-2 x 5mg tab by mouth nightly 1 tablet 0     Naftifine (NAFTIN) 2 % GEL Apply topically daily Reported on 3/21/2017       NEUPRO 3 MG/24HR PT24 3mg patch apply daily 90 patch 3     ondansetron (ZOFRAN) 4  "MG tablet Take by mouth as needed for nausea or vomiting Reported on 3/21/2017       other medical supplies Cane (device) For home use. X 12 weeks.       PARoxetine (PAXIL) 40 MG tablet Take 1 tablet (40 mg) by mouth At Bedtime  1     potassium chloride ER (K-DUR/KLOR-CON M) 20 MEQ CR tablet 20meq tab by mouth daily @ noon       rOPINIRole (REQUIP) 1 MG tablet 1mg tab by mouth 4/day @ 6am, 12noon, 6pm, and 10-11pm 360 tablet 0     simvastatin (ZOCOR) 40 MG tablet 40mg tab by mouth at bedtime       traMADol (ULTRAM) 50 MG tablet Not clear if taking 50mg tab by mouth 3/day 1 tablet 0     traZODone (DESYREL) 50 MG tablet Not clear if taking trazodone at all. Was listed as 3/day       triamcinolone (KENALOG) 0.5 % cream Apply topically 3 times daily Reported on 3/21/2017       UNABLE TO FIND nonformulary  P7 - CROW/CLON/GEORGETTE/IMIP/LIDO/PENT - 10/0.2/6/3/5/3% Apply 1-2 gms to affected area 3-4x per day. Rub in for 1-2 minutes.       UNABLE TO FIND Diabetic shoes DX: Diabetic Neuropathy       UNABLE TO FIND Syringe w/ Needle, Disposable - 3 ML (syringe) 22 x 1\" As directed.          Medication Notes:    Taking GP helps with pain and restless legs.   Ketoprofen gel helped with peripheral neuropathy pain and was helpful but compound medication and insurance will not pain.   AED Medication Compliance:  compliant all of the time  Using a pill box:  Yes    Review of Systems:  Denies headaches. Denies loss of vision. Denies double vision. Denies dysphagia. Denies cough, wheezing, hemoptysis. Denies chest pain, leg swelling. Denies significant weight change, abdominal pain, nausea, vomiting, change in bowel habits, blood per rectum. Denies dysuria, hematuria, flank pain, or kidney stones.  Have you experienced a traumatic fall since your last visit: NO  Are these falls related to your seizures: NO    Other Issues:    Continues irritable. No violence. Wife feels memory is about the same.   Is patient safe to drive:  Not at present. " "Had been driving in [place] before the seizure. Only driving locally at the time.    Exam:    BP (!) 150/93   Pulse 65   Resp 16   Ht 5' 8\" (172.7 cm)   Wt 219 lb (99.3 kg)   SpO2 94%   BMI 33.30 kg/m        Wt Readings from Last 5 Encounters:   11/15/19 219 lb (99.3 kg)   10/02/19 223 lb 8 oz (101.4 kg)   09/13/19 214 lb (97.1 kg)   09/04/19 220 lb (99.8 kg)   08/16/19 211 lb 12.8 oz (96.1 kg)     MMSE today = 27. Had difficulty with spelling \"world\" backward. Interestingly recalled all three memory items after standard distraction. Pentagons clumsy but met standard and clock was done well. Normal base. Safe on own. Cannot stand on each leg alone. Romberg negative. VFF. EOMI with good smooth pursuit and  no nystagmus  today. Smile symmetrical. No drift, pronation or tremor. Proximal and distal strength full in arms and legs. DTR symmetrical in arms. Knee jerks symmetrical. Cannot elicit ankle jerks. For me tone is normal in arms and legs. FFN is done well. HKS is done well. Toe tap is clumsy bilaterally. No grasp reflexes.     HR 50 to 60. Soft systolic murmur right superior sternum, does not radiate, unchanged form last visit. No carotid bruits audible.     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 This is based on interictal EEG and on apparent response to levetiracetam. Suspect he has had long term increased seizure tendency. I do not think seizures are related to cerebral degenerative syndrome or potential cerebral auto-immune disorder.  2) Probable recurrent convulsive seizure in September. This is based mostly on history, especially wife description of what sound like myoclonic seizures following the collapse with amnesia and prolonged period of confusion afterward. Not clear if he was compliant because no levetiracetam level. Alcohol use may have played some role but chronic. Gabapentin may worsen seizures in people with primary generalized epilepsy but " again has been taking this medication for years. Some information suggests donepezil can worsen seizures but again taking this medication for a long time. Seizures have remained under control with minor levetiracetam increase.  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 Again, alcohol did not help. Tho do not have previous MMSE, current results consistent with previous descriptions.  4) Has stopped drinking which is a positive development.     DISCUSSION  Again, discussed means of distinguishing absence seizures from daydreaming attacks. Asked to keep seizure diary. Praised him for stopping EtOH and encouraged him to continue doing so. Discussed driving and told him I do not think he should drive until March at the earliest. This is longer than typical 3 months.     PLAN:  1)  Continue levetiracetam 1500 mg in AM and 2000 mg in PM.  2) Follow up in five  months. Repeat MMSE at that time.  3) Continue followup with neuroimmunology.  4) VPA or ESM may be appropriate medications to consider next for seizures.     Total time 33 minutes, more than half counselling and coordinating cares.     Sandeep Owens MD

## 2019-11-15 NOTE — PROGRESS NOTES
[facility] Progress Note      Patient:  Jose Loco  :  1955   Age:  63 year old   Today's Office Visit:  11/15/2019    Epilepsy Data:    Patient History  Primary Epileptologist/Provider: Sandeep Owens M.D.  Patient Status: Not yet controlled  Epilepsy Syndrome: Generalized Epilepsy unspecified     Tests/Surgery History  Last EEG: oct 2015  Last MRI:     Seizure Record  Current Visit Date: 11/15/19  Previous Visit Date: 19  Months since last visit: 2.07    Seizure Type 1: Unspecified Staring Spell  Description of Sz Type 1: stares, still, unresponsive  # of Type 1 Seizure since last visit: 0  Freq. Type 1 / Month: 0    Seizure Type 2: Unspecified Convulsion  Description of Sz Type 2: collapse with stiffening and amnesia  # of Type 2 Seizure since last visit: 0  Freq. Type 2 / Month: 0    History of Present Illness:     No seizures. No staring spells.  Last GTC was in September.  He has stopped drinking altogether. Going to .    Current Outpatient Medications   Medication Sig Dispense Refill     albuterol (PROAIR HFA/PROVENTIL HFA/VENTOLIN HFA) 108 (90 Base) MCG/ACT inhaler Inhale 1-2 puffs into the lungs every 4 hours as needed 1-2 puffs every 4 hours prn.       allopurinol (ZYLOPRIM) 100 MG tablet 100mg tab by mouth 3/day       amoxicillin (AMOXIL) 500 MG tablet ONLY FOR DENTAL APPOINTMENT: Reported on 3/21/2017       aspirin 81 MG tablet Take by mouth daily       baclofen (LIORESAL) 10 MG tablet 10mg tab by mouth twice daily @ 6am and 10pm  2     carbidopa-levodopa (SINEMET)  MG tablet TAKE TWO TABLETS BY MOUTH THREE TIMES A  tablet 5     carbidopa-levodopa (SINEMET)  MG tablet 2 tablets by mouth three times daily @ 6am, 12noon, and 11pm 540 tablet 5     cholecalciferol (HM VITAMIN D3) 2000 units CAPS 2 x 2000 unit capsule by mouth daily @ 6am (may switch to evening) 100 capsule 2     clotrimazole (LOTRIMIN) 1 % external cream Apply topically  daily as needed (feet) Reported on 3/21/2017       COMPRESSION STOCKINGS 1 each       cyanocobalamin (VITAMIN B12) 1000 MCG/ML injection Inject 1 mL into the muscle every 30 days       diphenhydrAMINE (BENADRYL) 2 % external cream Apply topically as needed Reported on 3/21/2017       diphenhydrAMINE-zinc acetate (BENADRYL EXTRA STRENGTH) 2-0.1 % external cream        donepezil (ARICEPT) 5 MG tablet Take 1 tablet (5 mg) by mouth At Bedtime 90 tablet 1     DULoxetine (CYMBALTA) 30 MG capsule Take 1 capsule (30 mg) by mouth daily 14 capsule 0     DULoxetine (CYMBALTA) 60 MG capsule Take 1 capsule (60 mg) by mouth daily 90 capsule 1     ferrous fumarate 65 mg, [place]. FE,-Vitamin C 125 mg (VITRON-C)  MG TABS tablet 1 tab by mouth twice daily @ noon and 6pm       fluticasone (FLONASE) 50 MCG/ACT nasal spray As needed       furosemide (LASIX) 40 MG tablet TAKE TWO TABLETS BY MOUTH IN THE MORNING AND TAKE ONE TABLET BY MOUTH IN THE AFTERNOON  3     gabapentin (NEURONTIN) 800 MG tablet 800mg tab by mouth 3/day @ 6am, 12noon, 11pm       HYDROcodone-acetaminophen (NORCO) 5-325 MG per tablet as needed       Ketoprofen 10 % CREA Externally apply 1 Tube topically 2 times daily 1 Tube 1     levETIRAcetam (KEPPRA) 500 MG tablet Take three in AM and four in PM (total 3500 mg) 217 tablet 11     lidocaine (LIDODERM) 5 % patch Place 1 patch onto the skin daily as needed for moderate pain Reported on 3/21/2017       methadone (DOLOPHINE) 5 MG tablet 1-2 x 5mg tab by mouth nightly 1 tablet 0     Naftifine (NAFTIN) 2 % GEL Apply topically daily Reported on 3/21/2017       NEUPRO 3 MG/24HR PT24 3mg patch apply daily 90 patch 3     ondansetron (ZOFRAN) 4 MG tablet Take by mouth as needed for nausea or vomiting Reported on 3/21/2017       other medical supplies Cane (device) For home use. X 12 weeks.       PARoxetine (PAXIL) 40 MG tablet Take 1 tablet (40 mg) by mouth At Bedtime  1     potassium chloride ER (K-DUR/KLOR-CON M) 20 MEQ CR  "tablet 20meq tab by mouth daily @ noon       rOPINIRole (REQUIP) 1 MG tablet 1mg tab by mouth 4/day @ 6am, 12noon, 6pm, and 10-11pm 360 tablet 0     simvastatin (ZOCOR) 40 MG tablet 40mg tab by mouth at bedtime       traMADol (ULTRAM) 50 MG tablet Not clear if taking 50mg tab by mouth 3/day 1 tablet 0     traZODone (DESYREL) 50 MG tablet Not clear if taking trazodone at all. Was listed as 3/day       triamcinolone (KENALOG) 0.5 % cream Apply topically 3 times daily Reported on 3/21/2017       UNABLE TO FIND nonformulary  P7 - CROW/CLON/GEORGETTE/IMIP/LIDO/PENT - 10/0.2/6/3/5/3% Apply 1-2 gms to affected area 3-4x per day. Rub in for 1-2 minutes.       UNABLE TO FIND Diabetic shoes DX: Diabetic Neuropathy       UNABLE TO FIND Syringe w/ Needle, Disposable - 3 ML (syringe) 22 x 1\" As directed.          Medication Notes:    Taking GP helps with pain and restless legs.   Ketoprofen gel helped with peripheral neuropathy pain and was helpful but compound medication and insurance will not pain.   AED Medication Compliance:  compliant all of the time  Using a pill box:  Yes    Review of Systems:  Denies headaches. Denies loss of vision. Denies double vision. Denies dysphagia. Denies cough, wheezing, hemoptysis. Denies chest pain, leg swelling. Denies significant weight change, abdominal pain, nausea, vomiting, change in bowel habits, blood per rectum. Denies dysuria, hematuria, flank pain, or kidney stones.  Have you experienced a traumatic fall since your last visit: NO  Are these falls related to your seizures: NO    Other Issues:    Continues irritable. No violence. Wife feels memory is about the same.   Is patient safe to drive:  Not at present. Had been driving in [place] before the seizure. Only driving locally at the time.    Exam:    BP (!) 150/93   Pulse 65   Resp 16   Ht 5' 8\" (172.7 cm)   Wt 219 lb (99.3 kg)   SpO2 94%   BMI 33.30 kg/m       Wt Readings from Last 5 Encounters:   11/15/19 219 lb (99.3 kg) " "  10/02/19 223 lb 8 oz (101.4 kg)   09/13/19 214 lb (97.1 kg)   09/04/19 220 lb (99.8 kg)   08/16/19 211 lb 12.8 oz (96.1 kg)     MMSE today = 27. Had difficulty with spelling \"world\" backward. Interestingly recalled all three memory items after standard distraction. Pentagons clumsy but met standard and clock was done well. Normal base. Safe on own. Cannot stand on each leg alone. Romberg negative. VFF. EOMI with good smooth pursuit and no nystagmus today. Smile symmetrical. No drift, pronation or tremor. Proximal and distal strength full in arms and legs. DTR symmetrical in arms. Knee jerks symmetrical. Cannot elicit ankle jerks. For me tone is normal in arms and legs. FFN is done well. HKS is done well. Toe tap is clumsy bilaterally. No grasp reflexes.     HR 50 to 60. Soft systolic murmur right superior sternum, does not radiate, unchanged form last visit. No carotid bruits audible.     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 This is based on interictal EEG and on apparent response to levetiracetam. Suspect he has had long term increased seizure tendency. I do not think seizures are related to cerebral degenerative syndrome or potential cerebral auto-immune disorder.  2) Probable recurrent convulsive seizure in September. This is based mostly on history, especially wife description of what sound like myoclonic seizures following the collapse with amnesia and prolonged period of confusion afterward. Not clear if he was compliant because no levetiracetam level. Alcohol use may have played some role but chronic. Gabapentin may worsen seizures in people with primary generalized epilepsy but again has been taking this medication for years. Some information suggests donepezil can worsen seizures but again taking this medication for a long time. Seizures have remained under control with minor levetiracetam increase.  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 " Again, alcohol did not help. Tho do not have previous MMSE, current results consistent with previous descriptions.  4) Has stopped drinking which is a positive development.     DISCUSSION  Again, discussed means of distinguishing absence seizures from daydreaming attacks. Asked to keep seizure diary. Praised him for stopping EtOH and encouraged him to continue doing so. Discussed driving and told him I do not think he should drive until March at the earliest. This is longer than typical 3 months.     PLAN:  1) Continue levetiracetam 1500 mg in AM and 2000 mg in PM.  2) Follow up in five  months. Repeat MMSE at that time.  3) Continue followup with neuroimmunology.  4) VPA or ESM may be appropriate medications to consider next for seizures.     Total time 33 minutes, more than half counselling and coordinating cares.     Sandeep Owens MD

## 2019-11-15 NOTE — NURSING NOTE
Chief Complaint   Patient presents with     Seizures     RECHECK     P RETURN SEIZURE- 2 MONTH F/U       Adilson Arauz, EMT

## 2020-01-15 DIAGNOSIS — G20.C PARKINSONISM, UNSPECIFIED PARKINSONISM TYPE (H): ICD-10-CM

## 2020-01-15 RX ORDER — CARBIDOPA AND LEVODOPA 25; 100 MG/1; MG/1
TABLET ORAL
Qty: 180 TABLET | Refills: 4 | Status: SHIPPED | OUTPATIENT
Start: 2020-01-15 | End: 2020-08-03

## 2020-01-15 NOTE — TELEPHONE ENCOUNTER
Nils saw Dr. Owens on 11/15/2019. He last saw Dr. Ochoa on 3/15/2019. No future appointment scheduled with Dr. Ochoa.

## 2020-01-15 NOTE — TELEPHONE ENCOUNTER
Rx Authorization:    Requested Medication/ Dose:    Date last refill ordered: 8/5/19    Quantity ordered: 180tabs    # refills: 5    Date of last clinic visit with ordering provider: 11/15/19    Date of next clinic visit with ordering provider: 3/20/20    All pertinent protocol data (lab date/result):     Include pertinent information from patients message:

## 2020-02-08 ENCOUNTER — HEALTH MAINTENANCE LETTER (OUTPATIENT)
Age: 65
End: 2020-02-08

## 2020-03-20 ENCOUNTER — VIRTUAL VISIT (OUTPATIENT)
Dept: NEUROLOGY | Facility: CLINIC | Age: 65
End: 2020-03-20
Payer: COMMERCIAL

## 2020-03-20 DIAGNOSIS — R41.3 MEMORY DIFFICULTIES: Primary | ICD-10-CM

## 2020-03-20 NOTE — LETTER
"3/20/2020       RE: Jose Loco  [address] [facility] [id number]     Dear Colleague,    Thank you for referring your patient, Jose Loco, to the [facility] at [facility]. Please see a copy of my visit note below.    Joes Loco is a 64 year old male who is being evaluated via a billable telephone visit.      The patient has been notified of following:     \"This telephone visit will be conducted via a call between you and your physician/provider. We have found that certain health care needs can be provided without the need for a physical exam.  This service lets us provide the care you need with a short phone conversation.  If a prescription is necessary we can send it directly to your pharmacy.  If lab work is needed we can place an order for that and you can then stop by our lab to have the test done at a later time.    If during the course of the call the physician/provider feels a telephone visit is not appropriate, you will not be charged for this service.\"     Jose Loco was last seen in [facility] in August 2019. In the interim, he is doing pretty well and is having more good days. He is not as sleepy. He occasionally gets crabby and has outbursts, but not as often. He has stopped drinking alcohol.    He has had no seizures since a GTC seizure in Sept 2019. Levetiracetam dosage was increased. Other medication adjustments include increase of gabapentin to 1200 mg tid, and switching from Paxil to Cymbalta to treat depression. Donepezil was decreased from 10 mg a day to 5 mg due to episodes of bradycardia.    Mr. Loco sleeps well and takes 3-5 mg of melatonin. He is planning to talk with Dr. Maddox about resuming CPAP.       Chief Complaint   Patient presents with     Follow Up       I have reviewed and updated the patient's Past Medical History, Social History, Family History and Medication List.    Anabell " ABILIO Toscano    ALLERGIES  Patient has no known allergies.    Assessment/Plan:  Mr. Loco is a 64 year old right-handed male with history of generalized seizure disorder, alcohol abuse, elevated serum GAD65 and acetylcholine receptor antibodies, and diabetes, who presented to [facility] in Feb 2019 with 9 years of cognitive and behavioral issues, that had been stable to improved over that time. His exam was notable for slowed speech with oral apraxia and poor recall of a list of words.     He reports improving over the past year, which may be attributable to cessation of alcohol consumption. Remaining issues that can contribute to memory loss include untreated obstructive sleep apnea, seizure disorder, and use of opiate pain meds. The course is not consistent with a neurodegenerative disease and MRI brain did not show features of neurodegeneration.      Recommendations:    Discontinue donepezil.    Limit opiate containing medications.     Follow up with Dr. Patiño regarding seizure control.     Follow up with Dr. Ochoa regarding need for Sinemet and related meds    Follow up with [facility] as needed.     Start time 930  End time 944     14 minutes were spent on the phone with the patient today greater than 50% in counselling and coordination of care.           Again, thank you for allowing me to participate in the care of your patient.      Sincerely,    Jae Barragan MD

## 2020-03-20 NOTE — PROGRESS NOTES
"Jose Loco is a 64 year old male who is being evaluated via a billable telephone visit.      The patient has been notified of following:     \"This telephone visit will be conducted via a call between you and your physician/provider. We have found that certain health care needs can be provided without the need for a physical exam.  This service lets us provide the care you need with a short phone conversation.  If a prescription is necessary we can send it directly to your pharmacy.  If lab work is needed we can place an order for that and you can then stop by our lab to have the test done at a later time.    If during the course of the call the physician/provider feels a telephone visit is not appropriate, you will not be charged for this service.\"     Jose Loco was last seen in [facility] in August 2019. In the interim, he is doing pretty well and is having more good days. He is not as sleepy. He occasionally gets crabby and has outbursts, but not as often. He has stopped drinking alcohol.    He has had no seizures since a GTC seizure in Sept 2019. Levetiracetam dosage was increased. Other medication adjustments include increase of gabapentin to 1200 mg tid, and switching from Paxil to Cymbalta to treat depression. Donepezil was decreased from 10 mg a day to 5 mg due to episodes of bradycardia.    Mr. Loco sleeps well and takes 3-5 mg of melatonin. He is planning to talk with Dr. Maddox about resuming CPAP.       Chief Complaint   Patient presents with     Follow Up       I have reviewed and updated the patient's Past Medical History, Social History, Family History and Medication List.    Anabell Toscano CMA    ALLERGIES  Patient has no known allergies.    Assessment/Plan:  Mr. Loco is a 64 year old right-handed male with history of generalized seizure disorder, alcohol abuse, elevated serum GAD65 and acetylcholine receptor antibodies, and diabetes, who presented to [facility] " in Feb 2019 with 9 years of cognitive and behavioral issues, that had been stable to improved over that time. His exam was notable for slowed speech with oral apraxia and poor recall of a list of words.     He reports improving over the past year, which may be attributable to cessation of alcohol consumption. Remaining issues that can contribute to memory loss include untreated obstructive sleep apnea, seizure disorder, and use of opiate pain meds. The course is not consistent with a neurodegenerative disease and MRI brain did not show features of neurodegeneration.      Recommendations:    Discontinue donepezil.    Limit opiate containing medications.     Follow up with Dr. Patiño regarding seizure control.     Follow up with Dr. Ochoa regarding need for Sinemet and related meds    Follow up with [facility] as needed.     Start time 9:30 am  End time 9:44 am    14 minutes were spent on the phone with the patient today greater than 50% in counselling and coordination of care.

## 2020-04-17 ENCOUNTER — VIRTUAL VISIT (OUTPATIENT)
Dept: NEUROLOGY | Facility: CLINIC | Age: 65
End: 2020-04-17
Payer: COMMERCIAL

## 2020-04-17 DIAGNOSIS — F03.91 DEMENTIA WITH BEHAVIORAL DISTURBANCE, UNSPECIFIED DEMENTIA TYPE: ICD-10-CM

## 2020-04-17 RX ORDER — LEVETIRACETAM 500 MG/1
TABLET ORAL
Qty: 217 TABLET | Refills: 11 | Status: SHIPPED | OUTPATIENT
Start: 2020-04-17 | End: 2021-02-12

## 2020-04-17 RX ORDER — VALACYCLOVIR HYDROCHLORIDE 1 G/1
1 TABLET, FILM COATED ORAL
COMMUNITY
Start: 2020-04-16 | End: 2020-11-24

## 2020-04-17 ASSESSMENT — PAIN SCALES - GENERAL: PAINLEVEL: SEVERE PAIN (7)

## 2020-04-17 NOTE — LETTER
4/17/2020       RE: Jose Loco  [address] [id number]      Dear Mr Loco,    You had asked me to write a letter supporting use of ketoprofen gel for your restless legs. You have been diagnosed with severe restless legs which continue in spite of treatment with aggressive doses of gabapentin and ropinrole. You require occassional additional treatment with narcotic medications to control these. You have reported that ketoprofen gel, which needs to be compounded has been very helpful in controlling your restless legs and has reduced need for narcotic medications.    You also suffer from epileptic seizures and cognitive dysfunction. I would note that the sleep disruption associated with restless legs may worsen both your seizures and your cognitive dysfunction.    I therefore strongly support use of ketoprofen gel to help with your restless legs. This treatment would reduce narcotic use, reduce cognitive dysfunction and reduce risk that your seizures would return. Minimizing narcotic medication is of course also an important goal. I strongly believe that your medical insurance should cover this treatment modality.    Sincerely,    Sandeep Owens MD

## 2020-04-17 NOTE — PROGRESS NOTES
"          Jose Loco is a 64 year old male who is being evaluated via a billable telephone visit.      The patient has been notified of following:     \"This telephone visit will be conducted via a call between you and your physician/provider. We have found that certain health care needs can be provided without the need for a physical exam.  This service lets us provide the care you need with a short phone conversation.  If a prescription is necessary we can send it directly to your pharmacy.  If lab work is needed we can place an order for that and you can then stop by our lab to have the test done at a later time.    Telephone visits are billed at different rates depending on your insurance coverage. During this emergency period, for some insurers they may be billed the same as an in-person visit.  Please reach out to your insurance provider with any questions.    If during the course of the call the physician/provider feels a telephone visit is not appropriate, you will not be charged for this service.\"    Patient has given verbal consent for Telephone visit?  YES   How would you like to obtain your AVS? MAIL    Additional provider notes:      [facility] Epilepsy Care Progress Note      Patient:  Jose Loco  :  1955   Age:  64 year old   Today's Office Visit:  2020    Epilepsy Data:    Patient History  Primary Epileptologist/Provider: Sandeep Owens M.D.  Patient Status: Not yet controlled  Epilepsy Syndrome: Generalized Epilepsy unspecified     Tests/Surgery History  Last EEG: oct 2015  Last MRI: 2013    Seizure Record  Current Visit Date: 20  Previous Visit Date: 11/15/19  Months since last visit: 5.06    Seizure Type 1: Unspecified Staring Spell  Description of Sz Type 1: stares, still, unresponsive  # of Type 1 Seizure since last visit: 0  Freq. Type 1 / Month: 0    Seizure Type 2: Unspecified Convulsion  Description of Sz Type 2: collapse with stiffening and " amnesia  # of Type 2 Seizure since last visit: 0  Freq. Type 2 / Month: 0    History of Present Illness:     EPILEPSY ATTENDING PHONE NOTE  Clinic visit was changed to phone visit because in person visit was deemed high risk given current prevalence of COVID 19 in the community. Further, conversion to phone visit was pursued to reduce in person interactions during trip to physician, in waiting rooms, etc that could promote COVID 19 transmission. Video visit was recommended but patient refused and insisted on phone visit instead.    No seizures since last seen. Last seizure September 2019. No symptoms suggesting absence.  Spoke with Dr Barragan in March. Donepezil was discontinued.  Wife reports worsening periods of agitation, typically provoked by frustration.   Sometimes will apologize. Wife is not afraid of him during these periods.    Memory is no different. Does not get lost. Gets up at night but does not wander.  Restless legs about the same; better than a year ago; less periodic movements of sleep.  Worse if he misses gabapentin. Continues with RLS during the day. Reports that ketoprofen gel used in hospital was very effective but insurance will not pay for this and wants me to write a letter arguing for its use. This apparently has to be compounded at [facility].      Current Outpatient Medications   Medication Sig Dispense Refill     albuterol (PROAIR HFA/PROVENTIL HFA/VENTOLIN HFA) 108 (90 Base) MCG/ACT inhaler Inhale 1-2 puffs into the lungs every 4 hours as needed 1-2 puffs every 4 hours prn.       allopurinol (ZYLOPRIM) 100 MG tablet 100mg tab by mouth 3/day       amoxicillin (AMOXIL) 500 MG tablet ONLY FOR DENTAL APPOINTMENT: Reported on 3/21/2017       aspirin 81 MG tablet Take by mouth daily       baclofen (LIORESAL) 10 MG tablet 10mg tab by mouth twice daily @ 6am and 10pm  2     carbidopa-levodopa (SINEMET)  MG tablet TAKE TWO TABLETS BY MOUTH THREE TIMES A  tablet 4      carbidopa-levodopa (SINEMET)  MG tablet 2 tablets by mouth three times daily @ 6am, 12noon, and 11pm 540 tablet 5     cholecalciferol (HM VITAMIN D3) 2000 units CAPS 2 x 2000 unit capsule by mouth daily @ 6am (may switch to evening) 100 capsule 2     clotrimazole (LOTRIMIN) 1 % external cream Apply topically daily as needed (feet) Reported on 3/21/2017       COMPRESSION STOCKINGS 1 each       cyanocobalamin (VITAMIN B12) 1000 MCG/ML injection Inject 1 mL into the muscle every 30 days       diphenhydrAMINE (BENADRYL) 2 % external cream Apply topically as needed Reported on 3/21/2017       diphenhydrAMINE-zinc acetate (BENADRYL EXTRA STRENGTH) 2-0.1 % external cream        DULoxetine (CYMBALTA) 30 MG capsule Take 1 capsule (30 mg) by mouth daily 14 capsule 0     DULoxetine (CYMBALTA) 60 MG capsule Take 1 capsule (60 mg) by mouth daily 90 capsule 1     ferrous fumarate 65 mg, [place]. FE,-Vitamin C 125 mg (VITRON-C)  MG TABS tablet 1 tab by mouth twice daily @ noon and 6pm       fluticasone (FLONASE) 50 MCG/ACT nasal spray As needed       furosemide (LASIX) 40 MG tablet TAKE TWO TABLETS BY MOUTH IN THE MORNING AND TAKE ONE TABLET BY MOUTH IN THE AFTERNOON  3     gabapentin (NEURONTIN) 800 MG tablet 800mg tab by mouth 3/day @ 6am, 12noon, 11pm       HYDROcodone-acetaminophen (NORCO) 5-325 MG per tablet as needed       Ketoprofen 10 % CREA Externally apply 1 Tube topically 2 times daily 1 Tube 1     levETIRAcetam (KEPPRA) 500 MG tablet Take three in AM and four in PM (total 3500 mg) 217 tablet 11     lidocaine (LIDODERM) 5 % patch Place 1 patch onto the skin daily as needed for moderate pain Reported on 3/21/2017       methadone (DOLOPHINE) 5 MG tablet 1-2 x 5mg tab by mouth nightly 1 tablet 0     Naftifine (NAFTIN) 2 % GEL Apply topically daily Reported on 3/21/2017       NEUPRO 3 MG/24HR PT24 3mg patch apply daily 90 patch 3     ondansetron (ZOFRAN) 4 MG tablet Take by mouth as needed for nausea or vomiting  "Reported on 3/21/2017       other medical supplies Cane (device) For home use. X 12 weeks.       PARoxetine (PAXIL) 40 MG tablet Take 1 tablet (40 mg) by mouth At Bedtime  1     potassium chloride ER (K-DUR/KLOR-CON M) 20 MEQ CR tablet 20meq tab by mouth daily @ noon       rOPINIRole (REQUIP) 1 MG tablet 1mg tab by mouth 4/day @ 6am, 12noon, 6pm, and 10-11pm 360 tablet 0     simvastatin (ZOCOR) 40 MG tablet 40mg tab by mouth at bedtime       traMADol (ULTRAM) 50 MG tablet Not clear if taking 50mg tab by mouth 3/day 1 tablet 0     traZODone (DESYREL) 50 MG tablet Not clear if taking trazodone at all. Was listed as 3/day       triamcinolone (KENALOG) 0.5 % cream Apply topically 3 times daily Reported on 3/21/2017       UNABLE TO FIND nonformulary  P7 - CROW/CLON/GEORGETTE/IMIP/LIDO/PENT - 10/0.2/6/3/5/3% Apply 1-2 gms to affected area 3-4x per day. Rub in for 1-2 minutes.       UNABLE TO FIND Diabetic shoes DX: Diabetic Neuropathy       UNABLE TO FIND Syringe w/ Needle, Disposable - 3 ML (syringe) 22 x 1\" As directed.       valACYclovir (VALTREX) 1000 mg tablet Take 1 g by mouth          Medication Notes:    Using levetiracetam (500 mg) 8124-5129 for total 3500 mg/d.    AED Medication Compliance:  compliant all of the time  Using a pill box:  Yes    Review of Systems:  Denies headaches. Denies loss of vision. Denies double vision. Denies dysphagia. Denies cough, wheezing, hemoptysis. Denies chest pain, leg swelling. Denies significant weight change, abdominal pain, nausea, vomiting, change in bowel habits, blood per rectum. Denies dysuria, hematuria, flank pain, or kidney stones.  Have you experienced a traumatic fall since your last visit: NO  Are these falls related to your seizures: Not Applicable    Other Issues:    Diagnosed with shingles this week mid-back on right. GP increased to 3200 mg per day by primary care. Valtrex also started.  Is patient safe to drive:  Drives locally; no trouble with this. Safe to drive " "locally but should not drive in unfamiliar areas due to cognitive deficits.    Exam:    There were no vitals taken for this visit.     Wt Readings from Last 5 Encounters:   11/15/19 219 lb (99.3 kg)   10/02/19 223 lb 8 oz (101.4 kg)   09/13/19 214 lb (97.1 kg)   09/04/19 220 lb (99.8 kg)   08/16/19 211 lb 12.8 oz (96.1 kg)     Alert, oriented to day, month, year, but not date. World backward spelled as \"elorw\" Short term memory 2/3. Confabulates third item. When asked a second time intrudes \"world\". Mood appears fine and there is no dysarthria. Language is fluent and thought content is appropriate.      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 This is based on interictal EEG and on apparent response to levetiracetam. Suspect he has had long term increased seizure tendency. I do not think seizures are related to cerebral degenerative syndrome or potential cerebral auto-immune disorder.  2) Probable recurrent convulsive seizure in September. This is based mostly on history, especially wife description of what sound like myoclonic seizures following the collapse with amnesia and prolonged period of confusion afterward. Not clear if he was compliant because no levetiracetam level. Alcohol use may have played some role but chronic. Gabapentin may worsen seizures in people with primary generalized epilepsy but again has been taking this medication for years.  Seizures have remained under control since then with minor levetiracetam increase.  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 Again, alcohol did not help. Tho do not have previous MMSE, current results consistent with previous descriptions.  4) Has continued off alcohol.  5) No apparent worsening of cognitive function off donepezil.  6) Restless leg syndrome. Wants support for ketoprofen gel. This would reduce need for prn narcotics.     PLAN:  1) Continue levetiracetam 1500 mg in AM and 2000 mg in PM.  2) " Follow up in six months.  3) Continue followup with neuroimmunology.  4) VPA or ESM may be appropriate medications to consider next for seizures.  5) Wrote letter supporting use of ketoprofen gel.     Total time on phone 32 minutes. Call initiated by patient. Additional 5 minutes writing letter.     Sandeep Owens MD

## 2020-08-02 DIAGNOSIS — G20.C PARKINSONISM, UNSPECIFIED PARKINSONISM TYPE (H): ICD-10-CM

## 2020-08-03 RX ORDER — CARBIDOPA AND LEVODOPA 25; 100 MG/1; MG/1
TABLET ORAL
Qty: 180 TABLET | Refills: 2 | Status: SHIPPED | OUTPATIENT
Start: 2020-08-03 | End: 2020-11-17

## 2020-08-03 NOTE — TELEPHONE ENCOUNTER
Rx Authorization:    Requested Medication/ Dose: Carbidopa-Levodopa     Date last refill ordered: 1/15/20    Quantity ordered: 180    # refills: 4    Date of last clinic visit with ordering provider: 5/31/19    Date of next clinic visit with ordering provider: 0    All pertinent protocol data (lab date/result):     Include pertinent information from patients message:

## 2020-11-08 ENCOUNTER — HEALTH MAINTENANCE LETTER (OUTPATIENT)
Age: 65
End: 2020-11-08

## 2020-11-10 ENCOUNTER — TELEPHONE (OUTPATIENT)
Dept: NEUROLOGY | Facility: CLINIC | Age: 65
End: 2020-11-10

## 2020-11-10 DIAGNOSIS — G20.C PARKINSONISM, UNSPECIFIED PARKINSONISM TYPE (H): ICD-10-CM

## 2020-11-10 NOTE — TELEPHONE ENCOUNTER
Rx Authorization:    Requested Medication/ Dose CARBIDOPA-LEVODOPA 25-100mg    Date last refill ordered: 8/3/20    Quantity ordered: 18    # refills: 2    Date of last clinic visit with ordering provider: 4/17/20    Date of next clinic visit with ordering provider:     All pertinent protocol data (lab date/result):     Include pertinent information from patients message:

## 2020-11-11 RX ORDER — CARBIDOPA AND LEVODOPA 25; 100 MG/1; MG/1
TABLET ORAL
Qty: 180 TABLET | Refills: 2
Start: 2020-11-11

## 2020-11-11 NOTE — TELEPHONE ENCOUNTER
Nils has not seen Dr. Ochoa since 3/15/2019. He will need to make a follow up appointment. I will send a message to our clinic coordinators to schedule a follow-up. Currently he has 2 refills left on his carbidopa/levodopa  and does not need a new prescription.

## 2020-11-17 RX ORDER — ALLOPURINOL 100 MG/1
TABLET ORAL
COMMUNITY
Start: 2020-03-04 | End: 2020-11-24

## 2020-11-17 RX ORDER — GABAPENTIN 800 MG/1
TABLET ORAL
COMMUNITY
Start: 2020-04-16 | End: 2020-11-24

## 2020-11-17 RX ORDER — LORAZEPAM 1 MG/1
1 TABLET ORAL
COMMUNITY
Start: 2020-04-16 | End: 2020-11-24
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
## 2020-11-23 DIAGNOSIS — G20.C PARKINSONISM, UNSPECIFIED PARKINSONISM TYPE (H): ICD-10-CM

## 2020-11-23 RX ORDER — CARBIDOPA AND LEVODOPA 25; 100 MG/1; MG/1
TABLET ORAL
Qty: 180 TABLET | Refills: 2
Start: 2020-11-23

## 2020-11-23 NOTE — TELEPHONE ENCOUNTER
Nils has a virtual appointment tomorrow. Provider will discuss medications and can send in refills at tomorrows appointment.

## 2020-11-24 ENCOUNTER — VIRTUAL VISIT (OUTPATIENT)
Dept: NEUROLOGY | Facility: CLINIC | Age: 65
End: 2020-11-24
Payer: COMMERCIAL

## 2020-11-24 DIAGNOSIS — G20.C PARKINSONISM, UNSPECIFIED PARKINSONISM TYPE (H): Primary | ICD-10-CM

## 2020-11-24 DIAGNOSIS — E55.9 VITAMIN D DEFICIENCY: ICD-10-CM

## 2020-11-24 PROCEDURE — 99214 OFFICE O/P EST MOD 30 MIN: CPT | Mod: TEL | Performed by: PSYCHIATRY & NEUROLOGY

## 2020-11-24 RX ORDER — CARBIDOPA AND LEVODOPA 25; 100 MG/1; MG/1
TABLET ORAL
Qty: 540 TABLET | Refills: 5 | Status: SHIPPED | OUTPATIENT
Start: 2020-11-24 | End: 2021-12-16

## 2020-11-24 RX ORDER — FUROSEMIDE 40 MG
TABLET ORAL
Qty: 270 TABLET | Refills: 3 | COMMUNITY
Start: 2020-11-24

## 2020-11-24 RX ORDER — LORAZEPAM 1 MG/1
TABLET ORAL
COMMUNITY
Start: 2020-11-24

## 2020-11-24 RX ORDER — ALLOPURINOL 100 MG/1
TABLET ORAL
COMMUNITY
Start: 2020-11-24

## 2020-11-24 RX ORDER — ROPINIROLE 1 MG/1
TABLET, FILM COATED ORAL
Qty: 360 TABLET | Refills: 3 | COMMUNITY
Start: 2020-11-24

## 2020-11-24 RX ORDER — VALACYCLOVIR HYDROCHLORIDE 1 G/1
1 TABLET, FILM COATED ORAL DAILY PRN
COMMUNITY
Start: 2020-11-24

## 2020-11-24 NOTE — PATIENT INSTRUCTIONS
"Assessment:  (G20) Parkinsonism, unspecified Parkinsonism type (H)  (primary encounter diagnosis)  Datscan - not done  Carbidopa/levodopa Sinemet 25/100 2 tabs 3/day @ 6, noon and 11pm    Sleep disorder  Sleep apnea  RLS/PLMS?  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   Has norco for pain that he uses when he needs  Gabapentin 800mg 3/day and has not increased to 4/day  He has overlapping foot problem  Looks like he is \"walking on his right ankle bone\"  They are working with a podiatrist and may need surgery  He is taking baclofen 10mg twice daily    ENDO  Cholecalciferol Vitamin D3 2000 units twice daily   JULIA antibody  Elevated blood sugars with A1c was 6.8 on 10/30/2020    Gastric bypass  Alcohol consumption  Variable bowel function  Has had constipation and diarrhea in the past and is better now.  Not using ondansetron/zofran  No significant heartburn    Urinary problems  Has urinary frequency and urgency and is on the diuretic    Breathing  Has been good and not using the inhaler much if at all    heme  Iron   Hemoglobin was normal 10/30/2020  Taking aspirin 81mg  Taking iron twice daily at noon and 6pm   On b12 injections     Medications      6am 12p   6pm 10/11pm     Albuterol proair HFA/proventil HFA/ventolin (90 base) mcg/act inhaler  as needed           Allopurinol zyloprim 100mg  3          Amoxicillin amoxil  for dental           Aspirin 81mg  1           Baclofen 10mg  1     1      Carbidopa/levodopa sinemet 25/100 2  2   2     Cholecalciferol vitamin D3 2000 units   1  1       Clotrimazole lotrimin 1% cream  Not using now            Compression stockings           "   Cyanocobalamin vitamin b12 1000mcg/ml injection  monthly            Diphenhydramine-zinc acetate benadryl extra strength external cream As needed       Diphenhydramine HCL ex 2% topical As needed            Donepezil aricept 10mg  stopped        Duloxetine cymbalta 30mg stopped       Duloxetine cymbalta 60mg stopped       Ferrous fumarate 65mg,[place] FE, vitamin C 125mg vitron C    1 1       Fluticasone propionate flonase NA 50 mcg As needed           Furosemide lasix 40mg  2  1        Gabapentin neurontin 800mg 1 1   1     HM vitamin D3 2000 units See above            Hydrocodone- acetaminophen norco 5-325mg As needed            Ibuprofen advil/motrin 800mg ? taking            Iron-vitamin C 65mg iron - 125mg   see above           Ketoprofen 10% cream As needed       Levetiracetam keppra 500mg  3     4     Lidocaine lidodermin 5% patch As needed           Lorazepam ativan 1mg    As needed    Methadone dolophine 5mg        1-2     Naftifine naftin 2% gel daily           Neupro 3mg/24 hr pt24  see below           Ondansetron zofran   not taking           Paroxetine paxil 40mg        1     Potassium chloride SA Kdur Klor con 20meq CR tablet   1         Ropinirole requip 1mg  1 1 1 1  dr doll   Rotigotine Neupro 3mg/24 hr patch 1    Dr doll   Simvastatin zocor 40mg        1     Tramadol ultram 50mg Not taking          Trazodone desyrel 50mg Not taking       Triamcinolone kenalog 0.5% cream  as needed           Unable to find - diabetic shoes              Unable to fine - syringe             Unable to fine nonformulary gilmer/clon/hawa/imip/lido/pent 3-4/day            Valacyclovir valtrex 100mg As needed       Plan:    Sinemet refilled  Reviewed and updated all medications in table and chart  Return back in 6  Months.   Epilepsy return with Dr. REBECA Owens; last seen 4/17/2020 and may need to schedule an appointment as it has been 7 months since the last appointment with Dr. WHYTE

## 2020-11-24 NOTE — PROGRESS NOTES
"Jose Loco is a 64 year old male who is being evaluated via a billable telephone visit.      The patient has been notified of following:     \"This telephone visit will be conducted via a call between you and your physician/provider. We have found that certain health care needs can be provided without the need for a physical exam.  This service lets us provide the care you need with a short phone conversation.  If a prescription is necessary we can send it directly to your pharmacy.  If lab work is needed we can place an order for that and you can then stop by our lab to have the test done at a later time.    Telephone visits are billed at different rates depending on your insurance coverage. During this emergency period, for some insurers they may be billed the same as an in-person visit.  Please reach out to your insurance provider with any questions.    If during the course of the call the physician/provider feels a telephone visit is not appropriate, you will not be charged for this service.\"    Patient has given verbal consent for Telephone visit?  YES    What phone number would you like to be contacted at?   [phone]    How would you like to obtain your AVS? [facility]    Phone call duration: --- see below ---  minutes    Basilio Amaya EMT  "
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
## 2021-01-20 ENCOUNTER — DOCUMENTATION ONLY (OUTPATIENT)
Dept: CARE COORDINATION | Facility: CLINIC | Age: 66
End: 2021-01-20

## 2021-02-12 ENCOUNTER — VIRTUAL VISIT (OUTPATIENT)
Dept: NEUROLOGY | Facility: CLINIC | Age: 66
End: 2021-02-12
Payer: COMMERCIAL

## 2021-02-12 DIAGNOSIS — F03.91 DEMENTIA WITH BEHAVIORAL DISTURBANCE, UNSPECIFIED DEMENTIA TYPE: ICD-10-CM

## 2021-02-12 PROCEDURE — 99213 OFFICE O/P EST LOW 20 MIN: CPT | Mod: 95 | Performed by: PSYCHIATRY & NEUROLOGY

## 2021-02-12 RX ORDER — LEVETIRACETAM 500 MG/1
TABLET ORAL
Qty: 217 TABLET | Refills: 11 | Status: SHIPPED | OUTPATIENT
Start: 2021-02-12 | End: 2022-03-03

## 2021-02-12 NOTE — LETTER
2021       RE: Jose Loco  [address] [id number]     Dear Colleague,    Thank you for referring your patient, Jose Loco, to the [facility] [place] at [facility]. Please see a copy of my visit note below.    Nils is a 65 year old who is being evaluated via a billable telephone visit.      What phone number would you like to be contacted at? [phone]  How would you like to obtain your AVS? Mychart  Phone call duration: 23 minutes    [facility]    Patient:  Jose Loco  :  1955   Age:  65 year old   Today's Office Visit:  2021    Epilepsy Data:    Patient History  Primary Epileptologist/Provider: Sandeep Owens M.D.  Patient Status: Not yet controlled  Epilepsy Syndrome: Generalized Epilepsy unspecified     Tests/Surgery History  Last EEG: oct 2015  Last MRI:     Seizure Record  Current Visit Date: 21  Previous Visit Date: 20  Months since last visit: 9.89  Seizure Type 1: Unspecified Staring Spell  Description of Sz Type 1: stares, still, unresponsive  # of Type 1 Seizure since last visit: 0  Freq. Type 1 / Month: 0  Seizure Type 2: Unspecified Convulsion  Description of Sz Type 2: collapse with stiffening and amnesia  # of Type 2 Seizure since last visit: 0  Freq. Type 2 / Month: 0    History of Present Illness:     No seizures. Last seizure Sep 2019. No absences      Current Outpatient Medications   Medication Sig Dispense Refill     albuterol (PROAIR HFA/PROVENTIL HFA/VENTOLIN HFA) 108 (90 Base) MCG/ACT inhaler Inhale 1-2 puffs into the lungs every 4 hours as needed 1-2 puffs every 4 hours prn.       allopurinol (ZYLOPRIM) 100 MG tablet 3 x 100mg tab by mouth daily at noon       amoxicillin (AMOXIL) 500 MG tablet ONLY FOR DENTAL APPOINTMENT: Reported on 3/21/2017       aspirin 81 MG tablet Take by mouth daily        baclofen (LIORESAL) 10 MG tablet 10mg tab by mouth twice daily @ 6am and 10pm  2     carbidopa-levodopa (SINEMET)  MG tablet 2 tablets by mouth three times daily @ 6am, 12noon, and 11pm 540 tablet 5     cholecalciferol (HM VITAMIN D3) 50 MCG (2000 UT) CAPS 2000 unit capsule by mouth twice daily @ noon and 6pm 100 capsule 2     clotrimazole (LOTRIMIN) 1 % external cream Apply topically daily as needed (feet) Reported on 3/21/2017       COMPRESSION STOCKINGS 1 each       cyanocobalamin (VITAMIN B12) 1000 MCG/ML injection Inject 1 mL into the muscle every 30 days       diphenhydrAMINE (BENADRYL) 2 % external cream Apply topically as needed Reported on 3/21/2017       diphenhydrAMINE-zinc acetate (BENADRYL EXTRA STRENGTH) 2-0.1 % external cream        ferrous fumarate 65 mg, [place]. FE,-Vitamin C 125 mg (VITRON-C)  MG TABS tablet 1 tab by mouth twice daily @ noon and 6pm       fluticasone (FLONASE) 50 MCG/ACT nasal spray As needed       furosemide (LASIX) 40 MG tablet 2 x 40mg tab by mouth at 6am and 40mg tab by mouth at noon = 3/day 270 tablet 3     gabapentin (NEURONTIN) 800 MG tablet 800mg tab by mouth 3/day @ 6am, 12noon, 11pm       HYDROcodone-acetaminophen (NORCO) 5-325 MG per tablet as needed       Ketoprofen 10 % CREA Externally apply 1 Tube topically 2 times daily 1 Tube 1     levETIRAcetam (KEPPRA) 500 MG tablet Take three in AM and four in PM (total 3500 mg) 217 tablet 11     lidocaine (LIDODERM) 5 % patch Place 1 patch onto the skin daily as needed for moderate pain Reported on 3/21/2017       LORazepam (ATIVAN) 1 MG tablet 1mg tab by mouth nightly as needed       methadone (DOLOPHINE) 5 MG tablet 1-2 x 5mg tab by mouth nightly 1 tablet 0     Naftifine (NAFTIN) 2 % GEL Apply topically daily Reported on 3/21/2017       other medical supplies Cane (device) For home use. X 12 weeks.       PARoxetine (PAXIL) 40 MG tablet Take 1 tablet (40 mg) by mouth At Bedtime  1     potassium chloride ER  "(K-DUR/KLOR-CON M) 20 MEQ CR tablet 20meq tab by mouth daily @ noon       rOPINIRole (REQUIP) 1 MG tablet 1mg tab by mouth 4/day @ 6am, 12noon, 6pm, and 10-11pm 360 tablet 3     rotigotine (NEUPRO) 3 MG/24HR 24 hr patch Place 1 patch onto the skin daily 90 patch 3     simvastatin (ZOCOR) 40 MG tablet 40mg tab by mouth at bedtime       triamcinolone (KENALOG) 0.5 % cream Apply topically 3 times daily Reported on 3/21/2017       UNABLE TO FIND nonformulary  P7 - CROW/CLON/GEORGETTE/IMIP/LIDO/PENT - 10/0.2/6/3/5/3% Apply 1-2 gms to affected area 3-4x per day. Rub in for 1-2 minutes.       UNABLE TO FIND Diabetic shoes DX: Diabetic Neuropathy       UNABLE TO FIND Syringe w/ Needle, Disposable - 3 ML (syringe) 22 x 1\" As directed.       valACYclovir (VALTREX) 1000 mg tablet Take 1 tablet (1,000 mg) by mouth daily as needed          Medication Notes:  Levetiracetam dose as above.      AED Medication Compliance:  compliant all of the time  Using a pill box:  Yes    Review of Systems:  No leg weakness. No hand weakness. No tremor. Gait unchanged per wife.No leg tingling. Memory same.  Have you experienced a traumatic fall since your last visit: Yes; fell in garage November 2020 while trying to open garage door, did not hit head.  Are these falls related to your seizures: Not Applicable    Other Issues:    Memory about the same per patient; wife notes that waxes and wanes but overall no worse.  Continues with occasional irritability. Reports that he has been drinking less alcohol.  RLS worse because cannot get out during cold. More cramping. Still using gabapentin and methadone.  Is patient safe to drive:  Yes; still drives into town. Doesn't get lost.    Exam:    There were no vitals taken for this visit.     Wt Readings from Last 5 Encounters:   11/15/19 99.3 kg (219 lb)   10/02/19 101.4 kg (223 lb 8 oz)   09/13/19 97.1 kg (214 lb)   09/04/19 99.8 kg (220 lb)   08/16/19 96.1 kg (211 lb 12.8 oz)       States date correctly. " "Oriented to day, month, year. Short term memory 2/3 verbal objects after distractor third item is the distractor. \"[place]\" spelled backward: [place]. Able to state 7 animals in 30 seconds.  Slow speech but fluent. No dysarthria.    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 This is based on interictal EEG and on apparent response to levetiracetam. Suspect he has had long term increased seizure tendency. I do not think seizures are related to cerebral degenerative syndrome or potential cerebral auto-immune disorder.  2) Continues with complete seizure control since increase of levetiracetam in Sep 2019. Alcohol could have played some role in that breakthrough seizure, appears less alcohol use currently.  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 Again, alcohol did not help. Tho do not have previous MMSE, current results consistent with previous descriptions.  4) No apparent worsening of cognitive function off donepezil.  5) Restless leg syndrome appears stable on current gabapentin and methadone.     PLAN:  1) Continue levetiracetam 1500 mg in AM and 2000 mg in PM. Refilled Rx today.  2) Follow up in six months.  3) Continue followup with neuroimmunology.  4) VPA or ESM may be appropriate medications to consider next for seizures.     Total time on phone 23 minutes. Call initiated by patient. Additional 5 minutes generating note.     Sandeep Owens MD      "

## 2021-02-12 NOTE — PROGRESS NOTES
Nils is a 65 year old who is being evaluated via a billable telephone visit.      What phone number would you like to be contacted at? [phone]  How would you like to obtain your AVS? Lisa  Phone call duration: 23 minutes    [facility] Progress Note      Patient:  Jose Loco  :  1955   Age:  65 year old   Today's Office Visit:  2021    Epilepsy Data:    Patient History  Primary Epileptologist/Provider: Sandeep Owens M.D.  Patient Status: Not yet controlled  Epilepsy Syndrome: Generalized Epilepsy unspecified     Tests/Surgery History  Last EEG: oct 2015  Last MRI:     Seizure Record  Current Visit Date: 21  Previous Visit Date: 20  Months since last visit: 9.89  Seizure Type 1: Unspecified Staring Spell  Description of Sz Type 1: stares, still, unresponsive  # of Type 1 Seizure since last visit: 0  Freq. Type 1 / Month: 0  Seizure Type 2: Unspecified Convulsion  Description of Sz Type 2: collapse with stiffening and amnesia  # of Type 2 Seizure since last visit: 0  Freq. Type 2 / Month: 0    History of Present Illness:     No seizures. Last seizure Sep 2019. No absences      Current Outpatient Medications   Medication Sig Dispense Refill     albuterol (PROAIR HFA/PROVENTIL HFA/VENTOLIN HFA) 108 (90 Base) MCG/ACT inhaler Inhale 1-2 puffs into the lungs every 4 hours as needed 1-2 puffs every 4 hours prn.       allopurinol (ZYLOPRIM) 100 MG tablet 3 x 100mg tab by mouth daily at noon       amoxicillin (AMOXIL) 500 MG tablet ONLY FOR DENTAL APPOINTMENT: Reported on 3/21/2017       aspirin 81 MG tablet Take by mouth daily       baclofen (LIORESAL) 10 MG tablet 10mg tab by mouth twice daily @ 6am and 10pm  2     carbidopa-levodopa (SINEMET)  MG tablet 2 tablets by mouth three times daily @ 6am, 12noon, and 11pm 540 tablet 5     cholecalciferol (HM VITAMIN D3) 50 MCG (2000 UT) CAPS 2000 unit capsule by mouth twice daily @ noon and 6pm  100 capsule 2     clotrimazole (LOTRIMIN) 1 % external cream Apply topically daily as needed (feet) Reported on 3/21/2017       COMPRESSION STOCKINGS 1 each       cyanocobalamin (VITAMIN B12) 1000 MCG/ML injection Inject 1 mL into the muscle every 30 days       diphenhydrAMINE (BENADRYL) 2 % external cream Apply topically as needed Reported on 3/21/2017       diphenhydrAMINE-zinc acetate (BENADRYL EXTRA STRENGTH) 2-0.1 % external cream        ferrous fumarate 65 mg, [place]. FE,-Vitamin C 125 mg (VITRON-C)  MG TABS tablet 1 tab by mouth twice daily @ noon and 6pm       fluticasone (FLONASE) 50 MCG/ACT nasal spray As needed       furosemide (LASIX) 40 MG tablet 2 x 40mg tab by mouth at 6am and 40mg tab by mouth at noon = 3/day 270 tablet 3     gabapentin (NEURONTIN) 800 MG tablet 800mg tab by mouth 3/day @ 6am, 12noon, 11pm       HYDROcodone-acetaminophen (NORCO) 5-325 MG per tablet as needed       Ketoprofen 10 % CREA Externally apply 1 Tube topically 2 times daily 1 Tube 1     levETIRAcetam (KEPPRA) 500 MG tablet Take three in AM and four in PM (total 3500 mg) 217 tablet 11     lidocaine (LIDODERM) 5 % patch Place 1 patch onto the skin daily as needed for moderate pain Reported on 3/21/2017       LORazepam (ATIVAN) 1 MG tablet 1mg tab by mouth nightly as needed       methadone (DOLOPHINE) 5 MG tablet 1-2 x 5mg tab by mouth nightly 1 tablet 0     Naftifine (NAFTIN) 2 % GEL Apply topically daily Reported on 3/21/2017       other medical supplies Cane (device) For home use. X 12 weeks.       PARoxetine (PAXIL) 40 MG tablet Take 1 tablet (40 mg) by mouth At Bedtime  1     potassium chloride ER (K-DUR/KLOR-CON M) 20 MEQ CR tablet 20meq tab by mouth daily @ noon       rOPINIRole (REQUIP) 1 MG tablet 1mg tab by mouth 4/day @ 6am, 12noon, 6pm, and 10-11pm 360 tablet 3     rotigotine (NEUPRO) 3 MG/24HR 24 hr patch Place 1 patch onto the skin daily 90 patch 3     simvastatin (ZOCOR) 40 MG tablet 40mg tab by mouth at  "bedtime       triamcinolone (KENALOG) 0.5 % cream Apply topically 3 times daily Reported on 3/21/2017       UNABLE TO FIND nonformulary  P7 - CROW/CLON/GEORGETTE/IMIP/LIDO/PENT - 10/0.2/6/3/5/3% Apply 1-2 gms to affected area 3-4x per day. Rub in for 1-2 minutes.       UNABLE TO FIND Diabetic shoes DX: Diabetic Neuropathy       UNABLE TO FIND Syringe w/ Needle, Disposable - 3 ML (syringe) 22 x 1\" As directed.       valACYclovir (VALTREX) 1000 mg tablet Take 1 tablet (1,000 mg) by mouth daily as needed          Medication Notes:  Levetiracetam dose as above.      AED Medication Compliance:  compliant all of the time  Using a pill box:  Yes    Review of Systems:  No leg weakness. No hand weakness. No tremor. Gait unchanged per wife.No leg tingling. Memory same.  Have you experienced a traumatic fall since your last visit: Yes; fell in garage November 2020 while trying to open garage door, did not hit head.  Are these falls related to your seizures: Not Applicable    Other Issues:    Memory about the same per patient; wife notes that waxes and wanes but overall no worse.  Continues with occasional irritability. Reports that he has been drinking less alcohol.  RLS worse because cannot get out during cold. More cramping. Still using gabapentin and methadone.  Is patient safe to drive:  Yes; still drives into town. Doesn't get lost.    Exam:    There were no vitals taken for this visit.     Wt Readings from Last 5 Encounters:   11/15/19 99.3 kg (219 lb)   10/02/19 101.4 kg (223 lb 8 oz)   09/13/19 97.1 kg (214 lb)   09/04/19 99.8 kg (220 lb)   08/16/19 96.1 kg (211 lb 12.8 oz)       States date correctly. Oriented to day, month, year. Short term memory 2/3 verbal objects after distractor third item is the distractor. \"[place]\" spelled backward: [place]. Able to state 7 animals in 30 seconds.  Slow speech but fluent. No dysarthria.    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 This is " based on interictal EEG and on apparent response to levetiracetam. Suspect he has had long term increased seizure tendency. I do not think seizures are related to cerebral degenerative syndrome or potential cerebral auto-immune disorder.  2) Continues with complete seizure control since increase of levetiracetam in Sep 2019. Alcohol could have played some role in that breakthrough seizure, appears less alcohol use currently.  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 Again, alcohol did not help. Tho do not have previous MMSE, current results consistent with previous descriptions.  4) No apparent worsening of cognitive function off donepezil.  5) Restless leg syndrome appears stable on current gabapentin and methadone.     PLAN:  1) Continue levetiracetam 1500 mg in AM and 2000 mg in PM. Refilled Rx today.  2) Follow up in six months.  3) Continue followup with neuroimmunology.  4) VPA or ESM may be appropriate medications to consider next for seizures.     Total time on phone 23 minutes. Call initiated by patient. Additional 5 minutes generating note.     Sandeep Owens MD

## 2021-03-28 ENCOUNTER — HEALTH MAINTENANCE LETTER (OUTPATIENT)
Age: 66
End: 2021-03-28

## 2021-05-15 DIAGNOSIS — F03.91 DEMENTIA WITH BEHAVIORAL DISTURBANCE, UNSPECIFIED DEMENTIA TYPE: ICD-10-CM

## 2021-05-18 RX ORDER — LEVETIRACETAM 500 MG/1
TABLET ORAL
Qty: 217 TABLET | Refills: 11 | OUTPATIENT
Start: 2021-05-18

## 2021-05-22 ENCOUNTER — HEALTH MAINTENANCE LETTER (OUTPATIENT)
Age: 66
End: 2021-05-22
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
## 2021-05-30 VITALS — BODY MASS INDEX: 36.8 KG/M2 | WEIGHT: 242 LBS

## 2021-06-01 ENCOUNTER — RECORDS - HEALTHEAST (OUTPATIENT)
Dept: ADMINISTRATIVE | Facility: CLINIC | Age: 66
End: 2021-06-01

## 2021-06-01 ENCOUNTER — VIRTUAL VISIT (OUTPATIENT)
Dept: NEUROLOGY | Facility: CLINIC | Age: 66
End: 2021-06-01
Payer: COMMERCIAL

## 2021-06-01 DIAGNOSIS — F03.91 DEMENTIA WITH BEHAVIORAL DISTURBANCE, UNSPECIFIED DEMENTIA TYPE: ICD-10-CM

## 2021-06-01 DIAGNOSIS — G20.C PARKINSONISM, UNSPECIFIED PARKINSONISM TYPE (H): Primary | ICD-10-CM

## 2021-06-01 PROBLEM — M50.20 DISPLACEMENT OF CERVICAL INTERVERTEBRAL DISC WITHOUT MYELOPATHY: Status: ACTIVE | Noted: 2018-10-05

## 2021-06-01 PROBLEM — Z00.00 ROUTINE GENERAL MEDICAL EXAMINATION AT A HEALTH CARE FACILITY: Status: ACTIVE | Noted: 2018-10-05

## 2021-06-01 PROCEDURE — 99214 OFFICE O/P EST MOD 30 MIN: CPT | Mod: 95 | Performed by: PSYCHIATRY & NEUROLOGY

## 2021-06-01 NOTE — PROGRESS NOTES
Nils is a 65 year old who is being evaluated via a billable telephone visit.      What phone number would you like to be contacted at? [phone]  How would you like to obtain your AVS? Yunait  Phone call duration: -- see below ---  minutes
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
## 2021-06-01 NOTE — PATIENT INSTRUCTIONS
"(G20) Parkinsonism, unspecified Parkinsonism type (H)  (primary encounter diagnosis)  (F03.91) Dementia with behavioral disturbance, unspecified dementia type (H)    Dopamine scan Datscan - 5/23/2019  A presynaptic dopaminergic deficit not present. Some asymmetry noted.     Carbidopa/levodopa Sinemet 25/100 2 tabs 3/day @ 6, noon and 11pm    Balance and walking has some problems  He has had near falls  Not clear how much sinemet is helping     Sleep disorder  Sleep apnea  RLS/PLMS?  Ketoprofen gel  Ropinirole requip 1mg 4/day at 6am, 12noon, 6pm and 10-11pm  Rotigotine Neupro 3mg/24 hr patch    Right leg swelling - right ankle issue  4/2021  Ultrasound venous right leg:  No deep venous thrombosis in the right lower extremity.  Went to  lymphedema clinic long time ago  Furosemide lasix 40mg  Potassium chloride SA Kdur Klor con 20meq CR tablet     Brain MRI - nonspecific changes 3/2019  CT scan 4/7/2021 - Normal head CT.     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      C spine injury  Has more leg pain - right foot is bad  Brace that was made.   Has norco for pain that he uses when he needs    Baclofen 10mg lioresal 10mg twice daily  Gabapentin 800mg 3/day and has not increased to 4/day  Methadone dolophine 5mg     He has overlapping foot problem  Looks like he is \"walking on his right ankle bone\"  podiatrist and may need surgery - would need to be in a nursing home  Ongoing right ankle issue    Valacyclovir valtrex 100mg     ENDO  Cholecalciferol Vitamin D3 2000 units twice daily   JULIA antibody  Elevated blood sugars with A1c was 6.8 on 10/30/2020  Simvastatin zocor 40mg "      Gastric bypass  Alcohol consumption  Variable bowel function  Has had constipation and diarrhea in the past and is better now.  Not using ondansetron/zofran  No significant heartburn     Breathing  Has been good and not using the inhaler much if at all  Albuterol proair HFA/proventil HFA/ventolin (90 base) mcg/act inhaler  Fluticasone propionate flonase NA 50 mcg     heme  Iron   Hemoglobin was normal 10/30/2020  aspirin 81mg  Cyanocobalamin vitamin b12 1000mcg/ml injection  monthly  Ferrous fumarate 65mg,[place] FE, vitamin C 125mg vitron C 65-12  Iron twice daily at noon and 6pm     Lorazepam ativan 1mg  Paroxetine paxil 40mg   Both parents passed away December 2020  Were in senior living  His mother had dementia    GI  Ondansetron zofran -not taking     Medications      6am 12p   6pm 10/11pm     Albuterol proair HFA/proventil HFA/ventolin (90 base) mcg/act inhaler  as needed           Allopurinol zyloprim 100mg   3          Amoxicillin amoxil  for dental           Aspirin 81mg  1           Baclofen 10mg lioresal  1     1      Carbidopa/levodopa sinemet 25/100 2  2   2     Cholecalciferol vitamin D3 2000 units    1  1       Clotrimazole lotrimin 1% cream  Not using now            Compression stockings             Cyanocobalamin vitamin b12 1000mcg/ml injection  monthly            Diphenhydramine-zinc acetate benadryl extra strength external cream As needed           Diphenhydramine HCL ex 2% topical As needed            Donepezil aricept 10mg  stopped           Ferrous fumarate 65mg,[place] FE, vitamin C 125mg vitron C    1 1       Fluticasone propionate flonase NA 50 mcg As needed           Furosemide lasix 40mg  2  1         Gabapentin neurontin 800mg 1 1   1     HM vitamin D3 2000 units See above            Hydrocodone- acetaminophen norco 5-325mg As needed            Ibuprofen advil/motrin 800mg ? taking            Iron-vitamin C 65mg iron - 125mg   see above           Ketoprofen 10% cream  As needed           Levetiracetam keppra 500mg  3     4     Lidocaine lidodermin 5% patch As needed           Lorazepam ativan 1mg       As needed     Methadone dolophine 5mg        1-2     Naftifine naftin 2% gel daily           Neupro 3mg/24 hr pt24  see below           Ondansetron zofran   not taking           Paroxetine paxil 40mg        1     Potassium chloride SA Kdur Klor con 20meq CR tablet   1         Ropinirole requip 1mg  1 1 1 1  dr doll   Rotigotine Neupro 3mg/24 hr patch 1       Dr doll   Simvastatin zocor 40mg        1     Tramadol ultram 50mg Not taking           Trazodone desyrel 50mg Not taking           Triamcinolone kenalog 0.5% cream  as needed           Unable to find - diabetic shoes              Unable to fine - syringe             Unable to fine nonformulary gilmer/clon/hawa/imip/lido/pent 3-4/day            Valacyclovir valtrex 100mg As needed             Plan:    Return back for face to face visit  Remain on sinemet for now. Consider weaning.

## 2021-06-09 NOTE — PROGRESS NOTES
Persons accompanying you (the patient) today: Regina     How have you been doing since we saw you last? Please note any concerns.  Pt have some few concerns and will talk to Dr. Mcclendon.    Please list any surgeries or procedures you have had since we saw you last:  none    Have you had any falls since your last visit? No    Do you have any pain today? No    With whom do you currently reside? (alone, spouse, family, assisted living, nursing home)  Pt live with his wife in a slip home with their private entry.  If assisted living or nursing home, please note name and location of facility:

## 2021-06-09 NOTE — PROGRESS NOTES
Assessment / Impression   1.  Dementia likely multifactorial in etiology  2.  Seizure disorder  3.  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  Other medical problems as noted      Plan:   1.  Repeat neuropsychometric testing  2.  Strong encouragement given to achieving and maintaining sobriety, referrals offered    A long conversation was held with the patient and spouse Regina in attendance.  The patient appears to be stable to improved over that noted one year ago.  I note he is continuing to receive corticosteroid infusions indicating continued concern regarding an autoimmune CNS process.  The patient's cognitive difficulties are undoubtedly multifactorial in nature.  I emphasized once again wellness management along with adequate treatments of seizures and sobriety as a good strategy moving forward.  As the patient is undergoing a tapering schedule of steroid therapy, repeat neuropsychometric testing would be reasonable.  The patient and spouse wish to review results of neuropsychometric testing with her neurologist at the [facility] and therefore I will not make in a return appointment for the patient to discuss these findings.    With respect to ongoing management, I believe I could step back and allow other physicians to continue management at this point in time so as to redundancy in care management.  The patient will be given a follow-up in 1 year however I could see him at any time.  The patient may also cancel follow-ups through our clinic if there is no perceived need for continuing to follow.    Total time for this evaluation was 30 minutes with majority of time spent in counseling.    Subjective:     HPI: Jose Loco is a 61 y.o. male with above-noted diagnoses who returns for follow-up.  The patient appears more robust and more capable cognitively than that noted previously.  He apparently continues to struggle with his alcoholism with consumption of beer on a frequent basis.  The  patient's spouse does note improvement in stability in Jose's cognitive and functional performance.    The patient today offers no complaints I do note him to be more attentive and more motivated than that noted previously.          Review of Systems:          As above        Objective:     Vitals:    03/22/17 1111   BP: 137/72   Pulse: (!) 57   Weight: (!) 242 lb (109.8 kg)       No results found for this or any previous visit (from the past 24 hour(s)).    Physical Exam: The patient is more neatly groomed today than that noted previously.  He is casually dressed.  I do note him to be alert and interactive but with some difficulty with respect to motivation.  No fluctuation in level of consciousness or urvashi distractibility noted.  He does seem a bit in different today.  He denies symptoms of depression.  No evidence of abnormal thought content or form.

## 2021-06-09 NOTE — PROGRESS NOTES
"Occupational Therapy    Medicare Insurance    Units: 1 Karolina  Total Minutes: 55    Medical Diagnosis: Dementia  Treatment Diagnosis: Cognitive Impairment  Referring Practitioner: Dr. Mcclendon  Date of onset: 3-7-17  Start of care: March 22, 2017    Mr.Michael ANGEL Loco was referred by Amador Mcclendon MD for an occupational therapy outpatient cognitive evaluation. The assessments used in this evaluation will help determine if cognitive impairment is present and if so, how functional daily activity may be affected.  Following the assessments, the occupational therapist may offer education and recommendations to assist caregivers in providing the optimal level of support for their loved one. Jose was seen on 3/22/2017 and was accompanied by his wife, Regina. Nils was see 2 years ago for a baseline cognitive functional assessment and returns today for a follow up.  He presents as appropriately groomed and dressed and ambulating independently, although slowly and somewhat labored. He states he has \"bad knees\".  Nils is pleasant and fully cooperative although OT notices slow processing- it takes him an extended amount of time to complete a task. The following information regarding I/ADL's was provided by Nils and Regina and note that the ACL was not completed due to time constraints- same reason why the MOCA was not completed in full.  Two years ago, the ACL nor CPT was completed for the same reason, although this was because he arrived late at that appointment.     Living Arrangement:  Nils lives in his own home with Regina.   Homemaking:  She manages all housekeeping and meal preparation, stating that he used to help out a lot more than he can or does now.   Financial Management: Regina manages.   Medications: Regina sets up weekly or daily meds and gives reminders.   Driving:  He is no longer driving.   ADL:  Regina needs to provide reminders and cues for self cares. She states his judgement is poor in what he chooses to wear " "(boxers as \"shorts\" in public) and can be resistive to her attempts to help with cues.     Pain: none stated    OBJECTIVE  Results of assessments are as follows:      3-22-17 1-15  ACL:   NT  NT  CPT:   4.8 /5.8  NT  MOCA:  16 /26 16/30    The above assessment scores indicate a Moderate  level of impairment.     ASSESSMENT  Recommendations:  Cognitive functioning recommendations: 4.6-4.8: The assessment scores indicate a recommendation for an assisted living environment with daily check-in supervision (i.e. at home with assistance or assisted living facility). Assistance with managing medications and finances is recommended as Nils may have increased difficulty with complex problem solving. Learning new skills and information may be very challenging for Jose but he may be able to do so with a lot of repetition. Problem solving is often challenging especially when it is not anticipated or expected. Jose may lack insight into his deficits indicating an increased need for assistance with complex tasks requiring accuracy for safety. Supervision is recommended for Jose when using hot tools and major appliances during meal preparation. Driving is not recommended for Jose due to difficulty with divided attention and complex problem solving. Further, Jose may benefit from increased structure throughout the day creating healthy habits and routines and eliminating his need to problem solve from one task to another.    Based on the report from Regina, I was somewhat surprised that Nils scored as high as he did on the CPT. Given his level of assistance required for managing daily activity, including self cares, I would have expected his scores to be a bit lower. It's possible that motivation is a factor in his greater need for assistance at home, or that he performs very well on a structured assessment but is unable to manage less structured activity at home. It sounds as if he is exhibiting some behaviors in " response to his wife's attempts at assistance as well- irritability and resistiveness to her cues and reminders. One thing I distinctly note during this evaluation is his difficulty with verbal direction- he appeared to have much more difficulty with verbal instruction that with tasks that may be more self explanatory with visual cues.      Thank you for this referral.  If I can be of further assistance, please do not hesitate to contact me.  I did not have time to review the test results with Nils and Regina, as we ran out of time during our appointment and he was seeing Dr. Mcclendon immediately following. These results were made available for that appointment.     MEDICARE PATIENT: No    Physician Recommendation:  1. I certify the need for these services furnished within this plan and while under my care. I agree with the therapist's recommendation for plan of care.  2. If there is any recommendation for modification of therapy plan, please indicate below.    Debby Hoang, OT

## 2021-09-11 ENCOUNTER — HEALTH MAINTENANCE LETTER (OUTPATIENT)
Age: 66
End: 2021-09-11
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
## 2021-12-07 ENCOUNTER — VIRTUAL VISIT (OUTPATIENT)
Dept: NEUROLOGY | Facility: CLINIC | Age: 66
End: 2021-12-07
Payer: COMMERCIAL

## 2021-12-07 DIAGNOSIS — Z01.89 LABORATORY TEST: Primary | ICD-10-CM

## 2021-12-07 DIAGNOSIS — F03.91 DEMENTIA WITH BEHAVIORAL DISTURBANCE, UNSPECIFIED DEMENTIA TYPE: ICD-10-CM

## 2021-12-07 PROBLEM — A41.9 SEPSIS (H): Status: ACTIVE | Noted: 2021-07-19

## 2021-12-07 PROBLEM — G62.2 POLYNEUROPATHY DUE TO OTHER TOXIC AGENTS (H): Status: ACTIVE | Noted: 2021-08-02

## 2021-12-07 PROBLEM — K40.90 RIGHT INGUINAL HERNIA: Status: ACTIVE | Noted: 2021-07-19

## 2021-12-07 PROBLEM — E43 EDEMA DUE TO MALNUTRITION, DUE TO UNSPECIFIED MALNUTRITION TYPE (H): Status: ACTIVE | Noted: 2021-08-02

## 2021-12-07 PROBLEM — K42.9 UMBILICAL HERNIA WITHOUT OBSTRUCTION AND WITHOUT GANGRENE: Status: ACTIVE | Noted: 2021-07-19

## 2021-12-07 PROBLEM — K40.20 BILATERAL INGUINAL HERNIA WITHOUT OBSTRUCTION OR GANGRENE: Status: ACTIVE | Noted: 2021-07-21

## 2021-12-07 PROCEDURE — 99214 OFFICE O/P EST MOD 30 MIN: CPT | Mod: 95 | Performed by: PSYCHIATRY & NEUROLOGY

## 2021-12-07 RX ORDER — SENNOSIDES 8.6 MG/1
TABLET ORAL
COMMUNITY
Start: 2021-07-20 | End: 2024-04-11

## 2021-12-07 RX ORDER — TRAMADOL HYDROCHLORIDE 50 MG/1
TABLET ORAL
COMMUNITY
Start: 2021-07-20 | End: 2023-09-29

## 2021-12-07 RX ORDER — METFORMIN HCL 500 MG
TABLET, EXTENDED RELEASE 24 HR ORAL
COMMUNITY
Start: 2021-06-14

## 2021-12-07 RX ORDER — TRIAMCINOLONE ACETONIDE 1 MG/G
CREAM TOPICAL
COMMUNITY
Start: 2021-07-29 | End: 2023-03-23

## 2021-12-07 RX ORDER — POLYETHYLENE GLYCOL 3350 17 G/17G
POWDER, FOR SOLUTION ORAL
COMMUNITY
Start: 2021-07-20

## 2021-12-07 RX ORDER — LEVETIRACETAM 750 MG/1
TABLET ORAL
COMMUNITY
Start: 2021-07-20 | End: 2021-12-07

## 2021-12-07 RX ORDER — DESONIDE 0.5 MG/G
CREAM TOPICAL
COMMUNITY
Start: 2021-07-29

## 2021-12-07 NOTE — PROGRESS NOTES
Nils is a 65 year old who is being evaluated via a billable telephone visit.      What phone number would you like to be contacted at? [phone]  How would you like to obtain your AVS? Mail a copy / Mychart  Phone call duration:  See below minutes    Chief Complaint   Patient presents with     SUSAN Veronica
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
## 2021-12-07 NOTE — PATIENT INSTRUCTIONS
"(Z01.89) Probably normal dopamine scan ([facility]SCAN). mild asymmetry noted 2019  (primary encounter diagnosis)  (G20) Parkinsonism, unspecified Parkinsonism type (H)  (primary encounter diagnosis)  (F03.91) Dementia with behavioral disturbance, unspecified dementia type (H)     Dopamine scan [facility] - 5/23/2019  A presynaptic dopaminergic deficit not present. Some asymmetry noted.   Brain MRI - nonspecific changes 3/2019  CT scan 4/7/2021 - Normal head CT    Carbidopa/levodopa Sinemet 25/100 2 tabs 3/day @ 6, noon and 11pm    Review of diagnosis    Movement - cognitive problems    Avoidance of dopamine blockers     Motor complication review   Legs are bothering him  States when he is up -     Putting in new kristian in the bathroom   Moving furniture and is hard doing this  Will get help on carpeting and moving furniture out to the garage    Has lots of shuffling      Review of Impulse control disorders     Review of surgical or medication options     Gait/Balance/Falls     Exercise/Therapy performed/offered     Cognitive/Driving   Cognitive disorder   Autoimmune vs degenerative  Donepezil aricept = not taking  He is stable    Mood   \"crabbier\" - less patience with people  Carpet being brought in  New sleep number bed     Lorazepam ativan 1mg  Paroxetine paxil 40mg at night   Both parents passed away December 2020 - 2 weeks apart  mother had dementia  Father had covid  Aunt with dementia    Has one Grand daughter and will have a second grandchild in may 2022    Hallucinations/delusions     Sleep disorder  Sleep apnea  RLS/PLMS?  Ketoprofen gel    Ropinirole requip 1mg 4/day at 6am, 12noon, 6pm and 10-11pm  Rotigotine Neupro 3mg/24 hr patch    Bladder   Urinary problems/gout  Has urinary frequency and urgency and is on the diuretic  Allopurinol zyloprim 100mg x 3 at noon    GI/Constipation/GERD   Gastric bypass  Alcohol consumption  Variable bowel function  Has had constipation and diarrhea in the past and is better " "now.  Not using ondansetron/zofran  No significant heartburn  Ondansetron zofran -not taking    ENDO  Cholecalciferol Vitamin D3 2000 units twice daily   JULIA antibody  Elevated blood sugars with A1c was 6.8 on 10/30/2020  Simvastatin zocor 40mg      Cardio/heart    Right leg swelling - right ankle issue  4/2021  Ultrasound venous right leg:  No deep venous thrombosis in the right lower extremity.  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   Has norco for pain that he uses when he needs     Baclofen 10mg lioresal 10mg twice daily  Gabapentin 800mg 3/day and has not increased to 4/day  Methadone dolophine 5mg      He has overlapping foot problem  Looks like he is \"walking on his right ankle bone\"  podiatrist and may need surgery - would need to be in a nursing home  Ongoing right ankle issue     Valacyclovir valtrex 100mg    Breathing  Has been good and not using the inhaler much if at all  Albuterol proair HFA/proventil HFA/ventolin (90 base) mcg/act inhaler  Fluticasone propionate flonase NA 50 mcg       Medications      6am 12p   6p 10/11pm     Albuterol proair HFA/proventil HFA/ventolin (90 base) mcg/act inhaler  prn           Allopurinol zyloprim 100mg   3          Amoxicillin amoxil dental           Aspirin 81mg  1           Baclofen 10mg lioresal  1     1      Carbidopa/levodopa sinemet 25/100 2  2   " 2     Cholecalciferol vitamin D3 2000 units    1  1       Clotrimazole lotrimin 1% cream  stopped           Compression stockings             Cyanocobalamin vitamin b12 1000mcg/ml injection  monthly            Diphenhydramine-zinc acetate benadryl extra strength external cream prn           Diphenhydramine HCL ex 2% topical prn           Donepezil aricept 10mg  stopped           Ferrous fumarate 65mg,[place] FE, vitamin C 125mg vitron C    1 1       Fluticasone propionate flonase NA 50 mcg prn           Furosemide lasix 40mg  2  1         Gabapentin neurontin 800mg 1 1   1     HM vitamin D3 2000 units above            Hydrocodone- acetaminophen norco 5-325mg prn           Ibuprofen advil/motrin 800mg ? taking            Iron-vitamin C 65mg iron - 125mg  above           Ketoprofen 10% cream prn           Levetiracetam keppra 500mg  3     4     Lidocaine lidodermin 5% patch prn           Lorazepam ativan 1mg       prn     Methadone dolophine 5mg        1-2     Naftifine naftin 2% gel daily           Neupro 3mg/24 hr pt24  see below           Ondansetron zofran   stopped           Paroxetine paxil 40mg        1     Potassium chloride SA Kdur Klor con 20meq CR    1         Ropinirole requip 1mg  1 1 1 1 doll   Rotigotine Neupro 3mg/24 hr patch 1       doll   Simvastatin zocor 40mg        1     Tramadol ultram 50mg stopped           Trazodone desyrel 50mg stopped           Triamcinolone kenalog 0.5% cream prn           Unable to find - diabetic shoes              Unable to fine - syringe             Unable to fine nonformulary gilmer/clon/hawa/imip/lido/pent 3-4/day            Valacyclovir valtrex 100mg prn                Plan:    He is continuing on medications for RLS  Ferrous fumarate 65mg,[place]altag FE, vitamin C 125mg vitron C   Gabapentin neurontin 800mg 3/day  Ropinirole requip 1mg 4/day   Rotigotine Neupro 3mg/24 hr patch  He may want to talk with his pcp about a recent study about dipyridamole and its b  enefit on RLS  Here are the doses that were used in the study   Dipyridamole 100mg/day x 1 week then 200mg/day x 1 week and if needed 300mg/day     He has been a bit irritable that may be related to his cognitive impairment and aggravated by mood issues as well as possibly keppra - they may want to talk with Dr. Owens about if 3500mg/day of keppra is needed or if he can get by with less.     Please review the medications and details as noted in chart.

## 2021-12-16 DIAGNOSIS — G20.C PARKINSONISM, UNSPECIFIED PARKINSONISM TYPE (H): ICD-10-CM

## 2021-12-16 RX ORDER — CARBIDOPA AND LEVODOPA 25; 100 MG/1; MG/1
TABLET ORAL
Qty: 540 TABLET | Refills: 5 | Status: SHIPPED | OUTPATIENT
Start: 2021-12-16 | End: 2023-02-01

## 2021-12-16 NOTE — TELEPHONE ENCOUNTER
Rx Authorization:  Requested Medication/ Dose: carbidopa/Levodopa 25-100MG  Date last refill ordered: 11/24/20  Quantity ordered: 540 tabs  # refills: 5  Date of last clinic visit with ordering provider: 12/7/21  Date of next clinic visit with ordering provider: F/U 1 year  All pertinent protocol data (lab date/result):   Include pertinent information from patients message:

## 2022-01-01 ENCOUNTER — HEALTH MAINTENANCE LETTER (OUTPATIENT)
Age: 67
End: 2022-01-01

## 2022-03-03 DIAGNOSIS — F03.91 DEMENTIA WITH BEHAVIORAL DISTURBANCE, UNSPECIFIED DEMENTIA TYPE: ICD-10-CM

## 2022-03-03 RX ORDER — LEVETIRACETAM 500 MG/1
TABLET ORAL
Qty: 217 TABLET | Refills: 1 | Status: SHIPPED | OUTPATIENT
Start: 2022-03-03 | End: 2022-05-05

## 2022-03-03 NOTE — TELEPHONE ENCOUNTER
Medication Refill request received for   Signed Prescriptions:                        Disp   Refills    levETIRAcetam (KEPPRA) 500 MG tablet       217 ta*1        Sig: TAKE 3 TABLETS BY MOUTH IN MORNING AND 4 TABLETS IN           EVENING (TOTAL 3500MG PER DAY)  Authorizing Provider: JUNIOR MENA  Ordering User: TOÑITO LAYNE      Last Written Prescription Date:  2/12/21  Last Fill Quantity: 270,   # refills: 11 Length of last prescription in time: one year  Last Office Visit : 2/12/21  Future Office visit:  4/22/22    [facility] Medication Refill Protocol requirements:    Results for DEREK GRACE (MRN [id number]) as of 3/3/2022 16:17   Ref. Range 9/13/2019 16:35   Keppra (Levetiracetam) Level Latest Ref Range: 12 - 46 ug/mL 43       Refill request was approved per [facility] Medication Refill Protocol requirements.

## 2022-04-23 ENCOUNTER — HEALTH MAINTENANCE LETTER (OUTPATIENT)
Age: 67
End: 2022-04-23

## 2022-05-03 DIAGNOSIS — F03.91 DEMENTIA WITH BEHAVIORAL DISTURBANCE, UNSPECIFIED DEMENTIA TYPE: ICD-10-CM

## 2022-05-05 RX ORDER — LEVETIRACETAM 500 MG/1
TABLET ORAL
Qty: 217 TABLET | Refills: 1 | Status: SHIPPED | OUTPATIENT
Start: 2022-05-05 | End: 2022-07-15

## 2022-05-05 NOTE — TELEPHONE ENCOUNTER
levETIRAcetam (KEPPRA) 500 MG tablet  TAKE 3 TABLETS BY MOUTH IN MORNING AND 4 TABLETS IN EVENING (TOTAL 3500MG PER DAY)      Last Written Prescription Date:  3/3/22  Last Fill Quantity: 217,   # refills: 1  Last Office Visit : 2/12/21  Future Office visit:  6/24/22    26 Months: If patient is > 65 yrs old, Creatinine     Routing refill request to provider for review/approval because:  Overdue visit > 14 months. Pended. Appointment on 6/24/22.     RECENT OUTSIDE LABS AVAILABLE IN THE LAB TAB OR CARE EVERYWHERE.  10/7/21   CREATININE 0.72 - 1.25 mg/dL 0.96

## 2022-07-12 DIAGNOSIS — F03.91 DEMENTIA WITH BEHAVIORAL DISTURBANCE, UNSPECIFIED DEMENTIA TYPE: ICD-10-CM

## 2022-07-15 NOTE — TELEPHONE ENCOUNTER
levETIRAcetam (KEPPRA) 500 MG tablet 217 tablet 1 5/5/2022         Last Written Prescription Date:  5-5-2022  Last Fill Quantity: 217,   # refills: 1  Last Office Visit : 2-  Future Office visit:  7-      10-7-2021 OUTSIDE LAB  Contains abnormal data BASIC METABOLIC PANEL  Order: [id number]   Ref Range & Units 9 mo ago   SODIUM 135 - 145 mmol/L 141        Routing refill request to provider for review/approval because:  NOT SEEN IN OVER A YEAR  CANCELLED LAST 2 APPOINTMENTS  HAS HAD SYMONE REFILLS  SCHEDULED FOR 7-      Kathleen M Doege RN

## 2022-07-19 RX ORDER — LEVETIRACETAM 500 MG/1
TABLET ORAL
Qty: 210 TABLET | Refills: 0 | Status: SHIPPED | OUTPATIENT
Start: 2022-07-19 | End: 2022-08-12

## 2022-08-08 DIAGNOSIS — F03.91 DEMENTIA WITH BEHAVIORAL DISTURBANCE, UNSPECIFIED DEMENTIA TYPE: ICD-10-CM

## 2022-08-12 RX ORDER — LEVETIRACETAM 500 MG/1
TABLET ORAL
Qty: 210 TABLET | Refills: 0 | Status: SHIPPED | OUTPATIENT
Start: 2022-08-12 | End: 2022-09-20

## 2022-08-12 NOTE — TELEPHONE ENCOUNTER
LEVETIRACETAM 500MG TABS  Last Written Prescription Date:   7/19/2022  Last Fill Quantity: 210,   # refills: 0  Last Office Visit :  12/7/2021  Future Office visit:   9/2/2022  210 Tabs sent to pharm to cover Pt until visit in September.       Sakina Johnson RN  Central Triage Red Flags/Med Refills

## 2022-08-13 ENCOUNTER — HEALTH MAINTENANCE LETTER (OUTPATIENT)
Age: 67
End: 2022-08-13

## 2022-08-15 DIAGNOSIS — F03.91 DEMENTIA WITH BEHAVIORAL DISTURBANCE, UNSPECIFIED DEMENTIA TYPE: ICD-10-CM

## 2022-08-17 RX ORDER — LEVETIRACETAM 500 MG/1
TABLET ORAL
Qty: 210 TABLET | Refills: 0 | OUTPATIENT
Start: 2022-08-17

## 2022-08-25 NOTE — TELEPHONE ENCOUNTER
ADMIT to observation <2 [place]    NSTEMI    BB, ASA, STATIN, hep    Morphine/Nitro    supplemental oxygen   lipid profile    Cardiology consulted    I called Dr. Justo Briscoe's office (phone [phone] fax [phone]) to see if they are willing to co-sign steroid orders, but unfortunately they are not in the office on Friday's. I left a message with our call back number for them to call us on Monday.    Gemini Macedo MS RN Care Coordinator

## 2022-09-16 DIAGNOSIS — F03.91 DEMENTIA WITH BEHAVIORAL DISTURBANCE, UNSPECIFIED DEMENTIA TYPE: ICD-10-CM

## 2022-09-20 RX ORDER — LEVETIRACETAM 500 MG/1
TABLET ORAL
Qty: 210 TABLET | Refills: 0 | Status: SHIPPED | OUTPATIENT
Start: 2022-09-20 | End: 2022-11-09

## 2022-09-20 NOTE — TELEPHONE ENCOUNTER
LEVETIRACETAM 500MG TABS Take 3 tablets (1,500 mg) by mouth every morning AND 4 tablets (2,000 mg) every evening  Last Written Prescription Date:  8/12/22  Last Fill Quantity: 210,   # refills: 0   Last Office Visit :  12/7/2021 Gabriela  Last visit Dr Owens 2/12/21  Future Office visit:   CX x 4 now has appt 10/7/22  Last Cr 10/7/21  Routing refill request to provider for review/approval because:  Last seen by Dr Owens 2/12/21( Dr Ochoa 12/7/21). Cx x 4, now has appt 10/7/22. Ok to refill?

## 2022-10-30 ENCOUNTER — HEALTH MAINTENANCE LETTER (OUTPATIENT)
Age: 67
End: 2022-10-30

## 2022-11-03 DIAGNOSIS — F03.918 DEMENTIA WITH BEHAVIORAL DISTURBANCE (H): ICD-10-CM

## 2022-11-09 NOTE — TELEPHONE ENCOUNTER
Patient is out of medication and at Pharmacy right now. They did schedule next available on 1/20/23 at [facility]. Can refill get sent asap

## 2022-11-09 NOTE — TELEPHONE ENCOUNTER
LEVETIRACETAM 500MG TABS      Last Written Prescription Date:  9-20-22  Last Fill Quantity: 210,   # refills: 0  Last Office Visit : 2-12-21 ( RTC 6 M)  Future Office visit:  1-20-23    Outside lab 10-5-22  CREATININE 0.72 - 1.25 mg/dL 0.74          Routing refill request to provider for review/approval because:  Next appt  Outside of RTC timeframe   ( Multiple cancelled appt)

## 2022-11-10 RX ORDER — LEVETIRACETAM 500 MG/1
TABLET ORAL
Qty: 217 TABLET | Refills: 0 | Status: SHIPPED | OUTPATIENT
Start: 2022-11-10 | End: 2022-12-07

## 2022-11-10 NOTE — TELEPHONE ENCOUNTER
Chart reviewed.  Multiple cancellations      Not seen since 2/12/2021  Will route to MD for review

## 2022-12-02 DIAGNOSIS — F03.918 DEMENTIA WITH BEHAVIORAL DISTURBANCE (H): ICD-10-CM

## 2022-12-07 RX ORDER — LEVETIRACETAM 500 MG/1
TABLET ORAL
Qty: 217 TABLET | Refills: 1 | Status: SHIPPED | OUTPATIENT
Start: 2022-12-07 | End: 2023-01-20

## 2022-12-07 NOTE — TELEPHONE ENCOUNTER
*  LEVETIRACETAM 500MG TABS Take 3 tablets (1,500 mg) by mouth every morning AND 4 tablets (2,000 mg) every evening  Last Written Prescription Date:  11/10/22  Last Fill Quantity: 217,   # refills: 0  Last Office Visit : 2/12/2021  Future Office visit:  1/20/23    Routing refill request to provider for review/approval because:  Multiple cancelled appt, last seen 2/12/21. Next appt 1/20/23    LEVETIRACETAM (KEPPRA) 6.0 - 46.0 ug/mL 40.0    Done at [facility] 10/3/22  10/5/22 cREATININE 0.72 - 1.25 mg/dL 0.74

## 2022-12-20 DIAGNOSIS — M79.89 LEG SWELLING: Primary | ICD-10-CM

## 2022-12-20 PROBLEM — G20.C PARKINSONISM, UNSPECIFIED PARKINSONISM TYPE (H): Status: ACTIVE | Noted: 2022-08-08

## 2022-12-20 PROBLEM — I47.29 NONSUSTAINED VENTRICULAR TACHYCARDIA (H): Status: ACTIVE | Noted: 2022-08-08

## 2022-12-20 RX ORDER — LEVETIRACETAM 1000 MG/1
1000 TABLET ORAL
COMMUNITY
Start: 2022-10-05 | End: 2023-03-23

## 2022-12-20 RX ORDER — MAGNESIUM 200 MG
TABLET ORAL
COMMUNITY
Start: 2022-10-04 | End: 2023-03-28

## 2022-12-20 RX ORDER — DOXYCYCLINE HYCLATE 100 MG
TABLET ORAL
COMMUNITY
Start: 2022-10-14 | End: 2023-03-23

## 2022-12-20 RX ORDER — ALLOPURINOL 300 MG/1
300 TABLET ORAL DAILY
COMMUNITY
End: 2023-03-28

## 2022-12-20 RX ORDER — CEPHALEXIN 500 MG/1
CAPSULE ORAL
COMMUNITY
Start: 2022-10-05 | End: 2023-03-23

## 2022-12-20 RX ORDER — IPRATROPIUM BROMIDE AND ALBUTEROL SULFATE 2.5; .5 MG/3ML; MG/3ML
1 SOLUTION RESPIRATORY (INHALATION) EVERY 4 HOURS PRN
COMMUNITY
Start: 2022-01-13

## 2022-12-20 RX ORDER — FLURBIPROFEN SODIUM 0.3 MG/ML
SOLUTION/ DROPS OPHTHALMIC
COMMUNITY
Start: 2022-10-07

## 2022-12-20 RX ORDER — ALLOPURINOL 100 MG/1
300 TABLET ORAL
COMMUNITY
Start: 2022-10-14 | End: 2023-03-23

## 2022-12-20 RX ORDER — SIMVASTATIN 40 MG
40 TABLET ORAL
COMMUNITY
Start: 2022-12-20 | End: 2023-03-23

## 2022-12-20 RX ORDER — CYANOCOBALAMIN 1000 UG/ML
1000 INJECTION, SOLUTION INTRAMUSCULAR; SUBCUTANEOUS
COMMUNITY
Start: 2022-10-07

## 2022-12-20 RX ORDER — LEVETIRACETAM 750 MG/1
1500 TABLET ORAL
COMMUNITY
Start: 2022-10-04 | End: 2023-03-23

## 2022-12-20 RX ORDER — POLYETHYLENE GLYCOL 3350 17 G/17G
17 POWDER, FOR SOLUTION ORAL
COMMUNITY
Start: 2022-08-08 | End: 2023-03-23

## 2022-12-20 RX ORDER — PREDNISONE 10 MG/1
TABLET ORAL
COMMUNITY
Start: 2022-01-07 | End: 2023-03-23

## 2022-12-20 RX ORDER — PREDNISONE 20 MG/1
TABLET ORAL
COMMUNITY
Start: 2021-12-24 | End: 2023-03-23

## 2022-12-20 RX ORDER — METFORMIN HCL 500 MG
1000 TABLET, EXTENDED RELEASE 24 HR ORAL
COMMUNITY
End: 2023-03-23

## 2022-12-20 RX ORDER — POTASSIUM CHLORIDE 1500 MG/1
20 TABLET, EXTENDED RELEASE ORAL 2 TIMES DAILY WITH MEALS
COMMUNITY
Start: 2022-05-05 | End: 2023-09-28

## 2022-12-20 RX ORDER — GABAPENTIN 800 MG/1
800 TABLET ORAL
COMMUNITY
Start: 2022-01-26 | End: 2023-03-23

## 2022-12-20 RX ORDER — CARBOXYMETHYLCELLULOSE SODIUM, GLYCERIN, AND POLYSORBATE 80 5; 10; 5 MG/ML; MG/ML; MG/ML
SOLUTION/ DROPS OPHTHALMIC
COMMUNITY
Start: 2022-01-27

## 2022-12-20 RX ORDER — SULFAMETHOXAZOLE/TRIMETHOPRIM 800-160 MG
TABLET ORAL
COMMUNITY
Start: 2022-11-02 | End: 2023-03-23

## 2022-12-20 RX ORDER — BACLOFEN 10 MG/1
1 TABLET ORAL 2 TIMES DAILY
COMMUNITY
Start: 2022-11-21 | End: 2023-03-23

## 2022-12-21 ENCOUNTER — ANCILLARY PROCEDURE (OUTPATIENT)
Dept: VASCULAR ULTRASOUND | Facility: CLINIC | Age: 67
End: 2022-12-21
Attending: NURSE PRACTITIONER
Payer: COMMERCIAL

## 2022-12-21 ENCOUNTER — OFFICE VISIT (OUTPATIENT)
Dept: VASCULAR SURGERY | Facility: CLINIC | Age: 67
End: 2022-12-21
Attending: FAMILY MEDICINE
Payer: COMMERCIAL

## 2022-12-21 VITALS
WEIGHT: 220 LBS | RESPIRATION RATE: 14 BRPM | HEART RATE: 58 BPM | SYSTOLIC BLOOD PRESSURE: 136 MMHG | BODY MASS INDEX: 33.45 KG/M2 | OXYGEN SATURATION: 95 % | DIASTOLIC BLOOD PRESSURE: 84 MMHG

## 2022-12-21 DIAGNOSIS — M79.89 LEG SWELLING: ICD-10-CM

## 2022-12-21 DIAGNOSIS — L97.922 ULCER OF LEFT LOWER EXTREMITY WITH FAT LAYER EXPOSED (H): Primary | ICD-10-CM

## 2022-12-21 PROCEDURE — G0463 HOSPITAL OUTPT CLINIC VISIT: HCPCS | Mod: 25 | Performed by: FAMILY MEDICINE

## 2022-12-21 PROCEDURE — 99204 OFFICE O/P NEW MOD 45 MIN: CPT | Performed by: FAMILY MEDICINE

## 2022-12-21 PROCEDURE — 93970 EXTREMITY STUDY: CPT

## 2022-12-21 PROCEDURE — 93970 EXTREMITY STUDY: CPT | Mod: 26 | Performed by: SURGERY

## 2022-12-21 ASSESSMENT — PAIN SCALES - GENERAL: PAINLEVEL: MODERATE PAIN (5)

## 2022-12-21 NOTE — PATIENT INSTRUCTIONS
Wound care supplies were ordered today through [employer] and if you are not receiving your supplies or have a question on your bill please contact Cynthia Jeffries at [phone]. Please allow 2-5 business days for delivery of supplies. You may get a call from a [phone] if there are additional information Mani needs. It is important to  or return their call. PLEASE NOTE: If you need to return your supplies, you MUST call customer service within 15 days of delivery date.         Wound Care Instructions    EVERY OTHER DAY and as needed, Cleanse your left shin wound(s) with Normal saline.    Pat Dry with non-sterile gauze    Apply Lotion to the intact skin surrounding your wound and other dry skin locations. Some good lotions include: Remedy Skin Repair Cream, Sarna, Vanicream or Cetaphil    Primary Dressing: Apply allevyn dressing    Compression: your compression stockings    It is ok to get your wound wet in the bath or shower    It is recommended that you elevate your legs throughout the day: approx 2-3 times each day  Elevate them above the level of your heart for 30 min.   Ways to do this:   Lay on the couch or your bed and prop your legs up on pillows   Recline back as far as you can go in your recliner and prop your legs on pillows.     Doing these things will help reduce the edema in your legs.    High Protein Foods  When you have an open ulcer, your bodies protein needs are much higher, so it is recommended eat good sources of protein or take a protein supplement!    Protein Supplements  -Premier Protein  -Ensure  -Boost  -Glucerna, if diabetic    Chicken  -Chicken breast, 3.5oz.-30 grams protein  -Chicken meat, cooked, 4 oz.    Beef  -Hamburger siobhan, 4 oz-28 grams protein  -Steak, 6 oz-42 grams  -Most cuts of beef- 7 grams of protein per ounce    Fish  -Most fish fillets or steaks are about 22 grams of protein for 3 1/2 oz(100 grams) of cooked fish, or 6 grams per ounce  -Tuna, 6 oz can-40 grams of  protein    Pork  -Pork chop, average-22 grams protein  -Pork loin or tenderloin, 4 oz.-29 grams  -Ham, 3oz serving- 19 grams  -Rose, 1 slice-3 grams    Eggs and Dairy  -Egg, large-7 grams  -Milk, 1 cup-8 grams  -Cottage cheese, 1/2 cup-15 grams  -Greek yogurt, 1 cup-usually 8-12 grams, check label    Beans  -Soy milk, 1 cup-6-10 grams  -Most beans(black, chavarria, lentils, etc.) about 7-10 grams protein per half cup of cooked beans    Nuts and Seeds  -Peanut butter, 2 Tablespoons- 8 grams protein  -Almonds, 1/4 cup- 8 grams  -Peanuts, 1/4 cup-9 grams  -Sunflower seeds, 1/4 cup- 6 grams        If for some reason you are not able to get your dressing(s) changed as outlined above (due to illness, lack of supplies, lack of help) please do the following: remove old, soiled dressings; wash the wounds with saline; pat dry; apply ABD pad or other absorbant pad and secure with rolled gauze; avoid tape directly on your skin; Call the clinic as soon as possible to let us know what the current issues are in receiving wound care [phone].      SEEK MEDICAL CARE IF:  You have an increase in swelling, pain, or redness around the wound.  You have an increase in the amount of pus coming from the wound.  There is a bad smell coming from the wound.  The wound appears to be worsening/enlarging  You have a fever greater than 101.5 F      It is ok to continue current wound care treatment/products for the next 2-3 days until new wound care supplies are ordered and arrive. If longer than this please contact our office at [phone].    If you have a 2 layer or 4 layer compression wrap on these are safe to have on for ONLY 7 days. If for some reason you are not able to get the wrap(s) changed (due to illness; lack of supplies, lack of help, lack of transportation) please do the following: unwrap the old 2 or 4 layer compression wrap; avoid using scissors as you could cut your skin and cause wounds; use tubular compression when  available. Call to reschedule your home care or clinic visit appointment as soon as possible.    Please NOTE: if you are 15 minutes late to your clinic appointment you will have to be rescheduled. Please call our clinic as soon as possible if you know you will not be able to get to your appointment at [phone].    If you fail to show up to 3 scheduled clinic appointments you will be dismissed from our clinic.              We want to hear from you!  In the next few weeks, you should receive a call or email to complete a survey about your visit at [facility]. Please help us improve your appointment experience by letting us know how we did today. We strive to make your experience good and value any ways in which we could do better.      We value your input and suggestions.    Thank you for choosing the [facility]!

## 2022-12-21 NOTE — LETTER
"[facility]  [address] [place] [address] [address]  [place], [place] [address]  [phone]      Fax [phone]    [facility]           Fax: [phone]            Customer Service: [phone]        Account #: [id number]    Wound Dressing Rx and Order Form  Order Status: new  Verbal: Zara  Date: 2022     Jose Loco  Gender: male  : 1955  [address] [facility] [id number] [phone] (home)     Medical Record: [id number]  Primary Care Provider: Jacob Monson      ICD-10-CM    1. Ulcer of left lower extremity with fat layer exposed (H)  L97.922 Vascular Surgery Referral     Wound care            Insurance Info:  INSURER: Payor: [employer] / Plan: [employer] MEDICARE ADVANTAGE / Product Type: HMO /   Policy ID#:  [id number]  SECONDARY INSURANCE:    Secondary Policy ID#:  N/A        Physician Info:   Name:  MICHAEL CAMILO     Dept Address/Phones:   [address] [place] [address], [address] [address] [phone]  Fax: [phone]    Lymphedema circumferential measurements (in cm):  No flowsheet data found.      Wound info:  Wound 19 Left Other (Comment) Abrasion(s) On pinky toe: Patient has had for months- unsure what happened (Active)   Number of days: 1200       VASC Wound Left shin (Active)   Pre Size Length 7.2 22 1300   Pre Size Width 5.2 22 1300   Pre Size Depth 0.1 22 1300   Pre Total Sq cm 37.44 22 1300   Description scattered 22 1300   Number of days: 0        Drainage: moderate  Thickness:  full  Duration of Need: 30 DAYS  Days Supply: 30 DAYS  Start Date: 2022  Starter Kit: Ancillary Kit (saline, gloves, gauze)  Qualifying wound/Debridement: Yes      Dressing Type Brand Size Frequency of change -or- Quantity   Primary allevyn gentle border adhesive bandage  5\"x5\" EVERY OTHER DAY and as needed     No substitutions preferred. Call [phone].         OK to forward to covered " supplier.    Electronically Signed Physician:  MICHAEL CAMILO             Date: December 21, 2022

## 2022-12-21 NOTE — PROGRESS NOTES
Wound Clinic Note         Visit date: 12/21/2022       Cheif Complaint:     Jose Loco is a 67 year old male had concerns including BLE cellulitis .  The patient has left leg ulcers.          HISTORY OF PRESENT ILLNESS:    Jose Loco reports the wound has been present since mid December 2022.  The wound began as a water blister that ruptured.   He previously developed bilateral lower extremity cellulitis and has completed taking all antibiotics at this time with no symptoms of an adverse reaction.  His wife confirms that all symptoms of the cellulitis have resolved.  The patient denies a history of congestive heart failure, previous vein procedures or previous DVT.       The patient has been bandaging the wound with a Band-Aid and a Tubigrip stocking changed every other day.  There has been Light serous  drainage from the wound.       The pateint denies fevers or chills.  They report the pain from the wound has been 0/10 and has remained about the same recently.      The patient reports they currently do not have any routine for elevating their legs.     Today the patient reports maintaining a regular diet without special attention to protein.        The patient denies a history of smoking or chronic steroid use.  and The patient confirms having diabetes and reports the blood sugars are well controlled.         The patient has recently had some symptoms of wound infection and is currently taking antibiotics which they have been tolerating well with no symptoms of an adverse reaction.       Problem List:   Past Medical History:   Diagnosis Date     ACP (advance care planning) 11/29/2012    Formatting of this note might be different from the original. Brochure given     Alcoholism (H) 7/25/2011    Overview:  No use since 2011.  Formatting of this note might be different from the original. No use since 2011.     Altered behavior 9/5/2019     Altered mental status 9/5/2019     Ataxia  1/15/2014    Overview:  Falls  Formatting of this note might be different from the original. 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 Complex medical disorder with encephalopathy, ROSY, intermittent paralyses. Evaluation unremarkalble except for elevation of paraneoplastic antibodies, followed by neuroimmunology. MRI at [place] reportedly normal, EEG here with generalized epileptiform abnormalities. LEV started with cessation of spells. Breakthru Aug 2017, levetiracetam increase     Dehydration 1/15/2014    Formatting of this note is different from the original. CREATININE                (mg/dL)  Date Value  1/15/2014 1.88*   (previous Creatinine 1.2)     Dementia (H) 1/15/2014    Overview:  March 2013: abnormal neuropsych testing at [facility]. 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 March 2013: abnormal neuropsych testing at [facility]. 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  HEMOGLOBIN A1C MONITORING (POCT) (% Total Hgb)  Date Value  9/5/2013 6.0   3/20/2013 6.7*  9/26/2012 5.7    Overview:  Formatting of this note might be different from the original.  HEMOGLOBIN A1C MONITORING (POCT) (% Total Hgb)  Date Value  9/5/2013 6.0   3/20/2013 6.7*  9/26/2012 5.7    Formatting of this note is different f     Diabetes mellitus without complication (H) 4/5/2006     Displacement of cervical intervertebral disc without myelopathy 10/5/2018    Overview:  2002 fall with disk herniation at C 6-7, cervical fusion.  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  Formatting of this note might be different from the original. 2002 fall with disk herniation at C 6-7, cervical fusion. 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     Edema 7/25/2011     Edema due to malnutrition, due to unspecified malnutrition type (H) 8/2/2021     Encephalopathy 12/10/2015     Gout, unspecified 6/8/2011     HTN (hypertension) 5/10/2012    Overview:  Lisinopril  Formatting of this note might be different from the original. Lisinopril     Hyperlipidemia 1/9/2018     Knee pain, bilateral 7/16/2011     Memory loss 8/18/2015     Muscle spasm of both lower legs 10/30/2014     Neuropathy 7/16/2011    Overview:  Gabapentin  Formatting of this note might be different from the original. Gabapentin     Nonsustained ventricular tachycardia 8/8/2022     Obesity 1/15/2014    Overview:  1/15/2014 Body mass index is 36.5 kg/(m^2).  Formatting of this note might be different from the original. 1/15/2014 Body mass index is 36.5 kg/(m^2).     ROSY (obstructive sleep apnea) 12/5/2012    Overview:  [facility] 11-28-12.  AHI 6.2  RDI 6.2  AHI REM 53.3  Oxygen Narayan 66%  Formatting of this note might be different from the original. [facility] 11-28-12.  AHI 6.2  RDI 6.2  AHI REM 53.3  Oxygen Narayan 66%     Pain medication agreement 6/7/2011    Overview:  Pain Clinic: MS Contin and Vicodin. 120 vicodin a month.  Formatting of this note might be different from the original. Pain Clinic: MS Contin and Vicodin. 120 vicodin a month.     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 mild asymmetry noted 2019 5/24/2019     Restless legs syndrome (RLS) 6/9/2008     Right inguinal hernia 7/19/2021     Routine  general medical examination at a health care facility 10/5/2018    Overview:  Colonoscopy Bone Denisty Last Lipids: Chol: 162    10/24/2006 T    10/24/2006 HDL:   32    10/24/2006 LDL:  90    10/24/2006 GLUCOSE                   (mg/dL)  Date             Value  2007          99  ---------- No results found for this basename:  PSASCREEN,PSADIAGNOSTI  Formatting of this note might be different from the original. Colonoscopy Bone Denisty Last Lipids: C     Seizures (H)      Sepsis (H) 2021     Substance abuse (H)      Umbilical hernia without obstruction and without gangrene 2021             Family Hx: family history includes Brain Cancer in an other family member; Cerebrovascular Disease in his father; Dementia in his father, mother, and another family member; Hearing Loss in his father; Memory loss in his father; Multiple births in his son and son; Other - See Comments in his father and mother; Prostate Cancer in his father.       Surgical Hx:   Past Surgical History:   Procedure Laterality Date     Cervical Cord Decompression       GI SURGERY      gastric bypass surgery     HIP SURGERY Left     left hip surgery     KNEE SURGERY Left 2008    left knee arthroplasty          Allergies:    Allergies   Allergen Reactions     Seasonal Allergies Itching, Rash and Visual Disturbance              Medication History:    Current Outpatient Medications   Medication Sig     albuterol (PROAIR HFA/PROVENTIL HFA/VENTOLIN HFA) 108 (90 Base) MCG/ACT inhaler Inhale 1-2 puffs into the lungs every 4 hours as needed 1-2 puffs every 4 hours prn.     allopurinol (ZYLOPRIM) 100 MG tablet Take 300 mg by mouth     allopurinol (ZYLOPRIM) 100 MG tablet 3 x 100mg tab by mouth daily at noon     allopurinol (ZYLOPRIM) 300 MG tablet Take 300 mg by mouth     amoxicillin (AMOXIL) 500 MG tablet ONLY FOR DENTAL APPOINTMENT: Reported on 3/21/2017     aspirin 81 MG tablet Take by mouth daily     B-D U/F insulin pen needle USE  AS DIRECTED WITH B12     baclofen (LIORESAL) 10 MG tablet Take 1 tablet by mouth 2 times daily     baclofen (LIORESAL) 10 MG tablet 10mg tab by mouth twice daily @ 6am and 10pm     carbidopa-levodopa (SINEMET)  MG tablet TAKE TWO TABLETS BY MOUTH THREE TIMES A DAY (AT 6AM, 12NOON, AND 11PM)     cephALEXin (KEFLEX) 500 MG capsule      cholecalciferol (HM VITAMIN D3) 50 MCG (2000 UT) CAPS 2000 unit capsule by mouth twice daily @ noon and 6pm     clotrimazole (LOTRIMIN) 1 % external cream Apply topically daily as needed (feet) Reported on 3/21/2017     COMPRESSION STOCKINGS 1 each     cyanocobalamin (CYANOCOBALAMIN) 1000 MCG/ML injection Inject 1,000 mcg into the muscle every 28 days     cyanocobalamin (VITAMIN B12) 1000 MCG/ML injection Inject 1 mL into the muscle every 30 days     desonide (DESOWEN) 0.05 % external cream      diclofenac (VOLTAREN) 1 % topical gel APPLY 2 GRAMS TO AFFECTED AREA(S) FOUR TIMES DAILY     diphenhydrAMINE-zinc acetate (BENADRYL) 2-0.1 % external cream      doxycycline hyclate (VIBRA-TABS) 100 MG tablet TAKE ONE TABLET BY MOUTH TWICE A DAY FOR 7 DAYS     ferrous fumarate 65 mg ([place]. FE)-Vitamin C 125 mg (VITRON C)  MG TABS tablet 1 tab by mouth twice daily @ noon and 6pm     FLUoxetine (PROZAC) 20 MG capsule Take 20 mg by mouth every morning     fluticasone (FLONASE) 50 MCG/ACT nasal spray As needed     furosemide (LASIX) 40 MG tablet 2 x 40mg tab by mouth at 6am and 40mg tab by mouth at noon = 3/day     gabapentin (NEURONTIN) 800 MG tablet Take 800 mg by mouth     gabapentin (NEURONTIN) 800 MG tablet 800mg tab by mouth 3/day @ 6am, 12noon, 11pm     PAM-KAMRON 8.6 MG tablet TAKE ONE TO TWO TABLETS BY MOUTH TWICE A DAY AS NEEDED     HYDROcodone-acetaminophen (NORCO) 5-325 MG per tablet as needed     ipratropium - albuterol 0.5 mg/2.5 mg/3 mL (DUONEB) 0.5-2.5 (3) MG/3ML neb solution INHALE THREE MLS BY MOUTH EVERY 6 HOURS AS NEEDED FOR WHEEZING     Ketoprofen 10 % CREA Externally  apply 1 Tube topically 2 times daily     levETIRAcetam (KEPPRA) 1000 MG tablet Take 1,000 mg by mouth     levETIRAcetam (KEPPRA) 500 MG tablet TAKE 3 TABLETS BY MOUTH EVERY MORNING AND 4 TABLETS IN EVENING     levETIRAcetam (KEPPRA) 750 MG tablet Take 1,500 mg by mouth     lidocaine (LIDODERM) 5 % patch Place 1 patch onto the skin daily as needed for moderate pain Reported on 3/21/2017     LORazepam (ATIVAN) 1 MG tablet 1mg tab by mouth nightly as needed     magnesium 200 MG TABS Take 200 mg by mouth     metFORMIN (GLUCOPHAGE XR) 500 MG 24 hr tablet Take 1,000 mg by mouth     metFORMIN (GLUCOPHAGE-XR) 500 MG 24 hr tablet START WITH TAKE ONE TABLET BY MOUTH EVERY DAY WITH SUPPER FOR TWO WEEKS THEN TAKE TWO TABLETS BY MOUTH EVERY DAY WITH SUPPER     methadone (DOLOPHINE) 5 MG tablet 1-2 x 5mg tab by mouth nightly     MIRALAX 17 g packet MIX AND DRINK 17 GRAMS BY MOUTH OR NG-TUBE ONCE DAILY AS NEEDED     Naftifine (NAFTIN) 2 % GEL Apply topically daily Reported on 3/21/2017     other medical supplies Cane (device) For home use. X 12 weeks.     PARoxetine (PAXIL) 40 MG tablet Take 1 tablet (40 mg) by mouth At Bedtime     polyethylene glycol (MIRALAX) 17 GM/Dose powder Take 17 g by mouth     potassium chloride ER (K-DUR/KLOR-CON M) 20 MEQ CR tablet 20meq tab by mouth daily @ noon     potassium chloride ER (K-TAB) 20 MEQ CR tablet Take 20 mEq by mouth 2 times daily (with meals)     predniSONE (DELTASONE) 10 MG tablet TAKE 4 TABS. DAILY X 3 DAYS, THEN 3 TABS. X 3 DAYS, THEN 2 TABS. DAILY X 3 DAYS, THEN 1 TAB. X 3 DAYS     predniSONE (DELTASONE) 20 MG tablet      REFRESH OPTIVE ADVANCED 0.5-1-0.5 % SOLN PLACE ONE DROP INTO THE EYE(S) 5 TIMES DAILY AS NEEDED FOR DRY EYES     rOPINIRole (REQUIP) 1 MG tablet 1mg tab by mouth 4/day @ 6am, 12noon, 6pm, and 10-11pm     rotigotine (NEUPRO) 3 MG/24HR 24 hr patch Place 1 patch onto the skin daily     simvastatin (ZOCOR) 40 MG tablet Take 40 mg by mouth     simvastatin (ZOCOR) 40 MG  "tablet 40mg tab by mouth at bedtime     sulfamethoxazole-trimethoprim (BACTRIM DS) 800-160 MG tablet TAKE ONE TABLET BY MOUTH TWICE A DAY FOR 10 DAYS     traMADol (ULTRAM) 50 MG tablet TAKE ONE-HALF TO ONE TABLET BY MOUTH THREE TIMES A DAY AS NEEDED FOR PAIN     triamcinolone (KENALOG) 0.1 % external cream      triamcinolone (KENALOG) 0.5 % cream Apply topically 3 times daily Reported on 3/21/2017     UNABLE TO FIND nonformulary  P7 - CROW/CLON/GEORGETTE/IMIP/LIDO/PENT - 10/0.2/6/3/5/3% Apply 1-2 gms to affected area 3-4x per day. Rub in for 1-2 minutes.     UNABLE TO FIND Diabetic shoes DX: Diabetic Neuropathy     UNABLE TO FIND Syringe w/ Needle, Disposable - 3 ML (syringe) 22 x 1\" As directed.     valACYclovir (VALTREX) 1000 mg tablet Take 1 tablet (1,000 mg) by mouth daily as needed     amoxicillin-clavulanate (AUGMENTIN) 875-125 MG tablet TAKE ONE TABLET BY MOUTH TWICE A DAY WITH MEALS FOR 10 DAYS (Patient not taking: Reported on 12/21/2022)     No current facility-administered medications for this visit.         Tobacco History:  reports that he has never smoked. He has never used smokeless tobacco.       REVIEW OF SYMPTOMS:   The review of systems was negative except as noted in the HPI.           PHYSICAL EXAMINATION:     /84   Pulse 58   Resp 14   Wt 220 lb (99.8 kg)   SpO2 95%   BMI 33.45 kg/m             GENERAL: The patient overall appears well and is no acute distress.   HEAD: normocephalic   EYES: Sclera and conjunctiva clear   NECK: no obvious masses   LUNGS: breathing is unlabored.   EXTREMITIES: No clubbing, cyanosis or edema   SKIN: No rashes or other abnormalities except as noted under the Wound section below.   NEUROLOGICAL: normal motor and sensory function   EDEMA: Moderate       WOUND: The wound appears healthy with no sign of infection.   Wound bed: granulation tissue  Periwound: healthy intact skin  A number of fairly superficial wounds on the left anterior shin consistent with ruptured " water blisters.      Also see below for wound details:       Circumferential volume measures:        No flowsheet data found.    Ulceration(s)/Wound(s):   Please see the media tab under the chart review for pictures of the wounds.    VASC Wound Left shin (Active)   Pre Size Length 7.2 12/21/22 1300   Pre Size Width 5.2 12/21/22 1300   Pre Size Depth 0.1 12/21/22 1300   Pre Total Sq cm 37.44 12/21/22 1300   Description scattered 12/21/22 1300             No results for input(s): HGBA1C, A1C, EAG in the last 39450 hours.    Invalid input(s): [id number]       Recent Labs   Lab Test 09/05/19  0554 09/05/19  0116   ALBUMIN 3.6 3.5              No debridement performed today.                  ASSESSMENT:   This is a 67 year old male with left leg ulcers.           PLAN:   A Mepilex and his compression stockings changed every other day.  I explained to the patient today that controlling the edema is probably the most important thing we can do to help heal the wound.  I have specially recommended that they lay down with their legs above the level of the heart for 30 minutes at least twice a day.   I have explained to the patient the importance of protein intake to wound healing.  I have explained that increasing protein intake will speed wound healing.  We discussed several types of food that are high in protein and the wound care nurse gave the patient a handout that summarizes this information.  In addition to further speed wound healing I have encouraged the patient to take a protein supplement.    Just earlier today he had a bilateral lower extremity venous insufficiency ultrasound performed which did show an area of venous insufficiency on the left side where his wound is.  I recommended that they meet with a provider to discuss treatment options for the venous insufficiency.  The patient was in agreement with this and I have entered a referral.  The patient will return to the wound clinic in 2 weeks to see me again.         45 minutes spent on the date of the encounter doing chart review, history and exam, documentation and further activities per the note      Stanton Light MD  12/21/2022   1:34 PM   [facility]  [phone]

## 2023-01-12 ENCOUNTER — OFFICE VISIT (OUTPATIENT)
Dept: VASCULAR SURGERY | Facility: CLINIC | Age: 68
End: 2023-01-12
Attending: FAMILY MEDICINE
Payer: COMMERCIAL

## 2023-01-12 VITALS — HEART RATE: 60 BPM | DIASTOLIC BLOOD PRESSURE: 72 MMHG | SYSTOLIC BLOOD PRESSURE: 130 MMHG | RESPIRATION RATE: 20 BRPM

## 2023-01-12 DIAGNOSIS — L97.922 ULCER OF LEFT LOWER EXTREMITY WITH FAT LAYER EXPOSED (H): Primary | ICD-10-CM

## 2023-01-12 PROCEDURE — G0463 HOSPITAL OUTPT CLINIC VISIT: HCPCS | Performed by: FAMILY MEDICINE

## 2023-01-12 PROCEDURE — 99214 OFFICE O/P EST MOD 30 MIN: CPT | Performed by: FAMILY MEDICINE

## 2023-01-12 ASSESSMENT — PAIN SCALES - GENERAL: PAINLEVEL: MILD PAIN (3)

## 2023-01-12 NOTE — PATIENT INSTRUCTIONS
Wound care supplies were not ordered or needed today.      Please call to to schedule your next appointment - in about 2 weeks    Wound Care Instructions    EVERY OTHER DAY and as needed, Cleanse your left shin wound(s) with Normal saline.    Pat Dry with non-sterile gauze    Apply Lotion to the intact skin surrounding your wound and other dry skin locations. Some good lotions include: Remedy Skin Repair Cream, Sarna, Vanicream or Cetaphil    Primary Dressing: Apply mepilex dressings    Compression: your compression stockings    It is ok to get your wound wet in the bath or shower    It is recommended that you elevate your legs throughout the day: approx 2-3 times each day  Elevate them above the level of your heart for 30 min.   Ways to do this:   Lay on the couch or your bed and prop your legs up on pillows   Recline back as far as you can go in your recliner and prop your legs on pillows.     Doing these things will help reduce the edema in your legs.        If for some reason you are not able to get your dressing(s) changed as outlined above (due to illness, lack of supplies, lack of help) please do the following: remove old, soiled dressings; wash the wounds with saline; pat dry; apply ABD pad or other absorbant pad and secure with rolled gauze; avoid tape directly on your skin; Call the clinic as soon as possible to let us know what the current issues are in receiving wound care [phone].      SEEK MEDICAL CARE IF:  You have an increase in swelling, pain, or redness around the wound.  You have an increase in the amount of pus coming from the wound.  There is a bad smell coming from the wound.  The wound appears to be worsening/enlarging  You have a fever greater than 101.5 F      It is ok to continue current wound care treatment/products for the next 2-3 days until new wound care supplies are ordered and arrive. If longer than this please contact our office at [phone].    If you have a 2 layer or 4  layer compression wrap on these are safe to have on for ONLY 7 days. If for some reason you are not able to get the wrap(s) changed (due to illness; lack of supplies, lack of help, lack of transportation) please do the following: unwrap the old 2 or 4 layer compression wrap; avoid using scissors as you could cut your skin and cause wounds; use tubular compression when available. Call to reschedule your home care or clinic visit appointment as soon as possible.    Please NOTE: if you are 15 minutes late to your clinic appointment you will have to be rescheduled. Please call our clinic as soon as possible if you know you will not be able to get to your appointment at [phone].    If you fail to show up to 3 scheduled clinic appointments you will be dismissed from our clinic.              We want to hear from you!  In the next few weeks, you should receive a call or email to complete a survey about your visit at [facility]. Please help us improve your appointment experience by letting us know how we did today. We strive to make your experience good and value any ways in which we could do better.      We value your input and suggestions.    Thank you for choosing the [facility]!

## 2023-01-12 NOTE — PROGRESS NOTES
Wound Clinic Note         Visit date: 01/12/2023       Che Complaint:     Jose Loco is a 67 year old male had concerns including left leg wound .  The patient has left leg ulcers.          HISTORY OF PRESENT ILLNESS:    Jose Loco reports the wound has been present since mid December 2022.  The wound began as a water blister that ruptured.   He previously developed bilateral lower extremity cellulitis and has completed taking all antibiotics at this time with no symptoms of an adverse reaction.  His wife confirms that all symptoms of the cellulitis have resolved.  The patient denies a history of congestive heart failure, previous vein procedures or previous DVT.       Since his last clinic visit with me he has been bandaging the wound with Mepilex and his compression stocking changed every other day.  He reports actually the wound that he had previously is healed however he bumped the left leg and accidentally scratched the area while he was sleeping and caused 2 new wounds to develop.  Been light serous drainage from these wounds.      The patient is now scheduled to see Dr. Salinas on January 25, 2023 to discuss treatment options for his venous insufficiency.      The pateint denies fevers or chills.  They report the pain from the wound has been 0/10 and has remained about the same recently.      The patient reports propping up their legs occasionally during the day, but they do not elevate their legs above the level of their heart.     Today the patient reports maintaining a high protein diet, but has not been taking protein supplements lately.        The patient denies a history of smoking or chronic steroid use.  and The patient confirms having diabetes and reports the blood sugars are well controlled.         The patient has not had any symptoms of infection relating to the wound recently and is not currently on antibiotics.       Problem List:   Past Medical History:    Diagnosis Date     ACP (advance care planning) 11/29/2012    Formatting of this note might be different from the original. Brochure given     Alcoholism (H) 7/25/2011    Overview:  No use since 2011.  Formatting of this note might be different from the original. No use since 2011.     Altered behavior 9/5/2019     Altered mental status 9/5/2019     Ataxia 1/15/2014    Overview:  Falls  Formatting of this note might be different from the original. 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 Complex medical disorder with encephalopathy, ROSY, intermittent paralyses. Evaluation unremarkalble except for elevation of paraneoplastic antibodies, followed by neuroimmunology. MRI at [facility] reportedly normal, EEG here with generalized epileptiform abnormalities. LEV started with cessation of spells. Breakthru Aug 2017, levetiracetam increase     Dehydration 1/15/2014    Formatting of this note is different from the original. CREATININE                (mg/dL)  Date Value  1/15/2014 1.88*   (previous Creatinine 1.2)     Dementia (H) 1/15/2014    Overview:  March 2013: abnormal neuropsych testing at [facility]. 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 March 2013: abnormal neuropsych testing at [facility]. 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  HEMOGLOBIN A1C MONITORING (POCT) (% Total Hgb)  Date Value  9/5/2013 6.0   3/20/2013 6.7*  9/26/2012 5.7    Overview:  Formatting of this note might be different from the  original.  HEMOGLOBIN A1C MONITORING (POCT) (% Total Hgb)  Date Value  9/5/2013 6.0   3/20/2013 6.7*  9/26/2012 5.7    Formatting of this note is different f     Diabetes mellitus without complication (H) 4/5/2006     Displacement of cervical intervertebral disc without myelopathy 10/5/2018    Overview:  2002 fall with disk herniation at C 6-7, cervical fusion. 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  Formatting of this note might be different from the original. 2002 fall with disk herniation at C 6-7, cervical fusion. 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     Edema 7/25/2011     Edema due to malnutrition, due to unspecified malnutrition type (H) 8/2/2021     Encephalopathy 12/10/2015     Gout, unspecified 6/8/2011     HTN (hypertension) 5/10/2012    Overview:  Lisinopril  Formatting of this note might be different from the original. Lisinopril     Hyperlipidemia 1/9/2018     Knee pain, bilateral 7/16/2011     Memory loss 8/18/2015     Muscle spasm of both lower legs 10/30/2014     Neuropathy 7/16/2011    Overview:  Gabapentin  Formatting of this note might be different from the original. Gabapentin     Nonsustained ventricular tachycardia 8/8/2022     Obesity 1/15/2014    Overview:  1/15/2014 Body mass index is 36.5 kg/(m^2).  Formatting of this note might be different from the original. 1/15/2014 Body mass index is 36.5 kg/(m^2).     ROSY (obstructive sleep apnea) 12/5/2012    Overview:  [facility] 11-28-12.  AHI 6.2  RDI 6.2  AHI REM 53.3  Oxygen Narayan 66%  Formatting of this note might be different from the original. [facility] 11-28-12.  AHI 6.2  RDI 6.2  AHI REM 53.3  Oxygen Narayan 66%     Pain medication agreement 6/7/2011    Overview:  Pain Clinic: MS Contin and Vicodin. 120 vicodin a month.  Formatting of this note might be different from the original. Pain Clinic: MS Contin and Vicodin. 120 vicodin a month.     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 mild asymmetry noted 2019     Restless legs syndrome (RLS) 2008     Right inguinal hernia 2021     Routine general medical examination at a health care facility 10/5/2018    Overview:  Colonoscopy Bone Denisty Last Lipids: Chol: 162    10/24/2006 T    10/24/2006 HDL:   32    10/24/2006 LDL:  90    10/24/2006 GLUCOSE                   (mg/dL)  Date             Value  2007          99  ---------- No results found for this basename:  PSASCREEN,PSADIAGNOSTI  Formatting of this note might be different from the original. Colonoscopy [facility]isty Last Lipids: C     Seizures (H)      Sepsis (H) 2021     Substance abuse (H)      Umbilical hernia without obstruction and without gangrene 2021             Family Hx: family history includes Brain Cancer in an other family member; Cerebrovascular Disease in his father; Dementia in his father, mother, and another family member; Hearing Loss in his father; Memory loss in his father; Multiple births in his son and son; Other - See Comments in his father and mother; Prostate Cancer in his father.       Surgical Hx:   Past Surgical History:   Procedure Laterality Date     Cervical Cord Decompression  2002     GI SURGERY      gastric bypass surgery     HIP SURGERY Left     left hip surgery     KNEE SURGERY Left 2008    left knee arthroplasty          Allergies:    Allergies   Allergen Reactions     Seasonal Allergies Itching, Rash and Visual Disturbance              Medication History:    Current Outpatient Medications   Medication Sig     albuterol (PROAIR HFA/PROVENTIL HFA/VENTOLIN HFA) 108 (90 Base) MCG/ACT inhaler Inhale 1-2 puffs into the lungs every 4 hours as needed 1-2 puffs every 4 hours prn.      allopurinol (ZYLOPRIM) 100 MG tablet Take 300 mg by mouth     allopurinol (ZYLOPRIM) 100 MG tablet 3 x 100mg tab by mouth daily at noon     allopurinol (ZYLOPRIM) 300 MG tablet Take 300 mg by mouth     amoxicillin (AMOXIL) 500 MG tablet ONLY FOR DENTAL APPOINTMENT: Reported on 3/21/2017     amoxicillin-clavulanate (AUGMENTIN) 875-125 MG tablet      aspirin 81 MG tablet Take by mouth daily     B-D U/F insulin pen needle USE AS DIRECTED WITH B12     baclofen (LIORESAL) 10 MG tablet Take 1 tablet by mouth 2 times daily     baclofen (LIORESAL) 10 MG tablet 10mg tab by mouth twice daily @ 6am and 10pm     carbidopa-levodopa (SINEMET)  MG tablet TAKE TWO TABLETS BY MOUTH THREE TIMES A DAY (AT 6AM, 12NOON, AND 11PM)     cephALEXin (KEFLEX) 500 MG capsule      cholecalciferol (HM VITAMIN D3) 50 MCG (2000 UT) CAPS 2000 unit capsule by mouth twice daily @ noon and 6pm     clotrimazole (LOTRIMIN) 1 % external cream Apply topically daily as needed (feet) Reported on 3/21/2017     COMPRESSION STOCKINGS 1 each     cyanocobalamin (CYANOCOBALAMIN) 1000 MCG/ML injection Inject 1,000 mcg into the muscle every 28 days     cyanocobalamin (VITAMIN B12) 1000 MCG/ML injection Inject 1 mL into the muscle every 30 days     desonide (DESOWEN) 0.05 % external cream      diclofenac (VOLTAREN) 1 % topical gel APPLY 2 GRAMS TO AFFECTED AREA(S) FOUR TIMES DAILY     diphenhydrAMINE-zinc acetate (BENADRYL) 2-0.1 % external cream      doxycycline hyclate (VIBRA-TABS) 100 MG tablet TAKE ONE TABLET BY MOUTH TWICE A DAY FOR 7 DAYS     ferrous fumarate 65 mg (Kaw. FE)-Vitamin C 125 mg (VITRON C)  MG TABS tablet 1 tab by mouth twice daily @ noon and 6pm     FLUoxetine (PROZAC) 20 MG capsule Take 20 mg by mouth every morning     fluticasone (FLONASE) 50 MCG/ACT nasal spray As needed     furosemide (LASIX) 40 MG tablet 2 x 40mg tab by mouth at 6am and 40mg tab by mouth at noon = 3/day     gabapentin (NEURONTIN) 800 MG tablet Take 800 mg by  mouth     gabapentin (NEURONTIN) 800 MG tablet 800mg tab by mouth 3/day @ 6am, 12noon, 11pm     PAM-KAMRON 8.6 MG tablet TAKE ONE TO TWO TABLETS BY MOUTH TWICE A DAY AS NEEDED     HYDROcodone-acetaminophen (NORCO) 5-325 MG per tablet as needed     ipratropium - albuterol 0.5 mg/2.5 mg/3 mL (DUONEB) 0.5-2.5 (3) MG/3ML neb solution INHALE THREE MLS BY MOUTH EVERY 6 HOURS AS NEEDED FOR WHEEZING     Ketoprofen 10 % CREA Externally apply 1 Tube topically 2 times daily     levETIRAcetam (KEPPRA) 1000 MG tablet Take 1,000 mg by mouth     levETIRAcetam (KEPPRA) 500 MG tablet TAKE 3 TABLETS BY MOUTH EVERY MORNING AND 4 TABLETS IN EVENING     levETIRAcetam (KEPPRA) 750 MG tablet Take 1,500 mg by mouth     lidocaine (LIDODERM) 5 % patch Place 1 patch onto the skin daily as needed for moderate pain Reported on 3/21/2017     LORazepam (ATIVAN) 1 MG tablet 1mg tab by mouth nightly as needed     magnesium 200 MG TABS Take 200 mg by mouth     metFORMIN (GLUCOPHAGE XR) 500 MG 24 hr tablet Take 1,000 mg by mouth     metFORMIN (GLUCOPHAGE-XR) 500 MG 24 hr tablet START WITH TAKE ONE TABLET BY MOUTH EVERY DAY WITH SUPPER FOR TWO WEEKS THEN TAKE TWO TABLETS BY MOUTH EVERY DAY WITH SUPPER     methadone (DOLOPHINE) 5 MG tablet 1-2 x 5mg tab by mouth nightly     MIRALAX 17 g packet MIX AND DRINK 17 GRAMS BY MOUTH OR NG-TUBE ONCE DAILY AS NEEDED     Naftifine (NAFTIN) 2 % GEL Apply topically daily Reported on 3/21/2017     other medical supplies Cane (device) For home use. X 12 weeks.     PARoxetine (PAXIL) 40 MG tablet Take 1 tablet (40 mg) by mouth At Bedtime     polyethylene glycol (MIRALAX) 17 GM/Dose powder Take 17 g by mouth     potassium chloride ER (K-DUR/KLOR-CON M) 20 MEQ CR tablet 20meq tab by mouth daily @ noon     potassium chloride ER (K-TAB) 20 MEQ CR tablet Take 20 mEq by mouth 2 times daily (with meals)     predniSONE (DELTASONE) 10 MG tablet TAKE 4 TABS. DAILY X 3 DAYS, THEN 3 TABS. X 3 DAYS, THEN 2 TABS. DAILY X 3 DAYS, THEN  "1 TAB. X 3 DAYS     predniSONE (DELTASONE) 20 MG tablet      REFRESH OPTIVE ADVANCED 0.5-1-0.5 % SOLN PLACE ONE DROP INTO THE EYE(S) 5 TIMES DAILY AS NEEDED FOR DRY EYES     rOPINIRole (REQUIP) 1 MG tablet 1mg tab by mouth 4/day @ 6am, 12noon, 6pm, and 10-11pm     rotigotine (NEUPRO) 3 MG/24HR 24 hr patch Place 1 patch onto the skin daily     simvastatin (ZOCOR) 40 MG tablet Take 40 mg by mouth     simvastatin (ZOCOR) 40 MG tablet 40mg tab by mouth at bedtime     sulfamethoxazole-trimethoprim (BACTRIM DS) 800-160 MG tablet TAKE ONE TABLET BY MOUTH TWICE A DAY FOR 10 DAYS     traMADol (ULTRAM) 50 MG tablet TAKE ONE-HALF TO ONE TABLET BY MOUTH THREE TIMES A DAY AS NEEDED FOR PAIN     triamcinolone (KENALOG) 0.1 % external cream      triamcinolone (KENALOG) 0.5 % cream Apply topically 3 times daily Reported on 3/21/2017     UNABLE TO FIND nonformulary  P7 - CROW/CLON/GEORGETTE/IMIP/LIDO/PENT - 10/0.2/6/3/5/3% Apply 1-2 gms to affected area 3-4x per day. Rub in for 1-2 minutes.     UNABLE TO FIND Diabetic shoes DX: Diabetic Neuropathy     UNABLE TO FIND Syringe w/ Needle, Disposable - 3 ML (syringe) 22 x 1\" As directed.     valACYclovir (VALTREX) 1000 mg tablet Take 1 tablet (1,000 mg) by mouth daily as needed     No current facility-administered medications for this visit.         Tobacco History:  reports that he has never smoked. He has never used smokeless tobacco.       REVIEW OF SYMPTOMS:   The review of systems was negative except as noted in the HPI.           PHYSICAL EXAMINATION:     /72   Pulse 60   Resp 20            GENERAL: The patient overall appears well and is no acute distress.   HEAD: normocephalic   EYES: Sclera and conjunctiva clear   NECK: no obvious masses   LUNGS: breathing is unlabored.   EXTREMITIES: No clubbing, cyanosis or edema   SKIN: No rashes or other abnormalities except as noted under the Wound section below.   NEUROLOGICAL: normal motor and sensory function   EDEMA: Moderate   "     WOUND: The wound appears healthy with no sign of infection.   Wound bed: granulation tissue  Periwound: healthy intact skin  As the patient noted the wound that he had previously has healed however he does have 2 fairly small superficial abrasions to the left anterior shin.      Also see below for wound details:       Circumferential volume measures:        No flowsheet data found.    Ulceration(s)/Wound(s):   Please see the media tab under the chart review for pictures of the wounds.    VASC Wound Left shin (Active)   Pre Size Length 1.5 01/12/23 1400   Pre Size Width 1 01/12/23 1400   Pre Size Depth 0.1 01/12/23 1400   Pre Total Sq cm 1.5 01/12/23 1400       VASC Wound left shin superior (Active)   Pre Size Length 4 01/12/23 1400   Pre Size Width 5 01/12/23 1400   Pre Size Depth 0.1 01/12/23 1400   Pre Total Sq cm 20 01/12/23 1400   Description scattered 01/12/23 1400             No results for input(s): HGBA1C, A1C, EAG in the last 16877 hours.    Invalid input(s): [id number]       Recent Labs   Lab Test 09/05/19  0554 09/05/19  0116   ALBUMIN 3.6 3.5              No debridement performed today.                  ASSESSMENT:   This is a 67 year old male with left leg ulcers.           PLAN:   A Mepilex and his compression stockings changed every other day.  I have again explained to the patient today that controlling the edema is probably the most important thing we can do to help heal the wound.  I have specially recommended that they lay down with their legs above the level of the heart for 30 minutes at least twice a day.  I emphasized that if we can not control the edema we will likely not be able to get this wound to heal.   I have encouraged the patient to continue on their high protein diet to aid in wound healing.    I strongly encouraged the patient to keep the appointment with Dr. Salinas to discuss treatment options for his venous insufficiency.  The patient will return to the wound clinic in 2  weeks to see me again.        30 minutes spent on the date of the encounter doing chart review, history and exam, documentation and further activities per the note      Stanton Light MD  01/12/2023   2:51 PM   [facility]  [phone]

## 2023-01-12 NOTE — LETTER
[facility]  [address] [place] [address] [address]  [place], [place] [address]  [phone]      Fax [phone]    [facility]           Fax: [phone]            Customer Service: [phone]        Account #: [id number]    Wound Dressing Rx and Order Form  Order Status: new  Verbal: Zara  Date: 2023     Jose Loco  Gender: male  : 1955  [address] [facility] [id number] [phone] (home)     Medical Record: [id number]  Primary Care Provider: Jacob Monson      ICD-10-CM    1. Ulcer of left lower extremity with fat layer exposed (H)  L97.922 Wound care            Insurance Info:  INSURER: Payor: [employer] / Plan: [employer] MEDICARE ADVANTAGE / Product Type: HMO /   Policy ID#:  [id number]  SECONDARY INSURANCE:    Secondary Policy ID#:  N/A        Physician Info:   Name:  MICHAEL CAMILO     Dept Address/Phones:   [address] [place] [address], [address] [place] [address] [phone]  Fax: [phone]    Lymphedema circumferential measurements (in cm):  No flowsheet data found.      Wound info:  Wound 19 Left Other (Comment) Abrasion(s) On pinky toe: Patient has had for months- unsure what happened (Active)   Number of days: 1222       VASC Wound Left shin (Active)   Pre Size Length 1.5 23 1400   Pre Size Width 1 23 1400   Pre Size Depth 0.1 23 1400   Pre Total Sq cm 1.5 23 1400   Description scattered 22 1300   Number of days: 22       VASC Wound left shin superior (Active)   Pre Size Length 4 23 1400   Pre Size Width 5 23 1400   Pre Size Depth 0.1 23 1400   Pre Total Sq cm 20 23 1400   Description scattered 23 1400   Number of days: 0        Drainage: moderate  Thickness:  full  Duration of Need: 30 DAYS  Days Supply: 30 DAYS  Start Date: 2023  Starter Kit: Ancillary Kit (saline, gloves, gauze)  Qualifying wound/Debridement: Yes      Dressing Type Brand Size Frequency of change  "-or- Quantity   Primary mepilex bordered adhesvie bandage  3\"x3\" EVERY OTHER DAY and as needed *needs 2 per dressing change*     No substitutions preferred. Call [phone].         OK to forward to covered supplier.    Electronically Signed Physician:  MICHAEL CAMILO             Date: January 12, 2023    "

## 2023-01-20 ENCOUNTER — OFFICE VISIT (OUTPATIENT)
Dept: NEUROLOGY | Facility: CLINIC | Age: 68
End: 2023-01-20
Payer: COMMERCIAL

## 2023-01-20 ENCOUNTER — LAB (OUTPATIENT)
Dept: LAB | Facility: CLINIC | Age: 68
End: 2023-01-20
Payer: COMMERCIAL

## 2023-01-20 VITALS
OXYGEN SATURATION: 94 % | HEART RATE: 51 BPM | DIASTOLIC BLOOD PRESSURE: 84 MMHG | RESPIRATION RATE: 16 BRPM | SYSTOLIC BLOOD PRESSURE: 146 MMHG

## 2023-01-20 DIAGNOSIS — F03.918 DEMENTIA WITH BEHAVIORAL DISTURBANCE (H): ICD-10-CM

## 2023-01-20 DIAGNOSIS — G62.2 POLYNEUROPATHY DUE TO OTHER TOXIC AGENTS (H): ICD-10-CM

## 2023-01-20 DIAGNOSIS — G40.309: ICD-10-CM

## 2023-01-20 DIAGNOSIS — F32.1 DEPRESSION, MAJOR, SINGLE EPISODE, MODERATE (H): Primary | ICD-10-CM

## 2023-01-20 DIAGNOSIS — G20.C PARKINSONISM, UNSPECIFIED PARKINSONISM TYPE (H): ICD-10-CM

## 2023-01-20 DIAGNOSIS — F10.20 ALCOHOLISM (H): ICD-10-CM

## 2023-01-20 LAB — LEVETIRACETAM SERPL-MCNC: 64.3 ΜG/ML (ref 10–40)

## 2023-01-20 PROCEDURE — 80177 DRUG SCRN QUAN LEVETIRACETAM: CPT | Performed by: PSYCHIATRY & NEUROLOGY

## 2023-01-20 PROCEDURE — 36415 COLL VENOUS BLD VENIPUNCTURE: CPT | Performed by: PATHOLOGY

## 2023-01-20 PROCEDURE — 99215 OFFICE O/P EST HI 40 MIN: CPT | Performed by: PSYCHIATRY & NEUROLOGY

## 2023-01-20 RX ORDER — LEVETIRACETAM 500 MG/1
TABLET ORAL
Qty: 217 TABLET | Refills: 3 | Status: SHIPPED | OUTPATIENT
Start: 2023-01-20 | End: 2023-05-25

## 2023-01-20 ASSESSMENT — PATIENT HEALTH QUESTIONNAIRE - PHQ9: SUM OF ALL RESPONSES TO PHQ QUESTIONS 1-9: 9

## 2023-01-20 NOTE — PROGRESS NOTES
[facility] Epilepsy Care Progress Note      Patient:  Jose Loco  :  1955   Age:  67 year old   Today's Office Visit:  2023    Epilepsy Data:    Patient History  Primary Epileptologist/Provider: Sandeep Owens M.D.  Patient Status: Not yet controlled  Epilepsy Syndrome: Generalized Epilepsy unspecified     Tests/Surgery History  Last EEG: oct 2015  Last MRI: 2013    Seizure Record  Current Visit Date: 23  Previous Visit Date: 21  Months since last visit:   Seizure Type 1: Unspecified Staring Spell  Description of Sz Type 1: stares, still, unresponsive  Seizure Type 2: Unspecified Convulsion  Description of Sz Type 2: collapse with stiffening and amnesia    Background History:  Staring spells and major motor attacks started age 57. Complex medical disorder with encephalopathy, ROSY, intermittent paralyses. Evaluation unremarkalble except for elevation of paraneoplastic antibodies, followed by neuroimmunology. MRI at [facility] reportedly normal, EEG here with generalized epileptiform abnormalities. LEV started with cessation of spells. Breakthru Aug 2017, levetiracetam increased to 2500 mg/d. Fatoucristal Sep 2019; level not done, levetiracetam increased to 3500 mg/d.      History of Present Illness:   Almost two years since we last connected by phone. Have not seen in person since . They presented about 25 min late for initial visit but we were able to work them in later on in the clinic.    Report admission for possible seizures late Sep 2022; he was seen at [facility] and transferred to [facility]. Wife reports she noted confusion in the morning after he went to sleep in the basement.  She was concerned he had a seizure and brought him to ED. Confusion did not improve. Extensive records from [facility] present on Care Everywhere reivewed. Levetiracetam level initially was 111. He spiked a fever to 104 according to wife and cellulitis was found; notes confirm cellulitis.  Nonetheless there was concern that may have also had a seizure so VEEG was done at [facility] for most of the hospitalization. This showed rare generalized spike wave which became less frequent as the admission continued. MRI and CT scan of brain were normal. Levetiracetam was held. Wife reports that he was discharged off this medication; I cannot confirm this in the notes. Reports she restarted levetiracetam at 3500 mg per day after discharge. Further reports that he stopped taking medications for about two weeks several weeks ago but has been taking it regularly for the last week.    She has not witnessed any seizzures since we last spoke about two years ago. No seizures since he left the hospital.    Difficult to rule out absence. Reports he stares off at times but always responds when she calls him.      Current Outpatient Medications   Medication Sig Dispense Refill     albuterol (PROAIR HFA/PROVENTIL HFA/VENTOLIN HFA) 108 (90 Base) MCG/ACT inhaler Inhale 1-2 puffs into the lungs every 4 hours as needed 1-2 puffs every 4 hours prn.       allopurinol (ZYLOPRIM) 100 MG tablet Take 300 mg by mouth       allopurinol (ZYLOPRIM) 100 MG tablet 3 x 100mg tab by mouth daily at noon       allopurinol (ZYLOPRIM) 300 MG tablet Take 300 mg by mouth       amoxicillin (AMOXIL) 500 MG tablet ONLY FOR DENTAL APPOINTMENT: Reported on 3/21/2017       amoxicillin-clavulanate (AUGMENTIN) 875-125 MG tablet        aspirin 81 MG tablet Take by mouth daily       B-D U/F insulin pen needle USE AS DIRECTED WITH B12       baclofen (LIORESAL) 10 MG tablet Take 1 tablet by mouth 2 times daily       baclofen (LIORESAL) 10 MG tablet 10mg tab by mouth twice daily @ 6am and 10pm  2     carbidopa-levodopa (SINEMET)  MG tablet TAKE TWO TABLETS BY MOUTH THREE TIMES A DAY (AT 6AM, 12NOON, AND 11PM) 540 tablet 5     cephALEXin (KEFLEX) 500 MG capsule        cholecalciferol (HM VITAMIN D3) 50 MCG (2000 UT) CAPS 2000 unit capsule by mouth twice daily  @ noon and 6pm 100 capsule 2     clotrimazole (LOTRIMIN) 1 % external cream Apply topically daily as needed (feet) Reported on 3/21/2017       COMPRESSION STOCKINGS 1 each       cyanocobalamin (CYANOCOBALAMIN) 1000 MCG/ML injection Inject 1,000 mcg into the muscle every 28 days       cyanocobalamin (VITAMIN B12) 1000 MCG/ML injection Inject 1 mL into the muscle every 30 days       desonide (DESOWEN) 0.05 % external cream        diclofenac (VOLTAREN) 1 % topical gel APPLY 2 GRAMS TO AFFECTED AREA(S) FOUR TIMES DAILY       diphenhydrAMINE-zinc acetate (BENADRYL) 2-0.1 % external cream        doxycycline hyclate (VIBRA-TABS) 100 MG tablet TAKE ONE TABLET BY MOUTH TWICE A DAY FOR 7 DAYS       ferrous fumarate 65 mg ([place]. FE)-Vitamin C 125 mg (VITRON C)  MG TABS tablet 1 tab by mouth twice daily @ noon and 6pm       FLUoxetine (PROZAC) 20 MG capsule Take 20 mg by mouth every morning       fluticasone (FLONASE) 50 MCG/ACT nasal spray As needed       furosemide (LASIX) 40 MG tablet 2 x 40mg tab by mouth at 6am and 40mg tab by mouth at noon = 3/day 270 tablet 3     gabapentin (NEURONTIN) 800 MG tablet Take 800 mg by mouth       gabapentin (NEURONTIN) 800 MG tablet 800mg tab by mouth 3/day @ 6am, 12noon, 11pm       PAM-KAMRON 8.6 MG tablet TAKE ONE TO TWO TABLETS BY MOUTH TWICE A DAY AS NEEDED       HYDROcodone-acetaminophen (NORCO) 5-325 MG per tablet as needed       ipratropium - albuterol 0.5 mg/2.5 mg/3 mL (DUONEB) 0.5-2.5 (3) MG/3ML neb solution INHALE THREE MLS BY MOUTH EVERY 6 HOURS AS NEEDED FOR WHEEZING       Ketoprofen 10 % CREA Externally apply 1 Tube topically 2 times daily 1 Tube 1     levETIRAcetam (KEPPRA) 1000 MG tablet Take 1,000 mg by mouth       levETIRAcetam (KEPPRA) 500 MG tablet TAKE 3 TABLETS BY MOUTH EVERY MORNING AND 4 TABLETS IN EVENING 217 tablet 1     levETIRAcetam (KEPPRA) 750 MG tablet Take 1,500 mg by mouth       lidocaine (LIDODERM) 5 % patch Place 1 patch onto the skin daily as needed  for moderate pain Reported on 3/21/2017       LORazepam (ATIVAN) 1 MG tablet 1mg tab by mouth nightly as needed       magnesium 200 MG TABS Take 200 mg by mouth       metFORMIN (GLUCOPHAGE XR) 500 MG 24 hr tablet Take 1,000 mg by mouth       metFORMIN (GLUCOPHAGE-XR) 500 MG 24 hr tablet START WITH TAKE ONE TABLET BY MOUTH EVERY DAY WITH SUPPER FOR TWO WEEKS THEN TAKE TWO TABLETS BY MOUTH EVERY DAY WITH SUPPER       methadone (DOLOPHINE) 5 MG tablet 1-2 x 5mg tab by mouth nightly 1 tablet 0     MIRALAX 17 g packet MIX AND DRINK 17 GRAMS BY MOUTH OR NG-TUBE ONCE DAILY AS NEEDED       Naftifine (NAFTIN) 2 % GEL Apply topically daily Reported on 3/21/2017       other medical supplies Cane (device) For home use. X 12 weeks.       PARoxetine (PAXIL) 40 MG tablet Take 1 tablet (40 mg) by mouth At Bedtime  1     polyethylene glycol (MIRALAX) 17 GM/Dose powder Take 17 g by mouth       potassium chloride ER (K-DUR/KLOR-CON M) 20 MEQ CR tablet 20meq tab by mouth daily @ noon       potassium chloride ER (K-TAB) 20 MEQ CR tablet Take 20 mEq by mouth 2 times daily (with meals)       predniSONE (DELTASONE) 10 MG tablet TAKE 4 TABS. DAILY X 3 DAYS, THEN 3 TABS. X 3 DAYS, THEN 2 TABS. DAILY X 3 DAYS, THEN 1 TAB. X 3 DAYS       predniSONE (DELTASONE) 20 MG tablet        REFRESH OPTIVE ADVANCED 0.5-1-0.5 % SOLN PLACE ONE DROP INTO THE EYE(S) 5 TIMES DAILY AS NEEDED FOR DRY EYES       rOPINIRole (REQUIP) 1 MG tablet 1mg tab by mouth 4/day @ 6am, 12noon, 6pm, and 10-11pm 360 tablet 3     rotigotine (NEUPRO) 3 MG/24HR 24 hr patch Place 1 patch onto the skin daily 90 patch 3     simvastatin (ZOCOR) 40 MG tablet Take 40 mg by mouth       simvastatin (ZOCOR) 40 MG tablet 40mg tab by mouth at bedtime       sulfamethoxazole-trimethoprim (BACTRIM DS) 800-160 MG tablet TAKE ONE TABLET BY MOUTH TWICE A DAY FOR 10 DAYS       traMADol (ULTRAM) 50 MG tablet TAKE ONE-HALF TO ONE TABLET BY MOUTH THREE TIMES A DAY AS NEEDED FOR PAIN        "triamcinolone (KENALOG) 0.1 % external cream        triamcinolone (KENALOG) 0.5 % cream Apply topically 3 times daily Reported on 3/21/2017       UNABLE TO FIND nonformulary  P7 - CROW/CLON/GEORGETTE/IMIP/LIDO/PENT - 10/0.2/6/3/5/3% Apply 1-2 gms to affected area 3-4x per day. Rub in for 1-2 minutes.       UNABLE TO FIND Diabetic shoes DX: Diabetic Neuropathy       UNABLE TO FIND Syringe w/ Needle, Disposable - 3 ML (syringe) 22 x 1\" As directed.       valACYclovir (VALTREX) 1000 mg tablet Take 1 tablet (1,000 mg) by mouth daily as needed          Medication Notes:    Taking levetiracetam 1500 in AM and 2000 in the PM.    AED Medication Compliance:  compliant all of the time  Using a pill box:  Yes    Review of Systems:  No vomiting, diarrhea, fevers, hematuria or kidney stones.  Wife shares bed with him and denies kicking behaviors or violent behaviors at night.  Have you experienced a fall since your last visit: YES  Are these falls related to your seizures: NO    Other Issues:    He stopped taking B12 and wife reports level was low on admission tho I cannot find this in Care Everywhere. States getting regular injections now through primary care.  Memory care every other Friday. They find this very helpful. Still with RLS, he reports methadone no longer effective.  Wife reports patient has significant irritability. \"Blows up at her\". Verbal outbursts only; no violence. \"I don't think he will hurt me physically\". Wife reports that she feels safe at home.  Had been on fluoxetine in the past but Rx ran out; mood was better and there was less irritability and outbursts when taking this medication.  Is patient safe to drive:  No.    Exam:    BP (!) 146/84   Pulse 51   Resp 16   SpO2 94%      Wt Readings from Last 5 Encounters:   12/21/22 99.8 kg (220 lb)   11/15/19 99.3 kg (219 lb)   10/02/19 101.4 kg (223 lb 8 oz)   09/13/19 97.1 kg (214 lb)   09/04/19 99.8 kg (220 lb)     States date correctly. Oriented to day, month, " "year. Oriented to location and floor. Oriented to city, not sure re county. Repeat three items correctly Short term memory 3/3 verbal objects after distractor. \"World\" spelled backward: lrow. Follows three step command. Identifies all objects. Draws intersecting pentagons well. Has trouble drawing cube. Writes a simple sentence. Follows written command. Total MMSE = 26, Overall not much different from previous MMSE score. He is polite and cooperative throughout and sits quietly while wife discusses his outbursts. Denies suicidal ideation. Trouble getting out of chair. Safe gait, somewhat wide based. Romberg negative. VFF. EOMI. No nystagmus. Smile symmetrical. Tongue midline. No drift, pronation, or tremor. Strength full proximally and distally. FFN is done well. DTR are difficult to obtain. Vibratory sensation is diminished, several seconds at ankles bilaterally. Tone does not appear significantly increased to may exam.    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 This is based on interictal EEG and on apparent response to levetiracetam. Suspect he has had long term increased seizure tendency. I do not think seizures are related to cerebral degenerative syndrome or potential cerebral auto-immune disorder.   2) Possible seizure in Sep 2022 but this is not at all certain. His encephalopathy could have been entirely related to cellulitis, fever, and elevated levetiracetam level. Last definite seizure Sep 2019. Alcohol could have played some role in that breakthrough seizure, reports no alcohol use currently. VEEG continues showing generalized seizure tendency. Lack of obvious seizures during periods without levetiracetam suggests that seizure tendency is not very high.  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 Again, alcohol did not help. MMSE Nov 2019 performed by self (media tab) was 27, essentially unchanged compared to current.  4) Restless leg syndrome; " patient reports symptoms are worse; on methadone.  5) Worsening outbursts. Not clear whether this is an indication of his underlying dementia or depression. I suspect depression is playing the greater role given that he was doing better when taking SSRIs. Difficult to blame levetiracetam unless levels remain signifciantly elevated; behavior was fine for years while he was taking this medication.     PLAN:  1) Continue levetiracetam 1500 mg in AM and 2000 mg in PM. Refilled Rx today.  2) levetiracetam level today to rule out toxicity.  3) Urged follow up with primary care to consider reinitiation of selective serotonin reuptake inhibitor. Continue followup with neuroimmunology and movement disorders.  4) VPA or ESM may be appropriate medications to consider next for seizures. Lamotrigine could possibly help with mood but may be more challenging to initiate in this situation.     Total time in person today 36 min. Additional 7 min reviewing chart prior to visit. Additional 7 min generating note and coordinating care following visit. So total of 50 min, all on day of visit. Discussed with wife who provided unique information. Reviewed hospital discharge summary, consultation note neurology consultant [facility], 3 individual VEEG reports, MRI and CT scan report.    Sandeep Owens MD

## 2023-01-20 NOTE — NURSING NOTE
Chief Complaint   Patient presents with     RECHECK      Sanchez Veroniac      Depression Response    Patient completed the PHQ-9 assessment for depression and scored >9? Yes  Question 9 on the PHQ-9 was positive for suicidality? No  Does patient have current mental health provider? No    Is this a virtual visit? No    I personally notified the following: visit provider

## 2023-01-20 NOTE — LETTER
2023       RE: Jose Loco  [address] [facility] [address]     Dear Colleague,    Thank you for referring your patient, Jose Loco, to the [facility] [place] at [facility]. Please see a copy of my visit note below.    [facility] Note      Patient:  Jose Loco  :  1955   Age:  67 year old   Today's Office Visit:  2023    Epilepsy Data:    Patient History  Primary Epileptologist/Provider: Sandeep Owens M.D.  Patient Status: Not yet controlled  Epilepsy Syndrome: Generalized Epilepsy unspecified     Tests/Surgery History  Last EEG: oct 2015  Last MRI:     Seizure Record  Current Visit Date: 23  Previous Visit Date: 21  Months since last visit:   Seizure Type 1: Unspecified Staring Spell  Description of Sz Type 1: stares, still, unresponsive  Seizure Type 2: Unspecified Convulsion  Description of Sz Type 2: collapse with stiffening and amnesia    Background History:  Staring spells and major motor attacks started age 57. Complex medical disorder with encephalopathy, ROSY, intermittent paralyses. Evaluation unremarkalble except for elevation of paraneoplastic antibodies, followed by neuroimmunology. MRI at [place] reportedly normal, EEG here with generalized epileptiform abnormalities. LEV started with cessation of spells. Breakthru Aug 2017, levetiracetam increased to 2500 mg/d. Breaktrough Sep 2019; level not done, levetiracetam increased to 3500 mg/d.      History of Present Illness:   Almost two years since we last connected by phone. Have not seen in person since . They presented about 25 min late for initial visit but we were able to work them in later on in the clinic.    Report admission for possible seizures late Sep 2022; he was seen at [place] and transferred to [facility]. Wife reports she noted confusion in the morning  after he went to sleep in the basement.  She was concerned he had a seizure and brought him to ED. Confusion did not improve. Extensive records from [facility] present on Care Everywhere reivewed. Levetiracetam level initially was 111. He spiked a fever to 104 according to wife and cellulitis was found; notes confirm cellulitis. Nonetheless there was concern that may have also had a seizure so VEEG was done at [facility] for most of the hospitalization. This showed rare generalized spike wave which became less frequent as the admission continued. MRI and CT scan of brain were normal. Levetiracetam was held. Wife reports that he was discharged off this medication; I cannot confirm this in the notes. Reports she restarted levetiracetam at 3500 mg per day after discharge. Further reports that he stopped taking medications for about two weeks several weeks ago but has been taking it regularly for the last week.    She has not witnessed any seizzures since we last spoke about two years ago. No seizures since he left the hospital.    Difficult to rule out absence. Reports he stares off at times but always responds when she calls him.      Current Outpatient Medications   Medication Sig Dispense Refill     albuterol (PROAIR HFA/PROVENTIL HFA/VENTOLIN HFA) 108 (90 Base) MCG/ACT inhaler Inhale 1-2 puffs into the lungs every 4 hours as needed 1-2 puffs every 4 hours prn.       allopurinol (ZYLOPRIM) 100 MG tablet Take 300 mg by mouth       allopurinol (ZYLOPRIM) 100 MG tablet 3 x 100mg tab by mouth daily at noon       allopurinol (ZYLOPRIM) 300 MG tablet Take 300 mg by mouth       amoxicillin (AMOXIL) 500 MG tablet ONLY FOR DENTAL APPOINTMENT: Reported on 3/21/2017       amoxicillin-clavulanate (AUGMENTIN) 875-125 MG tablet        aspirin 81 MG tablet Take by mouth daily       B-D U/F insulin pen needle USE AS DIRECTED WITH B12       baclofen (LIORESAL) 10 MG tablet Take 1 tablet by mouth 2 times daily       baclofen (LIORESAL) 10 MG  tablet 10mg tab by mouth twice daily @ 6am and 10pm  2     carbidopa-levodopa (SINEMET)  MG tablet TAKE TWO TABLETS BY MOUTH THREE TIMES A DAY (AT 6AM, 12NOON, AND 11PM) 540 tablet 5     cephALEXin (KEFLEX) 500 MG capsule        cholecalciferol (HM VITAMIN D3) 50 MCG (2000 UT) CAPS 2000 unit capsule by mouth twice daily @ noon and 6pm 100 capsule 2     clotrimazole (LOTRIMIN) 1 % external cream Apply topically daily as needed (feet) Reported on 3/21/2017       COMPRESSION STOCKINGS 1 each       cyanocobalamin (CYANOCOBALAMIN) 1000 MCG/ML injection Inject 1,000 mcg into the muscle every 28 days       cyanocobalamin (VITAMIN B12) 1000 MCG/ML injection Inject 1 mL into the muscle every 30 days       desonide (DESOWEN) 0.05 % external cream        diclofenac (VOLTAREN) 1 % topical gel APPLY 2 GRAMS TO AFFECTED AREA(S) FOUR TIMES DAILY       diphenhydrAMINE-zinc acetate (BENADRYL) 2-0.1 % external cream        doxycycline hyclate (VIBRA-TABS) 100 MG tablet TAKE ONE TABLET BY MOUTH TWICE A DAY FOR 7 DAYS       ferrous fumarate 65 mg ([place]. FE)-Vitamin C 125 mg (VITRON C)  MG TABS tablet 1 tab by mouth twice daily @ noon and 6pm       FLUoxetine (PROZAC) 20 MG capsule Take 20 mg by mouth every morning       fluticasone (FLONASE) 50 MCG/ACT nasal spray As needed       furosemide (LASIX) 40 MG tablet 2 x 40mg tab by mouth at 6am and 40mg tab by mouth at noon = 3/day 270 tablet 3     gabapentin (NEURONTIN) 800 MG tablet Take 800 mg by mouth       gabapentin (NEURONTIN) 800 MG tablet 800mg tab by mouth 3/day @ 6am, 12noon, 11pm       PAM-KAMRON 8.6 MG tablet TAKE ONE TO TWO TABLETS BY MOUTH TWICE A DAY AS NEEDED       HYDROcodone-acetaminophen (NORCO) 5-325 MG per tablet as needed       ipratropium - albuterol 0.5 mg/2.5 mg/3 mL (DUONEB) 0.5-2.5 (3) MG/3ML neb solution INHALE THREE MLS BY MOUTH EVERY 6 HOURS AS NEEDED FOR WHEEZING       Ketoprofen 10 % CREA Externally apply 1 Tube topically 2 times daily 1 Tube 1      levETIRAcetam (KEPPRA) 1000 MG tablet Take 1,000 mg by mouth       levETIRAcetam (KEPPRA) 500 MG tablet TAKE 3 TABLETS BY MOUTH EVERY MORNING AND 4 TABLETS IN EVENING 217 tablet 1     levETIRAcetam (KEPPRA) 750 MG tablet Take 1,500 mg by mouth       lidocaine (LIDODERM) 5 % patch Place 1 patch onto the skin daily as needed for moderate pain Reported on 3/21/2017       LORazepam (ATIVAN) 1 MG tablet 1mg tab by mouth nightly as needed       magnesium 200 MG TABS Take 200 mg by mouth       metFORMIN (GLUCOPHAGE XR) 500 MG 24 hr tablet Take 1,000 mg by mouth       metFORMIN (GLUCOPHAGE-XR) 500 MG 24 hr tablet START WITH TAKE ONE TABLET BY MOUTH EVERY DAY WITH SUPPER FOR TWO WEEKS THEN TAKE TWO TABLETS BY MOUTH EVERY DAY WITH SUPPER       methadone (DOLOPHINE) 5 MG tablet 1-2 x 5mg tab by mouth nightly 1 tablet 0     MIRALAX 17 g packet MIX AND DRINK 17 GRAMS BY MOUTH OR NG-TUBE ONCE DAILY AS NEEDED       Naftifine (NAFTIN) 2 % GEL Apply topically daily Reported on 3/21/2017       other medical supplies Cane (device) For home use. X 12 weeks.       PARoxetine (PAXIL) 40 MG tablet Take 1 tablet (40 mg) by mouth At Bedtime  1     polyethylene glycol (MIRALAX) 17 GM/Dose powder Take 17 g by mouth       potassium chloride ER (K-DUR/KLOR-CON M) 20 MEQ CR tablet 20meq tab by mouth daily @ noon       potassium chloride ER (K-TAB) 20 MEQ CR tablet Take 20 mEq by mouth 2 times daily (with meals)       predniSONE (DELTASONE) 10 MG tablet TAKE 4 TABS. DAILY X 3 DAYS, THEN 3 TABS. X 3 DAYS, THEN 2 TABS. DAILY X 3 DAYS, THEN 1 TAB. X 3 DAYS       predniSONE (DELTASONE) 20 MG tablet        REFRESH OPTIVE ADVANCED 0.5-1-0.5 % SOLN PLACE ONE DROP INTO THE EYE(S) 5 TIMES DAILY AS NEEDED FOR DRY EYES       rOPINIRole (REQUIP) 1 MG tablet 1mg tab by mouth 4/day @ 6am, 12noon, 6pm, and 10-11pm 360 tablet 3     rotigotine (NEUPRO) 3 MG/24HR 24 hr patch Place 1 patch onto the skin daily 90 patch 3     simvastatin (ZOCOR) 40 MG tablet Take 40  "mg by mouth       simvastatin (ZOCOR) 40 MG tablet 40mg tab by mouth at bedtime       sulfamethoxazole-trimethoprim (BACTRIM DS) 800-160 MG tablet TAKE ONE TABLET BY MOUTH TWICE A DAY FOR 10 DAYS       traMADol (ULTRAM) 50 MG tablet TAKE ONE-HALF TO ONE TABLET BY MOUTH THREE TIMES A DAY AS NEEDED FOR PAIN       triamcinolone (KENALOG) 0.1 % external cream        triamcinolone (KENALOG) 0.5 % cream Apply topically 3 times daily Reported on 3/21/2017       UNABLE TO FIND nonformulary  P7 - CROW/CLON/GEORGETTE/IMIP/LIDO/PENT - 10/0.2/6/3/5/3% Apply 1-2 gms to affected area 3-4x per day. Rub in for 1-2 minutes.       UNABLE TO FIND Diabetic shoes DX: Diabetic Neuropathy       UNABLE TO FIND Syringe w/ Needle, Disposable - 3 ML (syringe) 22 x 1\" As directed.       valACYclovir (VALTREX) 1000 mg tablet Take 1 tablet (1,000 mg) by mouth daily as needed          Medication Notes:    Taking levetiracetam 1500 in AM and 2000 in the PM.    AED Medication Compliance:  compliant all of the time  Using a pill box:  Yes    Review of Systems:  No vomiting, diarrhea, fevers, hematuria or kidney stones.  Wife shares bed with him and denies kicking behaviors or violent behaviors at night.  Have you experienced a fall since your last visit: YES  Are these falls related to your seizures: NO    Other Issues:    He stopped taking B12 and wife reports level was low on admission tho I cannot find this in Care Everywhere. States getting regular injections now through primary care.  Memory care every other Friday. They find this very helpful. Still with RLS, he reports methadone no longer effective.  Wife reports patient has significant irritability. \"Blows up at her\". Verbal outbursts only; no violence. \"I don't think he will hurt me physically\". Wife reports that she feels safe at home.  Had been on fluoxetine in the past but Rx ran out; mood was better and there was less irritability and outbursts when taking this medication.  Is patient safe to " "drive:  No.    Exam:    BP (!) 146/84   Pulse 51   Resp 16   SpO2 94%      Wt Readings from Last 5 Encounters:   12/21/22 99.8 kg (220 lb)   11/15/19 99.3 kg (219 lb)   10/02/19 101.4 kg (223 lb 8 oz)   09/13/19 97.1 kg (214 lb)   09/04/19 99.8 kg (220 lb)     States date correctly. Oriented to day, month, year. Oriented to location and floor. Oriented to city, not sure re county. Repeat three items correctly Short term memory 3/3 verbal objects after distractor. \"World\" spelled backward: lrow. Follows three step command. Identifies all objects. Draws intersecting pentagons well. Has trouble drawing cube. Writes a simple sentence. Follows written command. Total MMSE = 26, Overall not much different from previous MMSE score. He is polite and cooperative throughout and sits quietly while wife discusses his outbursts. Denies suicidal ideation. Trouble getting out of chair. Safe gait, somewhat wide based. Romberg negative. VFF. EOMI. No nystagmus. Smile symmetrical. Tongue midline. No drift, pronation, or tremor. Strength full proximally and distally. FFN is done well. DTR are difficult to obtain. Vibratory sensation is diminished, several seconds at ankles bilaterally. Tone does not appear significantly increased to may exam.    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 This is based on interictal EEG and on apparent response to levetiracetam. Suspect he has had long term increased seizure tendency. I do not think seizures are related to cerebral degenerative syndrome or potential cerebral auto-immune disorder.   2) Possible seizure in Sep 2022 but this is not at all certain. His encephalopathy could have been entirely related to cellulitis, fever, and elevated levetiracetam level. Last definite seizure Sep 2019. Alcohol could have played some role in that breakthrough seizure, reports no alcohol use currently. VEEG continues showing generalized seizure tendency. Lack " of obvious seizures during periods without levetiracetam suggests that seizure tendency is not very high.  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 Again, alcohol did not help. MMSE Nov 2019 performed by self (media tab) was 27, essentially unchanged compared to current.  4) Restless leg syndrome; patient reports symptoms are worse; on methadone.  5) Worsening outbursts. Not clear whether this is an indication of his underlying dementia or depression. I suspect depression is playing the greater role given that he was doing better when taking SSRIs. Difficult to blame levetiracetam unless levels remain signifciantly elevated; behavior was fine for years while he was taking this medication.     PLAN:  1) Continue levetiracetam 1500 mg in AM and 2000 mg in PM. Refilled Rx today.  2) levetiracetam level today to rule out toxicity.  3) Urged follow up with primary care to consider reinitiation of selective serotonin reuptake inhibitor. Continue followup with neuroimmunology and movement disorders.  4) VPA or ESM may be appropriate medications to consider next for seizures. Lamotrigine could possibly help with mood but may be more challenging to initiate in this situation.     Total time in person today 36 min. Additional 7 min reviewing chart prior to visit. Additional 7 min generating note and coordinating care following visit. So total of 50 min, all on day of visit. Discussed with wife who provided unique information. Reviewed hospital discharge summary, consultation note neurology consultant HÉCTORW, 3 individual VEEG reports, MRI and CT scan report.    Sandeep Owens MD

## 2023-01-25 ENCOUNTER — OFFICE VISIT (OUTPATIENT)
Dept: VASCULAR SURGERY | Facility: CLINIC | Age: 68
End: 2023-01-25
Attending: FAMILY MEDICINE
Payer: COMMERCIAL

## 2023-01-25 VITALS
TEMPERATURE: 97.7 F | DIASTOLIC BLOOD PRESSURE: 80 MMHG | RESPIRATION RATE: 18 BRPM | HEART RATE: 56 BPM | SYSTOLIC BLOOD PRESSURE: 138 MMHG

## 2023-01-25 VITALS
SYSTOLIC BLOOD PRESSURE: 138 MMHG | DIASTOLIC BLOOD PRESSURE: 80 MMHG | TEMPERATURE: 97.7 F | HEART RATE: 56 BPM | RESPIRATION RATE: 18 BRPM

## 2023-01-25 DIAGNOSIS — I83.892 SYMPTOMATIC VARICOSE VEINS, LEFT: Primary | ICD-10-CM

## 2023-01-25 DIAGNOSIS — L97.922 ULCER OF LEFT LOWER EXTREMITY WITH FAT LAYER EXPOSED (H): ICD-10-CM

## 2023-01-25 DIAGNOSIS — L97.922 ULCER OF LEFT LOWER EXTREMITY WITH FAT LAYER EXPOSED (H): Primary | ICD-10-CM

## 2023-01-25 PROCEDURE — 99213 OFFICE O/P EST LOW 20 MIN: CPT | Performed by: SPECIALIST

## 2023-01-25 PROCEDURE — G0463 HOSPITAL OUTPT CLINIC VISIT: HCPCS | Performed by: FAMILY MEDICINE

## 2023-01-25 PROCEDURE — G0463 HOSPITAL OUTPT CLINIC VISIT: HCPCS | Mod: 27 | Performed by: SPECIALIST

## 2023-01-25 PROCEDURE — 99214 OFFICE O/P EST MOD 30 MIN: CPT | Performed by: FAMILY MEDICINE

## 2023-01-25 ASSESSMENT — PAIN SCALES - GENERAL
PAINLEVEL: MODERATE PAIN (5)
PAINLEVEL: MODERATE PAIN (5)

## 2023-01-25 NOTE — PATIENT INSTRUCTIONS
Varicose Vein Pre-Procedure Instructions/Vein Ablation     You are scheduled for a varicose vein treatment on your legs. The following is some helpful information for you, in regards to your treatment.    **Important:  A  will be needed post procedure.  (Unable to use Taxi or Uber).     We will supply a thigh high compression stocking for you. Please bring your compression sock as we only have sizes medium to X-large.    Please be aware, it is not advised to fly within 3 weeks post procedure    Please wear comfortable clothing.  We recommend that you bring a change of under clothes; they may get stained by the cleansing solution.    Feel free to bring a personal music player or a CD to listen to during your procedure.    Take your routine medications as you normally would with exception to blood thinners. Aspirin is ok to continue.    If you take Warfarin, Xarelto, or Eliquis this will need to be HELD prior to procedure according to primary care provider/doctor or cardiology who prescribes this medication.    Please notify us if you take this medication.    It is ok to eat prior to this procedure.    Please allow 1- 2 hours for your appointment.    For any questions regarding your procedure please call   [phone] to speak with the nurse.    If you would like a Good Kriss Estimate for your upcoming service/procedure contact Cost of Care Estimates at [phone], advocates are available Monday through Friday 8am - 5pm.    Radiofrequency Ablation Codes  48512 for first vein in either leg  11041 for second vein    Please have the following information available:  1. Patient name and date of birth  2. Insurance company, plan name, ID and group numbers  3. Description of the service/procedure and the associated procedure code numbers, if available. If more than one (1) procedure code, indicate which will be the primary procedure code.   4. The facility where the service/procedure will be performed.  5. The  name of the physician involved with the service/procedure.  6. Appointment date of service.  7. Telephone number to call with the information.    Radiofrequency Ablation (RFA) Treatment for Varicose Veins    Radiofrequency ablation (RFA) is a procedure to treat varicose veins. It uses heat created from radiofrequency (RF).    Varicose veins are swollen, enlarged veins. They happen most often in the legs. Varicose veins can develop when valves in your veins become damaged. This causes problems with blood flow. Over time, too much blood collects in your veins. The veins may bulge, twist, and stand out under your skin. They can also cause symptoms such as aching, cramping, or swelling in your legs.    During RFA treatment, RF heat is sent into your vein through a thin, flexible tube (catheter). This closes off blood flow in the main problem vein.           With RFA treatment, a catheter that contains RF heat is used to seal off the main problem vein.      Getting ready for your treatment  Follow any instructions from your healthcare provider.    Tell your provider if you:    Are pregnant or think you may be pregnant    Are breastfeeding    Smoke or use alcohol on a regular basis    Have any allergies or intolerances to certain medicines. Explain what reaction you have had to these medicines in the past.    Tell your provider about any medicines you are taking. You may need to stop taking all or some of these before the test. This includes:    Medicines that can thin your blood or prevent clotting (anticoagulants)    All prescription medicines    Over-the-counter medicines such as aspirin or ibuprofen    Street drugs    Herbs, vitamins, and other supplements    Follow any directions you're given for not eating or drinking before the procedure.    The day of your treatment  The treatment takes 45 to 60 minutes. The entire treatment (including time to prepare and recover) takes about 1 to 3 hours. You can go home the same  day. For the treatment:     You'll lie down on a hospital bed.    An imaging method, such as ultrasound, is used to guide the procedure.    The leg to be treated is injected with numbing medicine.    Once your leg is numb, a needle makes a small hole (puncture) in the vein to be treated.    The catheter with the RF heat source is inserted into your vein.    More numbing medicine may be injected around your vein.    Once the catheter is in the right position, it is then slowly drawn backward. As the catheter sends out heat, the vein is closed off.    In some cases, other side branch varicose veins may be removed or tied off through a few small cuts (incisions).    When the treatment is done, the catheter is removed. Pressure is applied to the insertion site to stop any bleeding. An elastic compression stocking or a bandage may then be put on your leg.    Recovering at home  Once at home, follow all the instructions you've been given. Be sure to:    Take all medicines as directed    Care for the catheter insertion site as directed    Check for signs of infection at the catheter insertion site (see below)    Wear elastic stockings or bandages as directed    Keep your legs raised (elevated) as directed    Walk a few times a day    Avoid heavy exercise, lifting, and standing for long periods as advised    Avoid air travel, hot baths, saunas, or whirlpools as advised    Call your healthcare provider  Call your healthcare provider if you have any of the following:    Fever of 100.4 F (38 C) or higher, or as directed by your provider    Chest pain or trouble breathing    Signs of infection at the catheter insertion site. These include increased redness or swelling (inflammation), warmth, increasing pain, bleeding, or bad-smelling discharge.    Severe numbness or tingling in the treated leg    Severe pain or swelling in the treated leg       Follow-up  You'll have a follow-up visit with your healthcare provider within a  week. An ultrasound will be done to check for problems, such as blood clots. Your provider will discuss further treatments with you, if needed.  Risks and possible complications   These include the following:    Bleeding    Infection    Blood clots    Damage to the nerves in the treated area    Irritation or burning of the skin over the treated vein    Treatment doesn't improve the look or the symptoms of the problem veins    Risks of any medicines used during the treatment      Date Last Reviewed: 5/1/2016 2000-2017 [employer]. [address] [place], [place] [address]. All rights reserved. This information is not intended as a substitute for professional medical care. Always follow your healthcare professional's instructions.    Self-Care for Spider and Varicose Veins  Your healthcare provider may suggest that you try self-care. Exercising and maintaining a healthy weight may keep problem veins from getting worse. Wearing elastic stockings and elevating your legs can help improve blood flow. Taking breaks when you sit or stand helps, too.         The top of the elastic stocking should be below the bend in your knee for a proper fit.      Exercising  Exercising is good for your veins because it improves blood flow. Walking, cycling, or swimming are great exercises for vein health. But be sure to check with your healthcare provider before starting any exercise program. Also, keep these hints in mind:    When exercising, start out slowly and try to build up to 30 minutes on most days.    Elevate your legs above heart level after exercise to keep blood from pooling in veins.    Maintaining a healthy weight  Being overweight puts extra pressure on your veins. To maintain a healthy weight, try these tips:    Choose lean meats, fish, and skinless chicken.    Use low-fat dairy products.    Eat foods high in fiber, such as whole grains, fruits, and vegetables.    Cut down on sugar, salt, and saturated and  trans fats.    Exercise regularly.    Wearing elastic stockings  Elastic stockings gently squeeze veins so blood flows upward. If you need elastic stockings, your healthcare provider can prescribe them for you. Follow your healthcare provider's advice about how and when to wear them. Elastic stockings come in several different levels of pressure. Ask your healthcare provider which level of pressure would benefit you the most.     Elevating your legs  Raising your legs above heart level will help relieve swelling and keep blood from pooling in veins. Try to elevate your legs for 15 to 20 minutes at the end of the day, and whenever you're relaxing. To make sure your legs are raised above heart level, prop them up on cushions or large pillows.    When sitting and standing  To keep blood moving when you have to sit or stand for long periods, try these tips:    At work, take walking breaks instead of coffee breaks. Walk during your lunch hour. Or try flexing your feet up and down 10 times each hour.    When standing, raise yourself up and down on your toes, or rock back and forth on your heels.    Date Last Reviewed: 5/1/2016 2000-2017 [employer]. [address] [place] [address]. All rights reserved. This information is not intended as a substitute for professional medical care. Always follow your healthcare professional's instructions.    [facility]  [address]  [place] [address]    [place] [phone]  [phone]  [place] [phone]  [email or web address]    [facility]   [address]             [address]                 [address]     [place] [address]      [place] [address]         [place] [address]  [phone]  [phone]            [phone]                Fax:  [phone]  [email or web address]                                              [facility]  [address] [place] [address]  [phone]  [phone]  [email or web address]    Cecilia Valdes  [phone]  [email or web address]    [facility]   [phone]    [place] [address] [place] [address]  [phone]

## 2023-01-25 NOTE — PROGRESS NOTES
Wound Clinic Note         Visit date: 01/25/2023       Che Complaint:     Jose Loco is a 67 year old male had concerns including left leg wound.  The patient has left leg ulcers.          HISTORY OF PRESENT ILLNESS:    Jose Loco reports the wound has been present since mid December 2022.  The wound began as a water blister that ruptured.   He previously developed bilateral lower extremity cellulitis and has completed taking all antibiotics at this time with no symptoms of an adverse reaction.  His wife confirms that all symptoms of the cellulitis have resolved.  The patient denies a history of congestive heart failure, previous vein procedures or previous DVT.       Since his last clinic visit with me he has been bandaging the wound with Mepilex and his compression stocking changed every other day.  He reports for the last week there is been no drainage from the left leg wound.        The patient is scheduled to see Dr. Salinas today to discuss treatment options for his venous insufficiency.      The pateint denies fevers or chills.  They report the pain from the wound has been 0/10 and has remained about the same recently.      The patient reports laying down to elevate their legs above the level of their heart at least twice a day.     Today the patient reports maintaining a high protein diet, but has not been taking protein supplements lately.        The patient denies a history of smoking or chronic steroid use.  and The patient confirms having diabetes and reports the blood sugars are well controlled.         The patient has not had any symptoms of infection relating to the wound recently and is not currently on antibiotics.       Problem List:   Past Medical History:   Diagnosis Date     ACP (advance care planning) 11/29/2012    Formatting of this note might be different from the original. Brochure given     Alcoholism (H) 7/25/2011    Overview:  No use since 2011.   Formatting of this note might be different from the original. No use since 2011.     Altered behavior 9/5/2019     Altered mental status 9/5/2019     Ataxia 1/15/2014    Overview:  Falls  Formatting of this note might be different from the original. 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 Complex medical disorder with encephalopathy, ROSY, intermittent paralyses. Evaluation unremarkalble except for elevation of paraneoplastic antibodies, followed by neuroimmunology. MRI at [place] reportedly normal, EEG here with generalized epileptiform abnormalities. LEV started with cessation of spells. Breakthru Aug 2017, levetiracetam increase     Dehydration 1/15/2014    Formatting of this note is different from the original. CREATININE                (mg/dL)  Date Value  1/15/2014 1.88*   (previous Creatinine 1.2)     Dementia (H) 1/15/2014    Overview:  March 2013: abnormal neuropsych testing at [facility]. 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 March 2013: abnormal neuropsych testing at [facility]. 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  HEMOGLOBIN A1C MONITORING (POCT) (% Total Hgb)  Date Value  9/5/2013 6.0   3/20/2013 6.7*  9/26/2012 5.7    Overview:  Formatting of this note might be different from the original.  HEMOGLOBIN A1C MONITORING (POCT) (% Total Hgb)  Date Value  9/5/2013 6.0   3/20/2013 6.7*  9/26/2012 5.7    Formatting of this note is different f     Diabetes mellitus without complication (H)  4/5/2006     Displacement of cervical intervertebral disc without myelopathy 10/5/2018    Overview:  2002 fall with disk herniation at C 6-7, cervical fusion. 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  Formatting of this note might be different from the original. 2002 fall with disk herniation at C 6-7, cervical fusion. 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     Edema 7/25/2011     Edema due to malnutrition, due to unspecified malnutrition type (H) 8/2/2021     Encephalopathy 12/10/2015     Gout, unspecified 6/8/2011     HTN (hypertension) 5/10/2012    Overview:  Lisinopril  Formatting of this note might be different from the original. Lisinopril     Hyperlipidemia 1/9/2018     Knee pain, bilateral 7/16/2011     Memory loss 8/18/2015     Muscle spasm of both lower legs 10/30/2014     Neuropathy 7/16/2011    Overview:  Gabapentin  Formatting of this note might be different from the original. Gabapentin     Nonsustained ventricular tachycardia 8/8/2022     Obesity 1/15/2014    Overview:  1/15/2014 Body mass index is 36.5 kg/(m^2).  Formatting of this note might be different from the original. 1/15/2014 Body mass index is 36.5 kg/(m^2).     ROSY (obstructive sleep apnea) 12/5/2012    Overview:  [facility] 11-28-12.  AHI 6.2  RDI 6.2  AHI REM 53.3  Oxygen Narayan 66%  Formatting of this note might be different from the original. [facility] 11-28-12.  AHI 6.2  RDI 6.2  AHI REM 53.3  Oxygen Narayan 66%     Pain medication agreement 6/7/2011    Overview:  Pain Clinic: MS Contin and Vicodin. 120 vicodin a month.  Formatting of this note might be different from the original. Pain Clinic: MS Contin and Vicodin. 120 vicodin a month.     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 mild asymmetry noted 2019     Restless legs syndrome (RLS) 2008     Right inguinal hernia 2021     Routine general medical examination at a health care facility 10/5/2018    Overview:  Colonoscopy Bone Denisty Last Lipids: Chol: 162    10/24/2006 T    10/24/2006 HDL:   32    10/24/2006 LDL:  90    10/24/2006 GLUCOSE                   (mg/dL)  Date             Value  2007          99  ---------- No results found for this basename:  PSASCREEN,PSADIAGNOSTI  Formatting of this note might be different from the original. Colonoscopy Bone Denisty Last Lipids: C     Seizures (H)      Sepsis (H) 2021     Substance abuse (H)      Umbilical hernia without obstruction and without gangrene 2021             Family Hx: family history includes Brain Cancer in an other family member; Cerebrovascular Disease in his father; Dementia in his father, mother, and another family member; Hearing Loss in his father; Memory loss in his father; Multiple births in his son and son; Other - See Comments in his father and mother; Prostate Cancer in his father.       Surgical Hx:   Past Surgical History:   Procedure Laterality Date     Cervical Cord Decompression  2002     GI SURGERY      gastric bypass surgery     HIP SURGERY Left     left hip surgery     KNEE SURGERY Left 2008    left knee arthroplasty          Allergies:    Allergies   Allergen Reactions     Seasonal Allergies Itching, Rash and Visual Disturbance              Medication History:    Current Outpatient Medications   Medication Sig     albuterol (PROAIR HFA/PROVENTIL HFA/VENTOLIN HFA) 108 (90 Base) MCG/ACT inhaler Inhale 1-2 puffs into the lungs every 4 hours as needed 1-2 puffs every 4 hours prn.     allopurinol (ZYLOPRIM) 100 MG tablet Take 300 mg by mouth     allopurinol (ZYLOPRIM) 100 MG tablet 3 x 100mg tab by mouth daily at noon     allopurinol (ZYLOPRIM) 300 MG tablet Take 300 mg by mouth      amoxicillin (AMOXIL) 500 MG tablet ONLY FOR DENTAL APPOINTMENT: Reported on 3/21/2017 (Patient not taking: Reported on 1/25/2023)     amoxicillin-clavulanate (AUGMENTIN) 875-125 MG tablet  (Patient not taking: Reported on 1/25/2023)     aspirin 81 MG tablet Take by mouth daily     B-D U/F insulin pen needle USE AS DIRECTED WITH B12     baclofen (LIORESAL) 10 MG tablet Take 1 tablet by mouth 2 times daily     baclofen (LIORESAL) 10 MG tablet 10mg tab by mouth twice daily @ 6am and 10pm     carbidopa-levodopa (SINEMET)  MG tablet TAKE TWO TABLETS BY MOUTH THREE TIMES A DAY (AT 6AM, 12NOON, AND 11PM)     cephALEXin (KEFLEX) 500 MG capsule  (Patient not taking: Reported on 1/25/2023)     cholecalciferol ( VITAMIN D3) 50 MCG (2000 UT) CAPS 2000 unit capsule by mouth twice daily @ noon and 6pm     clotrimazole (LOTRIMIN) 1 % external cream Apply topically daily as needed (feet) Reported on 3/21/2017     COMPRESSION STOCKINGS 1 each     cyanocobalamin (CYANOCOBALAMIN) 1000 MCG/ML injection Inject 1,000 mcg into the muscle every 28 days     cyanocobalamin (VITAMIN B12) 1000 MCG/ML injection Inject 1 mL into the muscle every 30 days     desonide (DESOWEN) 0.05 % external cream      diclofenac (VOLTAREN) 1 % topical gel APPLY 2 GRAMS TO AFFECTED AREA(S) FOUR TIMES DAILY     diphenhydrAMINE-zinc acetate (BENADRYL) 2-0.1 % external cream      doxycycline hyclate (VIBRA-TABS) 100 MG tablet TAKE ONE TABLET BY MOUTH TWICE A DAY FOR 7 DAYS     ferrous fumarate 65 mg ([place]. FE)-Vitamin C 125 mg (VITRON C)  MG TABS tablet 1 tab by mouth twice daily @ noon and 6pm     FLUoxetine (PROZAC) 20 MG capsule Take 20 mg by mouth every morning     fluticasone (FLONASE) 50 MCG/ACT nasal spray As needed     furosemide (LASIX) 40 MG tablet 2 x 40mg tab by mouth at 6am and 40mg tab by mouth at noon = 3/day     gabapentin (NEURONTIN) 800 MG tablet Take 800 mg by mouth     gabapentin (NEURONTIN) 800 MG tablet 800mg tab by mouth 3/day @  6am, 12noon, 11pm     PAM-KAMRON 8.6 MG tablet TAKE ONE TO TWO TABLETS BY MOUTH TWICE A DAY AS NEEDED     HYDROcodone-acetaminophen (NORCO) 5-325 MG per tablet as needed     ipratropium - albuterol 0.5 mg/2.5 mg/3 mL (DUONEB) 0.5-2.5 (3) MG/3ML neb solution INHALE THREE MLS BY MOUTH EVERY 6 HOURS AS NEEDED FOR WHEEZING     Ketoprofen 10 % CREA Externally apply 1 Tube topically 2 times daily     levETIRAcetam (KEPPRA) 1000 MG tablet Take 1,000 mg by mouth     levETIRAcetam (KEPPRA) 500 MG tablet TAKE 3 TABLETS BY MOUTH EVERY MORNING AND 4 TABLETS IN EVENING Strength: 500 mg     levETIRAcetam (KEPPRA) 750 MG tablet Take 1,500 mg by mouth     lidocaine (LIDODERM) 5 % patch Place 1 patch onto the skin daily as needed for moderate pain Reported on 3/21/2017     LORazepam (ATIVAN) 1 MG tablet 1mg tab by mouth nightly as needed     magnesium 200 MG TABS Take 200 mg by mouth     metFORMIN (GLUCOPHAGE XR) 500 MG 24 hr tablet Take 1,000 mg by mouth     metFORMIN (GLUCOPHAGE-XR) 500 MG 24 hr tablet START WITH TAKE ONE TABLET BY MOUTH EVERY DAY WITH SUPPER FOR TWO WEEKS THEN TAKE TWO TABLETS BY MOUTH EVERY DAY WITH SUPPER     methadone (DOLOPHINE) 5 MG tablet 1-2 x 5mg tab by mouth nightly     MIRALAX 17 g packet MIX AND DRINK 17 GRAMS BY MOUTH OR NG-TUBE ONCE DAILY AS NEEDED     Naftifine (NAFTIN) 2 % GEL Apply topically daily Reported on 3/21/2017     other medical supplies Cane (device) For home use. X 12 weeks.     PARoxetine (PAXIL) 40 MG tablet Take 1 tablet (40 mg) by mouth At Bedtime     polyethylene glycol (MIRALAX) 17 GM/Dose powder Take 17 g by mouth     potassium chloride ER (K-DUR/KLOR-CON M) 20 MEQ CR tablet 20meq tab by mouth daily @ noon     potassium chloride ER (K-TAB) 20 MEQ CR tablet Take 20 mEq by mouth 2 times daily (with meals)     predniSONE (DELTASONE) 10 MG tablet TAKE 4 TABS. DAILY X 3 DAYS, THEN 3 TABS. X 3 DAYS, THEN 2 TABS. DAILY X 3 DAYS, THEN 1 TAB. X 3 DAYS     predniSONE (DELTASONE) 20 MG tablet   "    REFRESH OPTIVE ADVANCED 0.5-1-0.5 % SOLN PLACE ONE DROP INTO THE EYE(S) 5 TIMES DAILY AS NEEDED FOR DRY EYES     rOPINIRole (REQUIP) 1 MG tablet 1mg tab by mouth 4/day @ 6am, 12noon, 6pm, and 10-11pm     rotigotine (NEUPRO) 3 MG/24HR 24 hr patch Place 1 patch onto the skin daily     simvastatin (ZOCOR) 40 MG tablet Take 40 mg by mouth     simvastatin (ZOCOR) 40 MG tablet 40mg tab by mouth at bedtime     sulfamethoxazole-trimethoprim (BACTRIM DS) 800-160 MG tablet TAKE ONE TABLET BY MOUTH TWICE A DAY FOR 10 DAYS (Patient not taking: Reported on 1/25/2023)     traMADol (ULTRAM) 50 MG tablet TAKE ONE-HALF TO ONE TABLET BY MOUTH THREE TIMES A DAY AS NEEDED FOR PAIN     triamcinolone (KENALOG) 0.1 % external cream      triamcinolone (KENALOG) 0.5 % cream Apply topically 3 times daily Reported on 3/21/2017     UNABLE TO FIND nonformulary  P7 - CROW/CLON/GEORGETTE/IMIP/LIDO/PENT - 10/0.2/6/3/5/3% Apply 1-2 gms to affected area 3-4x per day. Rub in for 1-2 minutes.     UNABLE TO FIND Diabetic shoes DX: Diabetic Neuropathy     UNABLE TO FIND Syringe w/ Needle, Disposable - 3 ML (syringe) 22 x 1\" As directed.     valACYclovir (VALTREX) 1000 mg tablet Take 1 tablet (1,000 mg) by mouth daily as needed     Current Facility-Administered Medications   Medication     lidocaine 112 mL, EPINEPHrine (ADRENALIN) 1.12 mg in sodium chloride 0.9 % 1,113.12 mL (TUMESCENT)         Tobacco History:  reports that he has never smoked. He has never used smokeless tobacco.       REVIEW OF SYMPTOMS:   The review of systems was negative except as noted in the HPI.           PHYSICAL EXAMINATION:     /80   Pulse 56   Temp 97.7  F (36.5  C)   Resp 18            GENERAL: The patient overall appears well and is no acute distress.   HEAD: normocephalic   EYES: Sclera and conjunctiva clear   NECK: no obvious masses   LUNGS: breathing is unlabored.   EXTREMITIES: No clubbing, cyanosis or edema   SKIN: No rashes or other abnormalities except as " noted under the Wound section below.   NEUROLOGICAL: normal motor and sensory function   EDEMA: Moderate       WOUND: Healed      Also see below for wound details:       Circumferential volume measures:        No flowsheet data found.    Ulceration(s)/Wound(s):   Please see the media tab under the chart review for pictures of the wounds.    VASC Wound Left shin (Active)   Description healed 01/25/23 1100       VASC Wound left shin superior (Active)   Description healed 01/25/23 1100             No results for input(s): HGBA1C, A1C, EAG in the last 34252 hours.    Invalid input(s): [id number]       Recent Labs   Lab Test 09/05/19  0554 09/05/19  0116   ALBUMIN 3.6 3.5              No debridement performed today.                  ASSESSMENT:   This is a 67 year old male with healed left leg ulcers.           PLAN:   A Mepilex and his compression stockings changed every other day for another week to allow the new epithelium which is covered over the wound to thicken up then no further bandages to be required after that.  He will need to continue to wear his compression stockings every day for the rest of his life.  I have encouraged the patient to continue to elevate the legs as they have been doing, including laying down with their legs above the level of the heart for 30 minutes at least twice a day.   I have encouraged the patient to continue on their high protein diet to aid in wound healing.    I strongly encouraged the patient to keep the appointment with Dr. Salinas to discuss treatment options for his venous insufficiency.  The patient will return to the wound clinic on an as-needed basis if further wounds develop.        30 minutes spent on the date of the encounter doing chart review, history and exam, documentation and further activities per the note      Stanton Light MD  01/25/2023   11:45 AM   [facility]ound  [phone]

## 2023-01-25 NOTE — PATIENT INSTRUCTIONS
Ok to continue with foam dressings for another week or so, then you can stop using dressings. Continue with your compression stockings.

## 2023-01-25 NOTE — PROGRESS NOTES
[facility]        Patient is here for a consult to discuss Varicose vein(s). The patient has varicose veins that are problematic in left legs. Symptoms patient has been experiencing are heaviness, aching and  swelling. Patient has history of venous wounds. Patient states their varicose veins are bothersome when standing, sitting,  working and household chores. Patient  has been wearing compression stockings. Patient has not been using pain medication or anti-inflammatory's. Patient  has had recent imaging on legs done.    Pt is currently taking Aspirin and Statin.    /80   Pulse 56   Temp 97.7  F (36.5  C)   Resp 18     The provider has been notified that the patient has concerns of symptomatic varicose veins and hx of venous ulcers.     Questions patient would like addressed today are: N/A.    Refills are needed: No N/A    Has homecare services and agency name:  Eliza Serna RN

## 2023-01-25 NOTE — PROGRESS NOTES
[facility] Vein Consult      Assessment:     1. varicose veins and spider veins, bilateral   2. leg ulcer , left   3. Insuffiencey of left greater saphenous vein    Plan:     1. Treatment options of conservative therapy of stockings use, exercise, weight loss, elevating legs when possible.    2. Script for compression stockings 20-30 mm hg  3. Ultrasound to evaluate legs for incompetency of both deep and superficial system .   4. Surgical treatment   Endovenous closure ofleft, greater saphenous vein     Risks and benefits of surgical intervention including infection, burns, dvt, thrombophlebitis, not closing, recurrence, numbness and nerve injury and need for further intervention were all discused    5. Follow up: for procedure if apporved.   6. Call for any questions concerns or issues    Subjective:      Jose Loco is a 67 year old male  who was referred by Jacob Monson  for evaluation of varicose veins. Symptoms include pain, aching, fatigue, burning, edema, dermatitis, external bleeding and ulcers . Patient has history of leg selling, pain and vein issues that have progressed. Pain and symptoms have affected daily living and work activities needing medications. Here for evaluation today. Stocking use with compression stockings of 20-30 mm hg or greater for greater then 3 months    Allergies:Seasonal allergies    Past Medical History:   Diagnosis Date     ACP (advance care planning) 11/29/2012    Formatting of this note might be different from the original. Brochure given     Alcoholism (H) 7/25/2011    Overview:  No use since 2011.  Formatting of this note might be different from the original. No use since 2011.     Altered behavior 9/5/2019     Altered mental status 9/5/2019     Ataxia 1/15/2014    Overview:  Falls  Formatting of this note might be different from the original. 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 Complex medical disorder with encephalopathy, ROSY, intermittent paralyses. Evaluation unremarkalble except for elevation of paraneoplastic antibodies, followed by neuroimmunology. MRI at [facility] reportedly normal, EEG here with generalized epileptiform abnormalities. LEV started with cessation of spells. Breakthru Aug 2017, levetiracetam increase     Dehydration 1/15/2014    Formatting of this note is different from the original. CREATININE                (mg/dL)  Date Value  1/15/2014 1.88*   (previous Creatinine 1.2)     Dementia (H) 1/15/2014    Overview:  March 2013: abnormal neuropsych testing at [facility]. 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 March 2013: abnormal neuropsych testing at [facility]. 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  HEMOGLOBIN A1C MONITORING (POCT) (% Total Hgb)  Date Value  9/5/2013 6.0   3/20/2013 6.7*  9/26/2012 5.7    Overview:  Formatting of this note might be different from the original.  HEMOGLOBIN A1C MONITORING (POCT) (% Total Hgb)  Date Value  9/5/2013 6.0   3/20/2013 6.7*  9/26/2012 5.7    Formatting of this note is different f     Diabetes mellitus without complication (H) 4/5/2006     Displacement of cervical intervertebral disc without myelopathy 10/5/2018    Overview:  2002 fall with disk herniation at C 6-7, cervical fusion. 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  Formatting of this note might be different from the original. 2002 fall with disk herniation at C 6-7, cervical fusion. Tetraparesis secondary to cervical  myelopathy.     Edema 2011     Edema due to malnutrition, due to unspecified malnutrition type (H) 2021     Encephalopathy 12/10/2015     Gout, unspecified 2011     HTN (hypertension) 5/10/2012    Overview:  Lisinopril  Formatting of this note might be different from the original. Lisinopril     Hyperlipidemia 2018     Knee pain, bilateral 2011     Memory loss 2015     Muscle spasm of both lower legs 10/30/2014     Neuropathy 2011    Overview:  Gabapentin  Formatting of this note might be different from the original. Gabapentin     Nonsustained ventricular tachycardia 2022     Obesity 1/15/2014    Overview:  1/15/2014 Body mass index is 36.5 kg/(m^2).  Formatting of this note might be different from the original. 1/15/2014 Body mass index is 36.5 kg/(m^2).     ROSY (obstructive sleep apnea) 2012    Overview:  [facility] 12.  AHI 6.2  RDI 6.2  AHI REM 53.3  Oxygen Narayan 66%  Formatting of this note might be different from the original. [facility] 12.  AHI 6.2  RDI 6.2  AHI REM 53.3  Oxygen Narayan 66%     Pain medication agreement 2011    Overview:  [facility]: MS Contin and Vicodin. 120 vicodin a month.  Formatting of this note might be different from the original. Pain Clinic: MS Contin and Vicodin. 120 vicodin a month.     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 mild asymmetry noted 2019     Restless legs syndrome (RLS) 2008     Right inguinal hernia 2021     Routine general medical examination at a health care facility 10/5/2018    Overview:  Colonoscopy Bone Denisty Last Lipids: Chol: 162    10/24/2006 T    10/24/2006 HDL:   32    10/24/2006 LDL:  90     10/24/2006 GLUCOSE                   (mg/dL)  Date             Value  02/01/2007          99  ---------- No results found for this basename:  PSASCREEN,PSADIAGNOSTI  Formatting of this note might be different from the original. Colonoscopy Bone [facility]isty Last Lipids: C     Seizures (H)      Sepsis (H) 7/19/2021     Substance abuse (H)      Umbilical hernia without obstruction and without gangrene 7/19/2021       Past Surgical History:   Procedure Laterality Date     Cervical Cord Decompression  2002     GI SURGERY      gastric bypass surgery     HIP SURGERY Left     left hip surgery     KNEE SURGERY Left 2008    left knee arthroplasty         Current Outpatient Medications:      albuterol (PROAIR HFA/PROVENTIL HFA/VENTOLIN HFA) 108 (90 Base) MCG/ACT inhaler, Inhale 1-2 puffs into the lungs every 4 hours as needed 1-2 puffs every 4 hours prn., Disp: , Rfl:      allopurinol (ZYLOPRIM) 100 MG tablet, Take 300 mg by mouth, Disp: , Rfl:      allopurinol (ZYLOPRIM) 100 MG tablet, 3 x 100mg tab by mouth daily at noon, Disp:  , Rfl:      allopurinol (ZYLOPRIM) 300 MG tablet, Take 300 mg by mouth, Disp: , Rfl:      aspirin 81 MG tablet, Take by mouth daily, Disp: , Rfl:      B-D U/F insulin pen needle, USE AS DIRECTED WITH B12, Disp: , Rfl:      baclofen (LIORESAL) 10 MG tablet, Take 1 tablet by mouth 2 times daily, Disp: , Rfl:      baclofen (LIORESAL) 10 MG tablet, 10mg tab by mouth twice daily @ 6am and 10pm, Disp: , Rfl: 2     carbidopa-levodopa (SINEMET)  MG tablet, TAKE TWO TABLETS BY MOUTH THREE TIMES A DAY (AT 6AM, 12NOON, AND 11PM), Disp: 540 tablet, Rfl: 5     cholecalciferol (HM VITAMIN D3) 50 MCG (2000 UT) CAPS, 2000 unit capsule by mouth twice daily @ noon and 6pm, Disp: 100 capsule, Rfl: 2     clotrimazole (LOTRIMIN) 1 % external cream, Apply topically daily as needed (feet) Reported on 3/21/2017, Disp: , Rfl:      COMPRESSION STOCKINGS, 1 each, Disp: , Rfl:      cyanocobalamin (CYANOCOBALAMIN) 1000 MCG/ML  injection, Inject 1,000 mcg into the muscle every 28 days, Disp: , Rfl:      cyanocobalamin (VITAMIN B12) 1000 MCG/ML injection, Inject 1 mL into the muscle every 30 days, Disp: , Rfl:      desonide (DESOWEN) 0.05 % external cream, , Disp: , Rfl:      diclofenac (VOLTAREN) 1 % topical gel, APPLY 2 GRAMS TO AFFECTED AREA(S) FOUR TIMES DAILY, Disp: , Rfl:      diphenhydrAMINE-zinc acetate (BENADRYL) 2-0.1 % external cream, , Disp: , Rfl:      doxycycline hyclate (VIBRA-TABS) 100 MG tablet, TAKE ONE TABLET BY MOUTH TWICE A DAY FOR 7 DAYS, Disp: , Rfl:      ferrous fumarate 65 mg ([place]. FE)-Vitamin C 125 mg (VITRON C)  MG TABS tablet, 1 tab by mouth twice daily @ noon and 6pm, Disp: , Rfl:      FLUoxetine (PROZAC) 20 MG capsule, Take 20 mg by mouth every morning, Disp: , Rfl:      fluticasone (FLONASE) 50 MCG/ACT nasal spray, As needed, Disp: , Rfl:      furosemide (LASIX) 40 MG tablet, 2 x 40mg tab by mouth at 6am and 40mg tab by mouth at noon = 3/day, Disp: 270 tablet, Rfl: 3     gabapentin (NEURONTIN) 800 MG tablet, Take 800 mg by mouth, Disp: , Rfl:      gabapentin (NEURONTIN) 800 MG tablet, 800mg tab by mouth 3/day @ 6am, 12noon, 11pm, Disp: , Rfl:      PAM-KAMRON 8.6 MG tablet, TAKE ONE TO TWO TABLETS BY MOUTH TWICE A DAY AS NEEDED, Disp: , Rfl:      HYDROcodone-acetaminophen (NORCO) 5-325 MG per tablet, as needed, Disp: , Rfl:      ipratropium - albuterol 0.5 mg/2.5 mg/3 mL (DUONEB) 0.5-2.5 (3) MG/3ML neb solution, INHALE THREE MLS BY MOUTH EVERY 6 HOURS AS NEEDED FOR WHEEZING, Disp: , Rfl:      Ketoprofen 10 % CREA, Externally apply 1 Tube topically 2 times daily, Disp: 1 Tube, Rfl: 1     levETIRAcetam (KEPPRA) 1000 MG tablet, Take 1,000 mg by mouth, Disp: , Rfl:      levETIRAcetam (KEPPRA) 500 MG tablet, TAKE 3 TABLETS BY MOUTH EVERY MORNING AND 4 TABLETS IN EVENING Strength: 500 mg, Disp: 217 tablet, Rfl: 3     levETIRAcetam (KEPPRA) 750 MG tablet, Take 1,500 mg by mouth, Disp: , Rfl:      lidocaine  (LIDODERM) 5 % patch, Place 1 patch onto the skin daily as needed for moderate pain Reported on 3/21/2017, Disp: , Rfl:      LORazepam (ATIVAN) 1 MG tablet, 1mg tab by mouth nightly as needed, Disp: , Rfl:      magnesium 200 MG TABS, Take 200 mg by mouth, Disp: , Rfl:      metFORMIN (GLUCOPHAGE XR) 500 MG 24 hr tablet, Take 1,000 mg by mouth, Disp: , Rfl:      metFORMIN (GLUCOPHAGE-XR) 500 MG 24 hr tablet, START WITH TAKE ONE TABLET BY MOUTH EVERY DAY WITH SUPPER FOR TWO WEEKS THEN TAKE TWO TABLETS BY MOUTH EVERY DAY WITH SUPPER, Disp: , Rfl:      methadone (DOLOPHINE) 5 MG tablet, 1-2 x 5mg tab by mouth nightly, Disp: 1 tablet, Rfl: 0     MIRALAX 17 g packet, MIX AND DRINK 17 GRAMS BY MOUTH OR NG-TUBE ONCE DAILY AS NEEDED, Disp: , Rfl:      Naftifine (NAFTIN) 2 % GEL, Apply topically daily Reported on 3/21/2017, Disp: , Rfl:      other medical supplies, Cane (device) For home use. X 12 weeks., Disp: , Rfl:      PARoxetine (PAXIL) 40 MG tablet, Take 1 tablet (40 mg) by mouth At Bedtime, Disp: , Rfl: 1     polyethylene glycol (MIRALAX) 17 GM/Dose powder, Take 17 g by mouth, Disp: , Rfl:      potassium chloride ER (K-DUR/KLOR-CON M) 20 MEQ CR tablet, 20meq tab by mouth daily @ noon, Disp: , Rfl:      potassium chloride ER (K-TAB) 20 MEQ CR tablet, Take 20 mEq by mouth 2 times daily (with meals), Disp: , Rfl:      predniSONE (DELTASONE) 10 MG tablet, TAKE 4 TABS. DAILY X 3 DAYS, THEN 3 TABS. X 3 DAYS, THEN 2 TABS. DAILY X 3 DAYS, THEN 1 TAB. X 3 DAYS, Disp: , Rfl:      predniSONE (DELTASONE) 20 MG tablet, , Disp: , Rfl:      REFRESH OPTIVE ADVANCED 0.5-1-0.5 % SOLN, PLACE ONE DROP INTO THE EYE(S) 5 TIMES DAILY AS NEEDED FOR DRY EYES, Disp: , Rfl:      rOPINIRole (REQUIP) 1 MG tablet, 1mg tab by mouth 4/day @ 6am, 12noon, 6pm, and 10-11pm, Disp: 360 tablet, Rfl: 3     rotigotine (NEUPRO) 3 MG/24HR 24 hr patch, Place 1 patch onto the skin daily, Disp: 90 patch, Rfl: 3     simvastatin (ZOCOR) 40 MG tablet, Take 40 mg by  "mouth, Disp: , Rfl:      simvastatin (ZOCOR) 40 MG tablet, 40mg tab by mouth at bedtime, Disp: , Rfl:      traMADol (ULTRAM) 50 MG tablet, TAKE ONE-HALF TO ONE TABLET BY MOUTH THREE TIMES A DAY AS NEEDED FOR PAIN, Disp: , Rfl:      triamcinolone (KENALOG) 0.1 % external cream, , Disp: , Rfl:      triamcinolone (KENALOG) 0.5 % cream, Apply topically 3 times daily Reported on 3/21/2017, Disp: , Rfl:      UNABLE TO FIND, nonformulary  P7 - CROW/CLON/GEORGETTE/IMIP/LIDO/PENT - 10/0.2/6/3/5/3% Apply 1-2 gms to affected area 3-4x per day. Rub in for 1-2 minutes., Disp: , Rfl:      UNABLE TO FIND, Diabetic shoes DX: Diabetic Neuropathy, Disp: , Rfl:      UNABLE TO FIND, Syringe w/ Needle, Disposable - 3 ML (syringe) 22 x 1\" As directed., Disp: , Rfl:      valACYclovir (VALTREX) 1000 mg tablet, Take 1 tablet (1,000 mg) by mouth daily as needed, Disp: , Rfl:      amoxicillin (AMOXIL) 500 MG tablet, ONLY FOR DENTAL APPOINTMENT: Reported on 3/21/2017 (Patient not taking: Reported on 2023), Disp: , Rfl:      amoxicillin-clavulanate (AUGMENTIN) 875-125 MG tablet, , Disp: , Rfl:      cephALEXin (KEFLEX) 500 MG capsule, , Disp: , Rfl:      sulfamethoxazole-trimethoprim (BACTRIM DS) 800-160 MG tablet, TAKE ONE TABLET BY MOUTH TWICE A DAY FOR 10 DAYS (Patient not taking: Reported on 2023), Disp: , Rfl:      Family History   Problem Relation Age of Onset     Dementia Mother         started in her 70s,  age 83 or 84     Other - See Comments Mother         moderate. lives with spouse     Other - See Comments Father         hearing decline, prostate cancer, memory loss, possible dementia     Prostate Cancer Father      Memory loss Father      Cerebrovascular Disease Father      Hearing Loss Father      Dementia Father      Multiple births Son      Multiple births Son      Brain Cancer Other         grand parent     Dementia Other         reports that he has never smoked. He has never used smokeless tobacco. He reports current " alcohol use. He reports that he does not use drugs.      Review of Systems:    Pertinent items are noted in HPI.  Patient has symptomatic veins and changes of bilateral legs. These have progressed to the point of causing symptoms on a daily basis. This causes issues with daily activities and chores such as mowing lawn, outdoor upkeep and standing for long lengths of time       Objective:     Vitals:    01/25/23 1057   BP: 138/80   Pulse: 56   Resp: 18   Temp: 97.7  F (36.5  C)     There is no height or weight on file to calculate BMI.    EXAM:  GENERAL: This is a well-developed 67 year old male who appears his stated age  HEAD: normocephalic  HEENT: Pupils equal and reactive bilaterally  MOUTH: mucus membranes intact. Normal dentation  CARDIAC: RRR without murmur  CHEST/LUNG:  Clear to auscultation bilaterally  ABDOMEN: Soft, nontender, nondistended, no masses noted   NEUROLOGIC: Focally intact, nonfocal, alert and oriented x 3  INTEGUMENT: No open lesions or ulcers  VASCULAR: Pulses intact, symmetrical upper and lower extremities. There areskin changes consistent with chronic venous insufficiency. Varicose veins present in bilateral greater saphenous distribution. Spider veins present bilateral.                   Imaging:    Exam Information    Exam Date Exam Time Accession # Performing Department Results    12/21/22  2:56 PM [id number] [facility] [place]      PACS Images     Show images for US Venous Competency Bilateral     Study Result    Narrative & Impression   BILATERAL Venous Insufficiency Ultrasound (Date: 12/21/22)    BILATERAL Lower Extremity          Indication:  Bilateral leg swelling,Cellulitis, and blistering      Previous: none     Patient History: None, Swelling and Stasis     Presenting Symptoms:  Swelling, Varicose Veins, Ulcers, Pain and Stasis     Technique:   Supine and Upright Ultrasound of the Deep and Superficial Veins with Valsalva and  Compression Augmentation Maneuvers. Duplex Imaging is performed utilizing gray-scale, Two-dimensional images, color-flow imaging, Doppler waveform analysis, and Spectral doppler imaging done with provacative maneuvers.      Incompetency Criteria:  Deep vein reflux reported when greater than 1000 msec flow reversal. Superficial vein reflux reported when equal to or greater than 600 msec flow reversal.  vein reflux reported as greater than 350 msec flow reversal.      Right  Leg Deep Veins    CFV SFJ PFV PROX FV   PROX FV MID FV DIST POP V. PERON.   V. PTV'S   Compressibility  (FC,PC,NC) FC FC FC FC FC FC FC FC FC   Reflux -   - - - - -   -         Right Leg Saphenous Veins  Location SFJ PROX THIGH KNEE MID CALF SPJ PROX CALF MID CALF   RT GSV (mm) 7 6 5 5         Reflux - - - -         RT SSV (mm)         3 3 2   Reflux         - - -      Left Leg Deep Veins    CFV SFJ PFV PROX SFV PROX SFV MID SFV DIST POP V. PERON. V. PTV'S   Compressibility  (FC,PC,NC) FC FC FC FC FC FC FC FC FC   Reflux -   - - - - +   -         Lt Leg Saphenous Veins   Location SFJ PROX THIGH KNEE MID CALF SPJ PROX CALF MID CALF   LT GSV (mm) 9 7 5 5         Reflux + + - +         LT SSV (mm)         5 4 3   Reflux         - - -      Comments: LEFT incompetent  veins visualized 6 cm Left medial distal calf medial to ulcer     Impression:       Right Deep Vein Findings: Patent deep venous system without reflux or DVT     Left Deep Vein Findings: Patent deep venous system without reflux or DVT     Superficial Vein Findings:      Right Greater Saphenous vein: Patent Greater Saphenous Vein without evidence of reflux.     Right Small Saphenous Vein: Patent Small Saphenous Vein without evidence of reflux.     Left Greater Saphenous Vein: Patent Greater Saphenous vein with Reflux noted From proximal thigh to saphenofemoral junction with a Maximum diameter of 9 mm.     Left Small Saphenous Vein: Patent Small Saphenous Vein without  evidence of reflux.        Reference:     Compressibility: FC= Fully compressible, PC= Partially compressible, NC= Non-compressible, NV= Not Visualized     Reflux: (+) Incompetent  (-) Competent, (NV) = Not Visualized     Interpretation criteria:          Duration of Retrograde flow (milliseconds)  Category Deep Veins Superficial Veins  veins   Competent < 1000ms < 500ms < 350ms   Incompetent > 1000ms > 500ms > 350ms             Benjamin Salinas MD  General Surgery [phone]  Vascular Surgery [phone]

## 2023-02-01 DIAGNOSIS — G20.C PARKINSONISM, UNSPECIFIED PARKINSONISM TYPE (H): ICD-10-CM

## 2023-02-01 RX ORDER — CARBIDOPA AND LEVODOPA 25; 100 MG/1; MG/1
TABLET ORAL
Qty: 180 TABLET | Refills: 0 | Status: SHIPPED | OUTPATIENT
Start: 2023-02-01 | End: 2023-04-18

## 2023-02-01 NOTE — TELEPHONE ENCOUNTER
Rx Authorization:  Requested Medication/ Dose carbidopa-levodopa (SINEMET)  MG tablet  Date last refill ordered: 12/16/21  Quantity ordered: 540  # refills: 5  Date of last clinic visit with ordering provider: 12/7/21  Date of next clinic visit with ordering provider:   All pertinent protocol data (lab date/result):   Include pertinent information from patients message:

## 2023-02-08 RX ORDER — LEVETIRACETAM 500 MG/1
TABLET ORAL
Qty: 217 TABLET | Refills: 1 | OUTPATIENT
Start: 2023-02-08

## 2023-03-08 DIAGNOSIS — G20.C PARKINSONISM, UNSPECIFIED PARKINSONISM TYPE (H): ICD-10-CM

## 2023-03-08 RX ORDER — CARBIDOPA AND LEVODOPA 25; 100 MG/1; MG/1
TABLET ORAL
Qty: 180 TABLET | Refills: 0 | OUTPATIENT
Start: 2023-03-08

## 2023-03-08 NOTE — TELEPHONE ENCOUNTER
Rx Authorization:  Requested Medication/ Dose: Carbidopa/Levodopa 25-100MG Tabs  Date last refill ordered: 2/1/23  Quantity ordered: 180 tabs  # refills:   Date of last clinic visit with ordering provider: 1/20/23  Date of next clinic visit with ordering provider: 5/26/23  All pertinent protocol data (lab date/result):   Include pertinent information from patients message:

## 2023-03-08 NOTE — TELEPHONE ENCOUNTER
Nils has not seen Dr. Ochoa since 6/1/2021. An appointment needs to be made before he can receive any further refills.

## 2023-03-09 NOTE — TELEPHONE ENCOUNTER
Called to schedule return visit with Dr. Ochoa. Spoke with spouse, sched phone follow up 3/28/23 at 3pm.     Matthew Zayas on 3/9/2023 at 9:44 AM

## 2023-03-11 RX ORDER — AMOXICILLIN 500 MG/1
CAPSULE ORAL
COMMUNITY
Start: 2023-02-01

## 2023-03-11 NOTE — PROGRESS NOTES
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 STOCKINGS 1 each       cyanocobalamin (CYANOCOBALAMIN) 1000 MCG/ML injection Inject 1,000 mcg into the muscle every 28 days       cyanocobalamin (VITAMIN B12) 1000 MCG/ML injection Inject 1 mL into the muscle every 30 days       desonide (DESOWEN) 0.05 % external cream        diclofenac (VOLTAREN) 1 % topical gel APPLY 2 GRAMS TO AFFECTED AREA(S) FOUR TIMES DAILY       diphenhydrAMINE-zinc acetate (BENADRYL) 2-0.1 % external cream        doxycycline hyclate (VIBRA-TABS) 100 MG tablet TAKE ONE TABLET BY MOUTH TWICE A DAY FOR 7 DAYS       ferrous fumarate 65 mg ([place]. FE)-Vitamin C 125 mg (VITRON C)  MG TABS tablet 1 tab by mouth twice daily @ noon and 6pm       FLUoxetine (PROZAC) 20 MG capsule Take 20 mg by mouth every morning       fluticasone (FLONASE) 50 MCG/ACT nasal spray As needed       furosemide (LASIX) 40 MG tablet 2 x 40mg tab by mouth at 6am and 40mg tab by mouth at noon = 3/day 270 tablet 3     gabapentin (NEURONTIN) 800 MG tablet Take 800 mg by mouth       gabapentin (NEURONTIN) 800 MG tablet 800mg tab by mouth 3/day @ 6am, 12noon, 11pm       PAM-KAMRON 8.6 MG tablet TAKE ONE TO TWO TABLETS BY MOUTH TWICE A DAY AS NEEDED       HYDROcodone-acetaminophen (NORCO) 5-325 MG  per tablet as needed       ipratropium - albuterol 0.5 mg/2.5 mg/3 mL (DUONEB) 0.5-2.5 (3) MG/3ML neb solution INHALE THREE MLS BY MOUTH EVERY 6 HOURS AS NEEDED FOR WHEEZING       Ketoprofen 10 % CREA Externally apply 1 Tube topically 2 times daily 1 Tube 1     levETIRAcetam (KEPPRA) 1000 MG tablet Take 1,000 mg by mouth       levETIRAcetam (KEPPRA) 500 MG tablet TAKE 3 TABLETS BY MOUTH EVERY MORNING AND 4 TABLETS IN EVENING Strength: 500 mg 217 tablet 3     levETIRAcetam (KEPPRA) 750 MG tablet Take 1,500 mg by mouth       lidocaine (LIDODERM) 5 % patch Place 1 patch onto the skin daily as needed for moderate pain Reported on 3/21/2017       LORazepam (ATIVAN) 1 MG tablet 1mg tab by mouth nightly as needed       magnesium 200 MG TABS Take 200 mg by mouth       metFORMIN (GLUCOPHAGE XR) 500 MG 24 hr tablet Take 1,000 mg by mouth       metFORMIN (GLUCOPHAGE-XR) 500 MG 24 hr tablet START WITH TAKE ONE TABLET BY MOUTH EVERY DAY WITH SUPPER FOR TWO WEEKS THEN TAKE TWO TABLETS BY MOUTH EVERY DAY WITH SUPPER       methadone (DOLOPHINE) 5 MG tablet 1-2 x 5mg tab by mouth nightly 1 tablet 0     MIRALAX 17 g packet MIX AND DRINK 17 GRAMS BY MOUTH OR NG-TUBE ONCE DAILY AS NEEDED       Naftifine (NAFTIN) 2 % GEL Apply topically daily Reported on 3/21/2017       other medical supplies Cane (device) For home use. X 12 weeks.       PARoxetine (PAXIL) 40 MG tablet Take 1 tablet (40 mg) by mouth At Bedtime  1     polyethylene glycol (MIRALAX) 17 GM/Dose powder Take 17 g by mouth       potassium chloride ER (K-DUR/KLOR-CON M) 20 MEQ CR tablet 20meq tab by mouth daily @ noon       potassium chloride ER (K-TAB) 20 MEQ CR tablet Take 20 mEq by mouth 2 times daily (with meals)       predniSONE (DELTASONE) 10 MG tablet TAKE 4 TABS. DAILY X 3 DAYS, THEN 3 TABS. X 3 DAYS, THEN 2 TABS. DAILY X 3 DAYS, THEN 1 TAB. X 3 DAYS       predniSONE (DELTASONE) 20 MG tablet        REFRESH OPTIVE ADVANCED 0.5-1-0.5 % SOLN PLACE ONE DROP INTO THE EYE(S)  "5 TIMES DAILY AS NEEDED FOR DRY EYES       rOPINIRole (REQUIP) 1 MG tablet 1mg tab by mouth 4/day @ 6am, 12noon, 6pm, and 10-11pm 360 tablet 3     rotigotine (NEUPRO) 3 MG/24HR 24 hr patch Place 1 patch onto the skin daily 90 patch 3     simvastatin (ZOCOR) 40 MG tablet Take 40 mg by mouth       simvastatin (ZOCOR) 40 MG tablet 40mg tab by mouth at bedtime       sulfamethoxazole-trimethoprim (BACTRIM DS) 800-160 MG tablet TAKE ONE TABLET BY MOUTH TWICE A DAY FOR 10 DAYS (Patient not taking: Reported on 1/25/2023)       traMADol (ULTRAM) 50 MG tablet TAKE ONE-HALF TO ONE TABLET BY MOUTH THREE TIMES A DAY AS NEEDED FOR PAIN       triamcinolone (KENALOG) 0.1 % external cream        triamcinolone (KENALOG) 0.5 % cream Apply topically 3 times daily Reported on 3/21/2017       UNABLE TO FIND nonformulary  P7 - CROW/CLON/GEORGETTE/IMIP/LIDO/PENT - 10/0.2/6/3/5/3% Apply 1-2 gms to affected area 3-4x per day. Rub in for 1-2 minutes.       UNABLE TO FIND Diabetic shoes DX: Diabetic Neuropathy       UNABLE TO FIND Syringe w/ Needle, Disposable - 3 ML (syringe) 22 x 1\" As directed.       valACYclovir (VALTREX) 1000 mg tablet Take 1 tablet (1,000 mg) by mouth daily as needed           "

## 2023-03-20 ENCOUNTER — TELEPHONE (OUTPATIENT)
Dept: VASCULAR SURGERY | Facility: CLINIC | Age: 68
End: 2023-03-20
Payer: COMMERCIAL

## 2023-03-20 NOTE — TELEPHONE ENCOUNTER
Called patient spoke with pts wife to review upcoming procedure.    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     Reviewed Blood Thinners and plan for holding, continuing and/or bridging: ok to continue Aspirin 81mg    Reviewed Post Procedure follow up plan and appts made: Yes    Reviewed if needing time off work, job position, estimated return to work. [facility] paperwork should be faxed to the provider's office to [phone]. N/A    Reviewed procedure with patients and instructions: Yes      Bring a  on the day of the procedure for radiofrequency ablation or if having stab phlebectomy.      Please arrive to clinic 15 minutes early prior to arrival time to use the restroom and sign consent.       Please wear a mask and wear loose-fitting comfortable clothing.       For in clinic procedure (Radiofrequency ablation or Endoseal procedure) bring your own thigh-high compression or compression shorts and your knee high stockings if you think you may not fit into our M-XL thigh-high compression socks. You will wear the thigh-high stockings for 2 nights continuously after the procedure, then wear thigh-high stockings for 2 weeks after but taking off at bedtime, and then you can transition into compression stockings of your choice. You should then continue to wear your compression as much as possible.       For stab phlebectomy you will be discharged with compression wraps from your toe to thigh and will wear the wraps for 5-7  days. You will then transition into thigh high compression stockings for 1 month. You should then continue to wear your compression as much as possible.       You will not be able to fly for 3 weeks, no vigorous activity for 2 weeks, and no lifting over 20 lbs.        We have received approval from [facility] medicare advantage. If your insurance has changed please notify us. Be aware this doesn't mean you are covered at 100%. Please check with insurance for coverage and your out of pocket costs.      All questions answered and number provided if any further questions arise or changes in patient status.

## 2023-03-23 RX ORDER — POLYETHYLENE GLYCOL 3350 17 G/17G
17 POWDER, FOR SOLUTION ORAL DAILY PRN
COMMUNITY
Start: 2023-03-23

## 2023-03-23 RX ORDER — TRIAMCINOLONE ACETONIDE 1 MG/G
CREAM TOPICAL
COMMUNITY
Start: 2023-03-23

## 2023-03-28 ENCOUNTER — VIRTUAL VISIT (OUTPATIENT)
Dept: NEUROLOGY | Facility: CLINIC | Age: 68
End: 2023-03-28
Payer: COMMERCIAL

## 2023-03-28 DIAGNOSIS — Z80.42 FAMILY HISTORY OF PROSTATE CANCER IN FATHER: ICD-10-CM

## 2023-03-28 DIAGNOSIS — E55.9 VITAMIN D DEFICIENCY: ICD-10-CM

## 2023-03-28 DIAGNOSIS — G20.C PARKINSONISM, UNSPECIFIED PARKINSONISM TYPE (H): Primary | ICD-10-CM

## 2023-03-28 PROBLEM — F03.918 DEMENTIA WITH BEHAVIORAL DISTURBANCE (H): Status: ACTIVE | Noted: 2023-01-30

## 2023-03-28 PROCEDURE — 99215 OFFICE O/P EST HI 40 MIN: CPT | Mod: 95 | Performed by: PSYCHIATRY & NEUROLOGY

## 2023-03-28 RX ORDER — MULTIVITAMIN WITH IRON
1 TABLET ORAL DAILY
COMMUNITY
Start: 2023-03-28 | End: 2023-03-28

## 2023-03-28 RX ORDER — POTASSIUM CHLORIDE 1500 MG/1
TABLET, EXTENDED RELEASE ORAL
COMMUNITY
Start: 2023-03-28

## 2023-03-28 RX ORDER — MULTIVITAMIN WITH IRON
1 TABLET ORAL 2 TIMES DAILY
COMMUNITY
Start: 2023-03-28

## 2023-03-28 RX ORDER — FLUOXETINE 40 MG/1
40 CAPSULE ORAL EVERY MORNING
COMMUNITY
Start: 2023-01-30 | End: 2023-03-28

## 2023-03-28 RX ORDER — PAROXETINE 10 MG/1
TABLET, FILM COATED ORAL
Qty: 120 TABLET | Refills: 11 | COMMUNITY
Start: 2023-03-28

## 2023-03-28 NOTE — PROGRESS NOTES
Nils is a 67 year old who is being evaluated via a billable telephone visit.      What phone number would you like to be contacted at? [phone]  How would you like to obtain your AVS? [place]    Distant Location (provider location):  Off-site  Phone call duration:  minutes

## 2023-03-28 NOTE — LETTER
3/28/2023       RE: Jose Curtis Prochnow  [address] [facility] [address]     Medications      6am 12p   6p 10/11pm     Albuterol proair proventil ventolin HFA inhaler  prn           Allopurinol zyloprim 100mg    3          Amoxicillin amoxil dental           Aspirin 81mg  1          Baclofen 10mg lioresal (Dr Galvez)  1     1      Carbidopa/levodopa sinemet 25/100 2  2   2     Cholecalciferol vitamin D3 2000 units    1         Clotrimazole lotrimin 1% cream  stopped           Compression stockings             Cyanocobalamin vitamin b12 1000mcg/ml injection  monthly            Desonide desowen 0.05% cream        Diclofenac voltaren 1% gel        Diphenhydramine-zinc acetate benadryl cream prn           Diphenhydramine HCL ex 2% topical prn           Donepezil aricept 10mg  stopped           Ferrous fumarate 65mg,  vit C 125mg vitron C    1 1       Fluoxetine prozac 20mg Off        Fluticasone propionate flonase NA 50 mcg prn           Furosemide lasix 40mg  2  1         Gabapentin neurontin 800mg (Dr Galvez) 1 1   1     PAM-KAMRON 8.6mg (senna senokot) no       HM vitamin D3 2000 units 50mcg above            Hydrocodone- acetaminophen norco 5-325mg prn           Ibuprofen advil/motrin 800mg no           Ipratropium albuterol duoneb neb prn       Ketoprofen 10% cream prn           Levetiracetam keppra 500mg  3     4     Lidocaine lidodermin 5% patch prn           Lorazepam ativan 1mg       prn     Magnesium 250mg (will review with Leonor)  1 1     Metformin glucophage XR 500mg 24hr    2     Methadone dolophine 5mg        1-2     Miralax polyethylene glycol  Prn        Naftifine naftin 2% gel daily           Neupro 3mg/24 hr pt24 below           Ondansetron zofran   stopped           Paroxetine paxil 40mg  off       Polyethylene glycol miralax above       Potassium chloride SA Kdur Klor con 20meq CR   1 1        Refresh optive advanced soln        Ropinirole requip 1mg  1 1 1 1 doll   Rotigotine Neupro 3mg/24  hr patch 1       sharmila   Simvastatin zocor 40mg        1     Tramadol ultram 50mg Rarely prn           Triamcinolone kenalog 0.5% or 0.1% cream prn           Unable to find - diabetic shoes              Unable to find - syringe             Unable to find  gilmer/clon/hawa/imip/lido/pent 3-4/day            Valacyclovir valtrex 100mg prn               He is continuing on medications for RLS  Ferrous fumarate 65mg,[place] FE, vitamin C 125mg vitron C   Gabapentin neurontin 800mg 3/day  Ropinirole requip 1mg 4/day   Rotigotine Neupro 3mg/24 hr patch  He may want to talk with his pcp about a recent study about dipyridamole and its b enefit on RLS  Here are the doses that were used in the study   Dipyridamole 100mg/day x 1 week then 200mg/day x 1 week and if needed 300mg/day         He has been a bit irritable that may be related to his cognitive impairment and aggravated by mood issues as well as possibly keppra - they may want to talk with Dr. Owens about if 3500mg/day of keppra is needed or if he can get by with less.     Plan:    RLS medications  From Dr. Maddox  Ferrous fumarate 65mg,[place] FE, vitamin C 125mg vitron C   Gabapentin neurontin 800mg 3/day  Ropinirole requip 1mg 4/day   Rotigotine Neupro 3mg/24 hr patch    Seizures - Dr. Owens 5/23/2023 return visit   Continuing Levetiracetam keppra 500mg    Has mood issues and is not taking a selective serotonin reuptake inhibitor  He had been on paroxetine and fluoxetine in the past  Recommend restarting the paroxetine and titrate the dose back up starting with 10mg and then increase weekly to 40mg    Gait disorder  Dopamine scan Datscan - 5/23/2019  A presynaptic dopaminergic deficit not present. Some asymmetry noted.   sinemet carbidopa/levodopa 2 tabs 3/day    Cognitive disorder - stable off donepezil     Constipation continues and was encouraged to use senokot S or/and polyethylene glycol miralax    Family history of prostate cancer  On a diuretic 2 x  40mg and 40mg - and has frequent urination  Will need a discussion about prostate cancer with pcp or urologist (referral placed if needed)    Return back    Continuing on sinemet     12/2021 was last visit with me    Return back in person 1 year  Dear Colleague,    Thank you for referring your patient, Jose Loco, to the [facility] [place] at [facility]. Please see a copy of my visit note below.        VIDEO VISIT - converted to a phone call    Date of Visit: March 28, 2023  Name: Jose Loco  Date of Birth 1955    [id number]    [address] [address]  [phone] ()   [phone] (M)  No email  email is [email or web address]  Regina Loco  [phone]     Ongoing care  Dr. Rachna Barragan - radha Owens seen   Dr. Brandan Monson PCP     visit [phone]     Return back for face to face visit  Remain on sinemet for now. Consider weaning.       Assessment:  (G20) Parkinsonism, unspecified Parkinsonism type (H)  (primary encounter diagnosis)  (Z01.89) Probably normal dopamine scan (DaTSCAN). mild asymmetry noted 2019  (primary encounter diagnosis)  (G20) Parkinsonism, unspecified Parkinsonism type (H)  (primary encounter diagnosis)  (F03.91) Dementia with behavioral disturbance, unspecified dementia type (H)     Dopamine scan Datscan - 5/23/2019  A presynaptic dopaminergic deficit not present. Some asymmetry noted.   Brain MRI - nonspecific changes 3/2019  CT scan 4/7/2021 - Normal head CT    Carbidopa/levodopa Sinemet 25/100 2 tabs 3/day @ 6, noon and 11pm    Review of diagnosis    Movement problems  Cognitive disorder  shuffling    Avoidance of dopamine blockers   Not taking    Motor complication review       Review of Impulse control disorders       Review of surgical or medication options       Gait/Balance/Falls       Exercise/Therapy  performed/offered     Cognitive/Driving   Cognitive disorder  Autoimmune vs degenerative - stable  Not taking donepezil    Mood   Irritable at time  Lorazepam  Paroxetine - off this.   Lost both parents December 2020  Mother had dementia; father covid  One grand daughter  Second grand child may 2020    Hallucinations/delusions       Sleep   Possible RLS/PLMS  Ketoprofen gel    Been sleepier than normal  Has not been as active  Falls asleep in the morning  Saturdays usually out of bed by 7am  Going to bed around 9 or 10pm  Falls asleep within 30 minutes  Drifting off at times watching sporting event  Does up to urinate a few times per night   Sees Dr. Maddox for his sleep issues.     Ropinirole requip 1mg 4/day at 6am, 12noon, 6pm and 10-11pm  Rotigotine Neupro 3mg/24 hr patch       Bladder/Renal/Prostate/Gyn/Other  Urinary problems/gout  Has urinary frequency and urgency and is on the diuretic  Allopurinol zyloprim 100mg x 3 at noon    GI/Constipation/GERD   Gastric bypass  Alcohol consumption  Variable bowel function  Has had constipation and diarrhea in the past and is better now.  Not using ondansetron/zofran  No significant heartburn  Ondansetron zofran -not taking    ENDO/Lipid/DM/Bone density/Thyroid  Cholecalciferol Vitamin D3 2000 units twice daily   JULIA antibody  Elevated blood sugars with A1c was 6.8 on 10/30/2020  Simvastatin zocor 40mg     Cardio/heart/Hyper or Hypotensive   Right leg swelling - right ankle issue  4/2021  Ultrasound venous right leg:  No deep venous thrombosis in the right lower extremity.  Went to  lymphedema clinic long time ago  Furosemide lasix 40mg  Potassium chloride SA Kdur Klor con 20meq CR tablet    Vision/Dry Eyes/Cataracts/Glaucoma/Macular       Heme/Anticoagulation/Antiplatelet/Anemia/Other  Iron   Hemoglobin was normal 10/30/2020  aspirin 81mg daily  Cyanocobalamin vitamin b12 1000mcg/ml injection  monthly  Ferrous fumarate 65mg,[place] FE, vitamin C 125mg vitron C 65-12 twice  "daily at noon and 6pm    ENT/Resp  Breathing  Has been good and not using the inhaler much if at all  Albuterol proair HFA/proventil HFA/ventolin (90 base) mcg/act inhaler  Fluticasone propionate flonase NA 50 mcg    Skin/Cancer/Seborrhea/other      Musculoskeletal/Pain/Headache  C spine injury  Has more leg pain - right foot is bad  Brace that was made.   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 This is based on interictal EEG and on apparent response to levetiracetam. Suspect he has had long term increased seizure tendency. I do not think seizures are related to cerebral degenerative syndrome or potential cerebral auto-immune disorder.   2) Possible seizure in Sep 2022 but this is not at all certain. His encephalopathy could have been entirely related to cellulitis, fever, and elevated levetiracetam level. Last definite seizure Sep 2019. Alcohol could have played some role in that breakthrough seizure, reports no alcohol use currently. VEEG continues showing generalized seizure tendency. Lack of obvious seizures during periods without levetiracetam suggests that seizure tendency is not very high.  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 Again, alcohol did not help. MMSE Nov 2019 performed by self (media tab) was 27, essentially unchanged " compared to current.  4) Restless leg syndrome; patient reports symptoms are worse; on methadone.  5) Worsening outbursts. Not clear whether this is an indication of his underlying dementia or depression. I suspect depression is playing the greater role given that he was doing better when taking SSRIs. Difficult to blame levetiracetam unless levels remain signifciantly elevated; behavior was fine for years while he was taking this medication.     PLAN:  1) Continue levetiracetam 1500 mg in AM and 2000 mg in PM. Refilled Rx today.  2) levetiracetam level today to rule out toxicity.  3) Urged follow up with primary care to consider reinitiation of selective serotonin reuptake inhibitor. Continue followup with neuroimmunology and movement disorders.  4) VPA (valproate) or ESM (ethosuximide) may be appropriate medications to consider next for seizures. Lamotrigine could possibly help with mood but may be more challenging to initiate in this situation.    Medications      6am 12p   6p 10/11pm     Albuterol proair proventil ventolin HFA inhaler  prn           Allopurinol zyloprim 100mg    3          Amoxicillin amoxil dental           Aspirin 81mg  1          Baclofen 10mg lioresal (Dr Galvez)  1     1      Carbidopa/levodopa sinemet 25/100 2  2   2     Cholecalciferol vitamin D3 2000 units    1         Clotrimazole lotrimin 1% cream  stopped           Compression stockings             Cyanocobalamin vitamin b12 1000mcg/ml injection  monthly            Desonide desowen 0.05% cream        Diclofenac voltaren 1% gel        Diphenhydramine-zinc acetate benadryl cream prn           Diphenhydramine HCL ex 2% topical prn           Donepezil aricept 10mg  stopped           Ferrous fumarate 65mg,  vit C 125mg vitron C    1 1       Fluoxetine prozac 20mg Off        Fluticasone propionate flonase NA 50 mcg prn           Furosemide lasix 40mg  2  1         Gabapentin neurontin 800mg (Dr Galvez) 1 1   1     PAM-KAMRON 8.6mg (senna  senokot) no       HM vitamin D3 2000 units 50mcg above            Hydrocodone- acetaminophen norco 5-325mg prn           Ibuprofen advil/motrin 800mg no           Ipratropium albuterol duoneb neb prn       Ketoprofen 10% cream prn           Levetiracetam keppra 500mg  3     4     Lidocaine lidodermin 5% patch prn           Lorazepam ativan 1mg       prn     Magnesium 250mg (will review with Leonor)  1 1     Metformin glucophage XR 500mg 24hr    2     Methadone dolophine 5mg        1-2     Miralax polyethylene glycol  Prn        Naftifine naftin 2% gel daily           Neupro 3mg/24 hr pt24 below           Ondansetron zofran   stopped           Paroxetine paxil 40mg  off       Polyethylene glycol miralax above       Potassium chloride SA Kdur Klor con 20meq CR   1 1        Refresh optive advanced soln        Ropinirole requip 1mg  1 1 1 1 doll   Rotigotine Neupro 3mg/24 hr patch 1       doll   Simvastatin zocor 40mg        1     Tramadol ultram 50mg Rarely prn           Triamcinolone kenalog 0.5% or 0.1% cream prn           Unable to find - diabetic shoes              Unable to find - syringe             Unable to find  gilmer/clon/hawa/imip/lido/pent 3-4/day            Valacyclovir valtrex 100mg prn               He is continuing on medications for RLS  Ferrous fumarate 65mg,[place] FE, vitamin C 125mg vitron C   Gabapentin neurontin 800mg 3/day  Ropinirole requip 1mg 4/day   Rotigotine Neupro 3mg/24 hr patch  He may want to talk with his pcp about a recent study about dipyridamole and its b enefit on RLS  Here are the doses that were used in the study   Dipyridamole 100mg/day x 1 week then 200mg/day x 1 week and if needed 300mg/day         He has been a bit irritable that may be related to his cognitive impairment and aggravated by mood issues as well as possibly keppra - they may want to talk with Dr. Owens about if 3500mg/day of keppra is needed or if he can get by with less.     Plan:    RLS medications   From Dr. Maddox  Ferrous fumarate 65mg,[place] FE, vitamin C 125mg vitron C   Gabapentin neurontin 800mg 3/day  Ropinirole requip 1mg 4/day   Rotigotine Neupro 3mg/24 hr patch    Seizures - Dr. Owens 5/23/2023 return visit   Continuing Levetiracetam keppra 500mg    Has mood issues and is not taking a selective serotonin reuptake inhibitor  He had been on paroxetine and fluoxetine in the past  Recommend restarting the paroxetine and titrate the dose back up starting with 10mg and then increase weekly to 40mg    Gait disorder  Dopamine scan Datscan - 5/23/2019  A presynaptic dopaminergic deficit not present. Some asymmetry noted.   sinemet carbidopa/levodopa 2 tabs 3/day    Cognitive disorder - stable off donepezil     Constipation continues and was encouraged to use senokot S or/and polyethylene glycol miralax    Family history of prostate cancer  On a diuretic 2 x 40mg and 40mg - and has frequent urination  Will need a discussion about prostate cancer with pcp or urologist (referral placed if needed)    Return back    Continuing on sinemet     12/2021 was last visit with me    Return back in person 1 year      Medical Decision Making:  #  Chronic progressive medical conditions addressed  Seizures, cognitive, movement issues  Review and/or interpretation of unique test or documentation from a provider outside of neurology no   Independent historian provided additional details  yes   Prescription drug management and review of potential side effects and/or monitoring for side effects  yes   Health impacted by social determinants of health  Yes -     I have reviewed the note as documented above.  This accurately captures the substance of my conversation with the patient and total time spent preparing for visit, executing visit and completing visit on the day of the visit:  40 minutes.     Garry Ochoa  "MD      ------------------------------------------------------------------------------------------------------------------------------------------------------------------------    Video-Visit Details    The patient has been notified of following:     \"After a review of the patient s situation, this visit was changed from an in-person visit to a video visit to reduce the risk of COVID-19 exposure.   The patient is being evaluated via a billable video visit.\"    \"This video visit will be conducted via a call between you and your physician/provider. We have found that certain health care needs can be provided without the need for an in-person physical exam.  This service lets us provide the care you need with a video conversation.  If a prescription is necessary we can send it directly to your pharmacy.  If lab work is needed we can place an order for that and you can then stop by our lab to have the test done at a later time.    If during the course of the call the physician/provider feels a video visit is not appropriate, you will not be charged for this service.\"     Patient has given verbal consent for Video visit? Yes    Patient would like the video invitation sent by:     Type of service:  Video Visit    Video Start Time:     Video End Time (time video stopped):     Duration:  minutes - see above    Originating Location (pt. Location):     Distant Location (provider location):  [facility] NEUROLOGY [facility]     Mode of Communication:  Video Conference via Tsukulink (and if not possible then doximity)      Garry Ochoa MD      --------------------------------------------------------------------------------------------------------------    Jose Loco is a 67 year old male who is being evaluated via a billable video visit.      Charts reviewed  Consult from  Images reviewed        I have reviewed and updated the patient's Past Medical History, Social History, Family History and " Medication List.    ALLERGIES  Seasonal allergies    Lasts visit details if there was a last visit:       14 Review of systems  are negative except for   Patient Active Problem List   Diagnosis     Bariatric surgery status     Cervical spondylosis with myelopathy     Memory loss     Encephalopathy     Cryptogenic generalized epilepsy (H)     Positive glutamic acid decarboxylase antibody     Alcoholism (H)     Ataxia     Chronic leg pain     Chronic pain of left knee     Dementia with behavioral disturbance     Depressive disorder     Diabetes mellitus without complication (H)     Diabetes mellitus type 2, controlled (H)     Displacement of cervical intervertebral disc without myelopathy     Edema     Gout, unspecified     Routine general medical examination at a health care facility     HTN (hypertension)     Hypercholesteremia     Hyperlipidemia     Knee pain, bilateral     Neuropathy     Muscle spasm of both lower legs     Obesity     ROSY (obstructive sleep apnea)     Pain medication agreement     Postgastric surgery syndromes     Presbyopia of both eyes     Restless legs syndrome (RLS)     Probably normal dopamine scan (DaTSCAN). 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)     Bilateral inguinal hernia without obstruction or gangrene     Right inguinal hernia     Sepsis (H)     Umbilical hernia without obstruction and without gangrene     Edema due to malnutrition, due to unspecified malnutrition type (H)     Nonsustained ventricular tachycardia     Parkinsonism, unspecified Parkinsonism type (H)     Ulcer of left lower extremity with fat layer exposed (H)     Depression, major, single episode, moderate (H)        Allergies   Allergen Reactions     Seasonal Allergies Itching, Rash and Visual Disturbance     Past Surgical History:   Procedure Laterality Date     Cervical Cord Decompression   2002     GI SURGERY      gastric bypass surgery     HIP SURGERY Left     left hip surgery     KNEE SURGERY Left 2008    left knee arthroplasty     Past Medical History:   Diagnosis Date     ACP (advance care planning) 11/29/2012    Formatting of this note might be different from the original. Brochure given     Alcoholism (H) 7/25/2011    Overview:  No use since 2011.  Formatting of this note might be different from the original. No use since 2011.     Altered behavior 9/5/2019     Altered mental status 9/5/2019     Ataxia 1/15/2014    Overview:  Falls  Formatting of this note might be different from the original. 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 Complex medical disorder with encephalopathy, ROSY, intermittent paralyses. Evaluation unremarkalble except for elevation of paraneoplastic antibodies, followed by neuroimmunology. MRI at [place] reportedly normal, EEG here with generalized epileptiform abnormalities. LEV started with cessation of spells. Breakthru Aug 2017, levetiracetam increase     Dehydration 1/15/2014    Formatting of this note is different from the original. CREATININE                (mg/dL)  Date Value  1/15/2014 1.88*   (previous Creatinine 1.2)     Dementia (H) 1/15/2014    Overview:  March 2013: abnormal neuropsych testing at [facility]. 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 March 2013: abnormal neuropsych testing at [facility]. 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  HEMOGLOBIN  A1C MONITORING (POCT) (% Total Hgb)  Date Value  9/5/2013 6.0   3/20/2013 6.7*  9/26/2012 5.7    Overview:  Formatting of this note might be different from the original.  HEMOGLOBIN A1C MONITORING (POCT) (% Total Hgb)  Date Value  9/5/2013 6.0   3/20/2013 6.7*  9/26/2012 5.7    Formatting of this note is different f     Diabetes mellitus without complication (H) 4/5/2006     Displacement of cervical intervertebral disc without myelopathy 10/5/2018    Overview:  2002 fall with disk herniation at C 6-7, cervical fusion. 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  Formatting of this note might be different from the original. 2002 fall with disk herniation at C 6-7, cervical fusion. 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     Edema 7/25/2011     Edema due to malnutrition, due to unspecified malnutrition type (H) 8/2/2021     Encephalopathy 12/10/2015     Gout, unspecified 6/8/2011     HTN (hypertension) 5/10/2012    Overview:  Lisinopril  Formatting of this note might be different from the original. Lisinopril     Hyperlipidemia 1/9/2018     Knee pain, bilateral 7/16/2011     Memory loss 8/18/2015     Muscle spasm of both lower legs 10/30/2014     Neuropathy 7/16/2011    Overview:  Gabapentin  Formatting of this note might be different from the original. Gabapentin     Nonsustained ventricular tachycardia 8/8/2022     Obesity 1/15/2014    Overview:  1/15/2014 Body mass index is 36.5 kg/(m^2).  Formatting of this note might be different from the original. 1/15/2014 Body mass index is 36.5 kg/(m^2).     ROSY (obstructive sleep apnea) 12/5/2012    Overview:  [facility] Sleep [facility] 11-28-12.  AHI 6.2  RDI 6.2  AHI REM 53.3  Oxygen Narayan 66%  Formatting of this note might be different from the original. Frannie Sleep [facility]o 11-28-12.  AHI 6.2  RDI 6.2  AHI REM 53.3  Oxygen Narayan 66%     Pain medication agreement 6/7/2011    Overview:  Pain Clinic: MS Contin and Vicodin. 120 vicodin a month.  Formatting of this note might  be different from the original. Pain Clinic: MS Contin and Vicodin. 120 vicodin a month.     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 mild asymmetry noted 2019     Restless legs syndrome (RLS) 2008     Right inguinal hernia 2021     Routine general medical examination at a health care facility 10/5/2018    Overview:  Colonoscopy Bone Denisty Last Lipids: Chol: 162    10/24/2006 T    10/24/2006 HDL:   32    10/24/2006 LDL:  90    10/24/2006 GLUCOSE                   (mg/dL)  Date             Value  2007          99  ---------- No results found for this basename:  PSASCREEN,PSADIAGNOSTI  Formatting of this note might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

## 2023-03-28 NOTE — PATIENT INSTRUCTIONS
Medications      6am 12p   6p 10/11pm     Albuterol proair proventil ventolin HFA inhaler  prn           Allopurinol zyloprim 100mg    3          Amoxicillin amoxil dental           Aspirin 81mg  1          Baclofen 10mg lioresal (Dr Galvez)  1     1      Carbidopa/levodopa sinemet 25/100 2  2   2     Cholecalciferol vitamin D3 2000 units    1         Clotrimazole lotrimin 1% cream  stopped           Compression stockings             Cyanocobalamin vitamin b12 1000mcg/ml injection  monthly            Desonide desowen 0.05% cream        Diclofenac voltaren 1% gel        Diphenhydramine-zinc acetate benadryl cream prn           Diphenhydramine HCL ex 2% topical prn           Donepezil aricept 10mg  stopped           Ferrous fumarate 65mg,  vit C 125mg vitron C    1 1       Fluoxetine prozac 20mg Off        Fluticasone propionate flonase NA 50 mcg prn           Furosemide lasix 40mg  2  1         Gabapentin neurontin 800mg (Dr Galvez) 1 1   1     PAM-KAMRON 8.6mg (senna senokot) no       HM vitamin D3 2000 units 50mcg above            Hydrocodone- acetaminophen norco 5-325mg prn           Ibuprofen advil/motrin 800mg no           Ipratropium albuterol duoneb neb prn       Ketoprofen 10% cream prn           Levetiracetam keppra 500mg  3     4     Lidocaine lidodermin 5% patch prn           Lorazepam ativan 1mg       prn     Magnesium 250mg (will review with Leonor)  1 1     Metformin glucophage XR 500mg 24hr    2     Methadone dolophine 5mg        1-2     Miralax polyethylene glycol  Prn        Naftifine naftin 2% gel daily           Neupro 3mg/24 hr pt24 below           Ondansetron zofran   stopped           Paroxetine paxil 40mg  off       Polyethylene glycol miralax above       Potassium chloride SA [place] con 20meq CR   1 1        Refresh optive advanced soln        Ropinirole requip 1mg  1 1 1 1 doll   Rotigotine Neupro 3mg/24 hr patch 1       doll   Simvastatin zocor 40mg        1     Tramadol ultram 50mg  Rarely prn           Triamcinolone kenalog 0.5% or 0.1% cream prn           Unable to find - diabetic shoes              Unable to find - syringe             Unable to find  gilmer/clon/hawa/imip/lido/pent 3-4/day            Valacyclovir valtrex 100mg prn               He is continuing on medications for RLS  Ferrous fumarate 65mg,[place] FE, vitamin C 125mg vitron C   Gabapentin neurontin 800mg 3/day  Ropinirole requip 1mg 4/day   Rotigotine Neupro 3mg/24 hr patch  He may want to talk with his pcp about a recent study about dipyridamole and its b enefit on RLS  Here are the doses that were used in the study   Dipyridamole 100mg/day x 1 week then 200mg/day x 1 week and if needed 300mg/day         He has been a bit irritable that may be related to his cognitive impairment and aggravated by mood issues as well as possibly keppra - they may want to talk with Dr. Owens about if 3500mg/day of keppra is needed or if he can get by with less.     Plan:    RLS medications  From Dr. Maddox  Ferrous fumarate 65mg,[place] FE, vitamin C 125mg vitron C   Gabapentin neurontin 800mg 3/day  Ropinirole requip 1mg 4/day   Rotigotine Neupro 3mg/24 hr patch    Seizures - Dr. Owens 5/23/2023 return visit   Continuing Levetiracetam keppra 500mg    Has mood issues and is not taking a selective serotonin reuptake inhibitor  He had been on paroxetine and fluoxetine in the past  Recommend restarting the paroxetine and titrate the dose back up starting with 10mg and then increase weekly to 40mg    Gait disorder  Dopamine scan [facility] - 5/23/2019  A presynaptic dopaminergic deficit not present. Some asymmetry noted.   sinemet carbidopa/levodopa 2 tabs 3/day    Cognitive disorder - stable off donepezil     Constipation continues and was encouraged to use senokot S or/and polyethylene glycol miralax    Family history of prostate cancer  On a diuretic 2 x 40mg and 40mg - and has frequent urination  Will need a discussion about prostate  cancer with pcp or urologist (referral placed if needed)    Return back    Continuing on sinemet     12/2021 was last visit with me    Return back in person 1 year

## 2023-03-30 ENCOUNTER — TELEPHONE (OUTPATIENT)
Dept: NEUROLOGY | Facility: CLINIC | Age: 68
End: 2023-03-30
Payer: COMMERCIAL

## 2023-03-30 NOTE — TELEPHONE ENCOUNTER
6 month follow up with    -Spoke to pt wife Regina stated she will call to make this appointment 3/30/23 NP

## 2023-04-04 ENCOUNTER — OFFICE VISIT (OUTPATIENT)
Dept: VASCULAR ULTRASOUND | Facility: CLINIC | Age: 68
End: 2023-04-04
Attending: SPECIALIST
Payer: COMMERCIAL

## 2023-04-04 VITALS
DIASTOLIC BLOOD PRESSURE: 60 MMHG | SYSTOLIC BLOOD PRESSURE: 128 MMHG | HEART RATE: 76 BPM | RESPIRATION RATE: 12 BRPM | TEMPERATURE: 98.2 F

## 2023-04-04 DIAGNOSIS — M79.89 LEG SWELLING: Primary | ICD-10-CM

## 2023-04-04 DIAGNOSIS — I83.892 SYMPTOMATIC VARICOSE VEINS OF LEFT LOWER EXTREMITY: ICD-10-CM

## 2023-04-04 DIAGNOSIS — I83.892 SYMPTOMATIC VARICOSE VEINS, LEFT: ICD-10-CM

## 2023-04-04 DIAGNOSIS — I87.2 VENOUS (PERIPHERAL) INSUFFICIENCY: ICD-10-CM

## 2023-04-04 DIAGNOSIS — L97.922 ULCER OF LEFT LOWER EXTREMITY WITH FAT LAYER EXPOSED (H): ICD-10-CM

## 2023-04-04 PROCEDURE — 250N000009 HC RX 250: Performed by: SPECIALIST

## 2023-04-04 PROCEDURE — C1886 CATHETER, ABLATION: HCPCS

## 2023-04-04 PROCEDURE — 250N000011 HC RX IP 250 OP 636: Performed by: SPECIALIST

## 2023-04-04 PROCEDURE — 258N000003 HC RX IP 258 OP 636: Performed by: SPECIALIST

## 2023-04-04 PROCEDURE — 36475 ENDOVENOUS RF 1ST VEIN: CPT | Mod: LT | Performed by: SPECIALIST

## 2023-04-04 PROCEDURE — C1894 INTRO/SHEATH, NON-LASER: HCPCS

## 2023-04-04 RX ORDER — LIDOCAINE HYDROCHLORIDE 20 MG/ML
5 INJECTION, SOLUTION INFILTRATION; PERINEURAL ONCE
Status: COMPLETED | OUTPATIENT
Start: 2023-04-04 | End: 2023-04-04

## 2023-04-04 RX ADMIN — LIDOCAINE HYDROCHLORIDE 5 ML: 20 INJECTION, SOLUTION INFILTRATION; PERINEURAL at 14:52

## 2023-04-04 RX ADMIN — LIDOCAINE HYDROCHLORIDE: 10 INJECTION, SOLUTION EPIDURAL; INFILTRATION; INTRACAUDAL; PERINEURAL at 14:53

## 2023-04-04 ASSESSMENT — PAIN SCALES - GENERAL: PAINLEVEL: NO PAIN (0)

## 2023-04-04 NOTE — PATIENT INSTRUCTIONS
Discharge Instructions Following Venous Closure  Today, you had this procedure done:   - Vein closure using RadioFrequency Ablation (RFA)    Dressing    Leakage from the numbing medications the first few hours is normal if you had an RFA.     Wear your thigh high compression for 48 hours continuously until your ultrasound after the procedure.  It is ok to remove your stocking to shower in 24 hours but then reapply after.    Wear the thigh high stocking for two weeks after the procedure while you are up. It is ok to take off when you sleep.     After two weeks, you can transition into compression stockings of your choice.     Activity    On the day of the procedure, make sure to walk around the house for a few minutes each hour until bedtime.    The day after the procedure you should advance activity as tolerated.  NO strenuous activities though for 2 weeks.  In general, avoid prolonged standing in place and sitting with your legs down.  Both activities will cause blood to pool in your legs resulting in more swelling and discomfort.    Do not drive or operate heavy machinery for 24 hours after the procedure (excludes if you had a VenaSeal).     Do not take baths or use hot tubs or swimming pools until your incision site is healed.    Do not fly for the next 3 weeks.    Do not lift > 20 lbs. for 2 weeks.     Post Procedure Ultrasound & Follow-up  You will need an ultrasound within 2-3 days following the closure procedure.  Then plan to see your surgeon in the office six weeks after your procedure. Call us to schedule this appointment if one has not already been made.  Post Procedure Signs and Symptoms  There can be a mild amount of bruising and numbness after this procedure.  This will typically take a few weeks to fade.  You may feel pain or tenderness in your inner thigh.  Mild pain medication such as Tylenol or Ibuprofen maybe helpful.  It is normal to have fluid leaking from the injection areas the day of the  procedure and it may be blood tinged.      Call your healthcare provider if you have any of the following:    Fever of 100.4 F (38 C) or higher, or as directed by your provider    Chest pain or trouble breathing    Signs of infection at the catheter insertion site. These include increased redness or swelling (inflammation), warmth, increasing pain, bleeding, or bad-smelling discharge.    Severe numbness or tingling in the treated leg    Severe pain or swelling in the treated leg  For emergencies after 4:30pm Monday - Friday, holidays and weekends:  Dr. Benjamin Salinas, [facility] at [phone]  Dr. Jose Jewell, [facility] [place] [facility] at [phone]  Thank you for allowing us to be part of your care

## 2023-04-06 ENCOUNTER — ANCILLARY PROCEDURE (OUTPATIENT)
Dept: VASCULAR ULTRASOUND | Facility: CLINIC | Age: 68
End: 2023-04-06
Attending: SPECIALIST
Payer: COMMERCIAL

## 2023-04-06 DIAGNOSIS — L97.922 ULCER OF LEFT LOWER EXTREMITY WITH FAT LAYER EXPOSED (H): ICD-10-CM

## 2023-04-06 DIAGNOSIS — I83.892 SYMPTOMATIC VARICOSE VEINS, LEFT: ICD-10-CM

## 2023-04-06 PROCEDURE — 93971 EXTREMITY STUDY: CPT | Mod: 26 | Performed by: SURGERY

## 2023-04-06 PROCEDURE — 93971 EXTREMITY STUDY: CPT | Mod: LT

## 2023-04-18 DIAGNOSIS — G20.C PARKINSONISM, UNSPECIFIED PARKINSONISM TYPE (H): ICD-10-CM

## 2023-04-18 RX ORDER — CARBIDOPA AND LEVODOPA 25; 100 MG/1; MG/1
TABLET ORAL
Qty: 180 TABLET | Refills: 11 | Status: SHIPPED | OUTPATIENT
Start: 2023-04-18 | End: 2024-05-06

## 2023-04-18 NOTE — TELEPHONE ENCOUNTER
Dr. Ochoa only manages Nils's carbidopa/levodopa. He should have the rest of the requested medications filled by his primary care provider.

## 2023-04-18 NOTE — TELEPHONE ENCOUNTER
[facility] Call Center    Phone Message    May a detailed message be left on voicemail: yes     Reason for Call: Medication Question or concern regarding medication   Prescription Clarification  Name of Medication: PARoxetine (PAXIL) 10 MG tablet, cholecalciferol (HM VITAMIN D3) 50 MCG (2000 UT) CAPS, magnesium 250 MG tablet, potassium chloride ER (KLOR-CON M) 20 MEQ CR tablet, carbidopa-levodopa (SINEMET)  MG tablet  Prescribing Provider: Garry Ochoa MD   Pharmacy: [facility] [address] [place] [address]    What on the order needs clarification? Pt's wife Regina is calling because pt had a telephone visit with Dr. Ochoa on 03/28 and some Rx were supposed to be sent to [facility]. Regina called the pharmacy and they don't have any of these Rx's. She would like Dr. Ochoa to send the Rx's again.     Also, pt has been out of the carbidopa-levodopa (SINEMET)  MG tablet and Regina is wondering if pt is supposed to get that refilled as well. Please call Regina once Rx's are sent and to discuss the Carbidopa      Action Taken: Message routed to:  [facility] ([facility] Neurology    Travel Screening: Not Applicable

## 2023-05-09 ENCOUNTER — OFFICE VISIT (OUTPATIENT)
Dept: ONCOLOGY | Facility: CLINIC | Age: 68
End: 2023-05-09
Attending: STUDENT IN AN ORGANIZED HEALTH CARE EDUCATION/TRAINING PROGRAM
Payer: COMMERCIAL

## 2023-05-09 ENCOUNTER — TELEPHONE (OUTPATIENT)
Dept: VASCULAR SURGERY | Facility: CLINIC | Age: 68
End: 2023-05-09
Payer: COMMERCIAL

## 2023-05-09 VITALS
HEART RATE: 61 BPM | SYSTOLIC BLOOD PRESSURE: 165 MMHG | BODY MASS INDEX: 33.34 KG/M2 | WEIGHT: 220 LBS | DIASTOLIC BLOOD PRESSURE: 87 MMHG | OXYGEN SATURATION: 94 % | HEIGHT: 68 IN | RESPIRATION RATE: 18 BRPM

## 2023-05-09 DIAGNOSIS — N40.1 BENIGN PROSTATIC HYPERPLASIA WITH URINARY FREQUENCY: ICD-10-CM

## 2023-05-09 DIAGNOSIS — Z80.42 FAMILY HISTORY OF PROSTATE CANCER IN FATHER: Primary | ICD-10-CM

## 2023-05-09 DIAGNOSIS — R35.0 BENIGN PROSTATIC HYPERPLASIA WITH URINARY FREQUENCY: ICD-10-CM

## 2023-05-09 LAB
ALBUMIN UR-MCNC: 30 MG/DL
APPEARANCE UR: CLEAR
BILIRUB UR QL STRIP: NEGATIVE
COLOR UR AUTO: ABNORMAL
GLUCOSE UR STRIP-MCNC: NEGATIVE MG/DL
HGB UR QL STRIP: NEGATIVE
HYALINE CASTS: 13 /LPF
KETONES UR STRIP-MCNC: ABNORMAL MG/DL
LEUKOCYTE ESTERASE UR QL STRIP: NEGATIVE
MUCOUS THREADS #/AREA URNS LPF: PRESENT /LPF
NITRATE UR QL: NEGATIVE
PH UR STRIP: 5 [PH] (ref 5–7)
RBC URINE: 1 /HPF
SP GR UR STRIP: 1.02 (ref 1–1.03)
UROBILINOGEN UR STRIP-MCNC: <2 MG/DL
WBC URINE: <1 /HPF

## 2023-05-09 PROCEDURE — 81003 URINALYSIS AUTO W/O SCOPE: CPT | Performed by: STUDENT IN AN ORGANIZED HEALTH CARE EDUCATION/TRAINING PROGRAM

## 2023-05-09 PROCEDURE — 99204 OFFICE O/P NEW MOD 45 MIN: CPT | Mod: 25 | Performed by: STUDENT IN AN ORGANIZED HEALTH CARE EDUCATION/TRAINING PROGRAM

## 2023-05-09 PROCEDURE — G0463 HOSPITAL OUTPT CLINIC VISIT: HCPCS | Performed by: STUDENT IN AN ORGANIZED HEALTH CARE EDUCATION/TRAINING PROGRAM

## 2023-05-09 PROCEDURE — 51798 US URINE CAPACITY MEASURE: CPT | Performed by: STUDENT IN AN ORGANIZED HEALTH CARE EDUCATION/TRAINING PROGRAM

## 2023-05-09 RX ORDER — TAMSULOSIN HYDROCHLORIDE 0.4 MG/1
0.4 CAPSULE ORAL DAILY
Qty: 90 CAPSULE | Refills: 3 | Status: SHIPPED | OUTPATIENT
Start: 2023-05-09 | End: 2024-05-03

## 2023-05-09 ASSESSMENT — PAIN SCALES - GENERAL: PAINLEVEL: SEVERE PAIN (7)

## 2023-05-09 NOTE — TELEPHONE ENCOUNTER
Caller: Patient and wife    Provider: MD Benjamin Salinas    Detailed reason for call: Patient has been having pain in his left leg behind the knee that has been getting worse since yesterday afternoon. He had venous closure with Dr. Salinas 4/4 and is scheduled next week for 6 week follow up.    Best phone number to contact: Wife cell (Regina): [phone]    Best time to contact: Any    Ok to leave a detailed message: Yes    Ok to speak to authorized person if needed: Yes: Regina       (Noted to patient if reason is related to wound or incision, to please send a photo via email or [facility].)

## 2023-05-09 NOTE — TELEPHONE ENCOUNTER
Spoke with patients wife Regina. Patient lost his balance and has had pain left leg since with some tightness. Pain is a 9/10 on pain scale to 10. No redness,fever or swelling. Advised to send a photo if has any veins or redness.  Patient has cognitive issue with explaining symptoms. I offered an ultrasound to r/o DVT. Advised to go to ED if ibuprofen and ice doesn't help.Patient has a urology apt his wife wants to keep. Call back with further concerns. Will f/u next week.
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
## 2023-05-09 NOTE — PROGRESS NOTES
"Oncology Rooming Note    May 9, 2023 1:41 PM   Jose Loco is a 67 year old male who presents for:    Chief Complaint   Patient presents with     urology clinic     New consult family history of prostate cancer     Initial Vitals: BP (!) 165/87   Pulse 61   Resp 18   Ht 1.727 m (5' 8\")   Wt 99.8 kg (220 lb)   SpO2 94%   BMI 33.45 kg/m   Estimated body mass index is 33.45 kg/m  as calculated from the following:    Height as of this encounter: 1.727 m (5' 8\").    Weight as of this encounter: 99.8 kg (220 lb). Body surface area is 2.19 meters squared.  Severe Pain (7) Comment: Data Unavailable   No LMP for male patient.  Allergies reviewed: Yes  Medications reviewed: Yes    Medications: Medication refills not needed today.  [facility] [place] [address]    Clinical concerns:  New consult family history prostate cancer    Arina Winchester            "
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
## 2023-05-20 DIAGNOSIS — G40.309: Primary | ICD-10-CM

## 2023-05-24 NOTE — TELEPHONE ENCOUNTER
LEVETIRACETAM 500MG TABS  Last Written Prescription Date:  1/20/23  Last Fill Quantity: 215,   # refills: 3  Last Office Visit : 1/20/23  Future Office visit:  5/26/23    Routing refill request to provider for review/approval because:   Has appt 5/26/23 1/20/23:   Keppra (Levetiracetam) Level 10.0 - 40.0  g/mL 64.3 High        1/20/23Plan:   1) Continue levetiracetam 1500 mg in AM and 2000 mg in PM. Refilled Rx today.  2) levetiracetam level today to rule out toxicity.  3) Urged follow up with primary care to consider reinitiation of selective serotonin reuptake inhibitor. Continue followup with neuroimmunology and movement disorders.  4) VPA or ESM may be appropriate medications to consider next for seizures. Lamotrigine could possibly help with mood but may be more challenging to initiate in this situation.

## 2023-05-25 RX ORDER — LEVETIRACETAM 500 MG/1
TABLET ORAL
Qty: 217 TABLET | Refills: 1 | Status: SHIPPED | OUTPATIENT
Start: 2023-05-25 | End: 2023-09-01

## 2023-06-01 ENCOUNTER — HEALTH MAINTENANCE LETTER (OUTPATIENT)
Age: 68
End: 2023-06-01

## 2023-06-09 ENCOUNTER — PATIENT OUTREACH (OUTPATIENT)
Dept: ONCOLOGY | Facility: CLINIC | Age: 68
End: 2023-06-09
Payer: COMMERCIAL

## 2023-06-09 NOTE — PROGRESS NOTES
Called Nils and spoke with his wife, Rgeina,consent to communicate is on file. Called to remind that Nils that Dr. Ortega placed order for PSA that is anticipated to be drawn in the next week or so. No lab apt has yet been scheduled. They would like to schedule and apt at our clinic. Warm transfer was unsuccessful as schedulers were busy . Will ask scheduling team to call her back to assist with scheduling. IB message sent, she would prefer call on home phone. She assists Nils with scheduling apts as Nils has early onset dementia/Pily Sharma, KEENA  Lab has been scheduled on 6/14/23  
none

## 2023-06-28 ENCOUNTER — LAB (OUTPATIENT)
Dept: INFUSION THERAPY | Facility: CLINIC | Age: 68
End: 2023-06-28
Attending: STUDENT IN AN ORGANIZED HEALTH CARE EDUCATION/TRAINING PROGRAM
Payer: COMMERCIAL

## 2023-06-28 DIAGNOSIS — Z80.42 FAMILY HISTORY OF PROSTATE CANCER IN FATHER: ICD-10-CM

## 2023-06-28 DIAGNOSIS — R35.0 BENIGN PROSTATIC HYPERPLASIA WITH URINARY FREQUENCY: ICD-10-CM

## 2023-06-28 DIAGNOSIS — N40.1 BENIGN PROSTATIC HYPERPLASIA WITH URINARY FREQUENCY: ICD-10-CM

## 2023-06-28 LAB — PSA SERPL DL<=0.01 NG/ML-MCNC: 0.9 NG/ML (ref 0–4.5)

## 2023-06-28 PROCEDURE — 36415 COLL VENOUS BLD VENIPUNCTURE: CPT

## 2023-06-28 PROCEDURE — 84153 ASSAY OF PSA TOTAL: CPT

## 2023-08-29 DIAGNOSIS — G40.309: ICD-10-CM

## 2023-09-01 RX ORDER — LEVETIRACETAM 500 MG/1
TABLET ORAL
Qty: 217 TABLET | Refills: 1 | Status: SHIPPED | OUTPATIENT
Start: 2023-09-01 | End: 2023-11-02

## 2023-09-01 NOTE — TELEPHONE ENCOUNTER
LEVETIRACETAM 500MG TABS   Last Written Prescription Date:  5/25/2023  Last Fill Quantity: 217,   # refills: 1  Last Office Visit :  1/23/2023  Future Office visit:  9/29/2023 1/20/2023  Keppra (Levetiracetam) Level 10.0 - 40.0  g/mL 64.3 High    Janie Méndez Level high.  Okay to fill med for Pt care?  Please review  and send order per Providers recommendations for Pt care.     Sakina Johnson RN  Central Triage Red Flags/Med Refills

## 2023-09-01 NOTE — TELEPHONE ENCOUNTER
Jose Loco's wife is calling requesting a refill on medication. Patient is out of medication. Spouse is concerned about patient being out of medication over the weekend.     levETIRAcetam (KEPPRA) 500 MG tablet       [facility], [place], [place] - [place] - [address]  [address]  [facility] [address]  Phone: [phone] Fax: [phone]

## 2023-09-03 ENCOUNTER — HEALTH MAINTENANCE LETTER (OUTPATIENT)
Age: 68
End: 2023-09-03

## 2023-09-27 ENCOUNTER — OFFICE VISIT (OUTPATIENT)
Dept: VASCULAR SURGERY | Facility: CLINIC | Age: 68
End: 2023-09-27
Attending: SPECIALIST
Payer: COMMERCIAL

## 2023-09-27 VITALS
DIASTOLIC BLOOD PRESSURE: 78 MMHG | RESPIRATION RATE: 16 BRPM | HEART RATE: 76 BPM | SYSTOLIC BLOOD PRESSURE: 118 MMHG | TEMPERATURE: 97.8 F | OXYGEN SATURATION: 92 %

## 2023-09-27 DIAGNOSIS — M79.89 LEG SWELLING: Primary | ICD-10-CM

## 2023-09-27 DIAGNOSIS — I83.892 SYMPTOMATIC VARICOSE VEINS, LEFT: ICD-10-CM

## 2023-09-27 PROCEDURE — 99213 OFFICE O/P EST LOW 20 MIN: CPT | Performed by: SPECIALIST

## 2023-09-27 PROCEDURE — G0463 HOSPITAL OUTPT CLINIC VISIT: HCPCS | Performed by: SPECIALIST

## 2023-09-27 NOTE — PATIENT INSTRUCTIONS
Follow up with Dr Light for wound care as needed.    Swelling and Compression Therapy    Swelling in the legs can be caused by many reasons. No matter what the reason, treatment usually includes some type of compression. This may be done with a support sock, dressings, short stretch bandaging, velcro compression or layered wraps.     It is important to treat the swelling for many reasons. If the swelling is not treated you may develop blisters that can lead to ulcerations. This is caused when extra fluid goes into tissue causing damage and blocking blood flow to the tissue.     It is important that you wear your compression every day, including days that you will be seen in clinic.     Compression is often the most important part of treating leg wounds. Without controlling the swelling it is often not possible to heal wounds.     Going without compression for even brief periods of time can be damaging to your legs and your health.  Your compression should be put on first thing in the morning. Take the compression off at night only when instructed by your care provider to do so. Sometimes wearing compression 24 hours a day will be recommended.       If you are having difficulty wearing your compression it is important to notify your care provider so that other options may be reviewed.    Please call us if you have any questions [phone].    Thank you for choosing [facility].       You can resume your normal activities. It is important to remember that venous reflux disease is a life-long disease. You should wear compression as much as you can. It is especially important to wear compression with long periods of sitting, standing, long car rides or if you will be flying. Compression socks should get refilled every 4-6 months. If you do a lot of standing it is good to do calf raises to help keep the blood pumping. If you sit a lot at work, it is good to get up periodically to walk around.  "Elevation of the foot of your bed 4-6\" helps the blood return back to where it is needed. They do not need to be worn at night while in bed. We can refill your compression prescription for 1 year otherwise your primary is able to refill them for you.    Call us with any problems or questions: [phone]  Thank you for entrusting us with your care.      Please bring your prescription to a home medical supply store. Here is a list of locations but not limited to.     [facility]  [address] [place] [address]  Phone: [phone]  Fax: [phone] [facility] Medical Bldg.  [address] [place] [address]  Phone: [phone]  Fax: [phone]   [facility] Professional Bldg.  [address] [place] [address]  Phone: [phone]  Fax: [phone] [facility]  [address]  [address] [place] [address]  Phone: [phone]  Fax: [phone]   [facility]  [address] [place] [address]  Phone: [phone] [facility]   [address] ([facility])  [place] [address]  Phone: [phone]   [place]  [address] [place] [address]      Phone: [phone]  Fax: [phone] [place]  [address] [place] [address]      Phone: [phone]  Fax: [phone]     [facility] [email or web address]  [address], [place] [address] [phone]    [facility]  [email or web address]  [address] [place] [address]  Phone: [phone]     [address] [place] [address]  Phone: [phone]    [address] [place] [address]  Phone: [phone]    [address], [place] [address]  Phone: " [phone]    [address], [place] [address]  Phone: [phone]    [facility] [email or web address]  [address] [place] [address]  Phone: [phone]    [address], [place] [address]  Phone: [phone] [address] [place] [address]  Phone: [phone]    Carmela Valdes  [email or web address]  [phone]

## 2023-09-27 NOTE — PROGRESS NOTES
[facility]        Patient is here for a L GSV RFA 4/4/23. Hx LLE ulcer, was following Dr. Light previously. Pt called 5/9 c/o pain behind left knee.  The patient has varicose veins that are problematic in both legs. Continue compression socks return to Dr Salinas PRN. Follow up with Dr Light as needed for wound care.    Leg swelling through out the day per pt. Balance is not real good per pt because of their legs. Pt takes Gabapentin and pain medication to help manage.   Wears compression stockings daily.     Pacemaker placed at [facility] in August

## 2023-09-29 NOTE — PROGRESS NOTES
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 As needed      furosemide (LASIX) 40 MG tablet 2 x 40mg tab by mouth at 6am and 40mg tab by mouth at noon = 3/day 270 tablet 3    gabapentin (NEURONTIN) 800 MG tablet 800mg tab by mouth 3/day @ 6am, 12noon, 11pm      PAM-KAMRON 8.6 MG tablet TAKE ONE TO TWO TABLETS BY MOUTH TWICE A DAY AS NEEDED (Patient not taking: Reported on 9/27/2023)      HYDROcodone-acetaminophen (NORCO) 5-325 MG per tablet as needed      ipratropium - albuterol 0.5 mg/2.5 mg/3 mL (DUONEB) 0.5-2.5 (3) MG/3ML neb solution INHALE THREE MLS BY MOUTH EVERY 6 HOURS AS NEEDED FOR WHEEZING (Patient not taking: Reported on 9/27/2023)      Ketoprofen 10 % CREA Externally apply 1 Tube topically 2 times daily (Patient not taking: Reported on 9/27/2023) 1 Tube 1    levETIRAcetam (KEPPRA) 500 MG tablet TAKE 3 TABLETS BY MOUTH EVERY MORNING AND 4 TABLETS IN EVENING (Patient not taking: Reported on 9/27/2023) 217 tablet 1    lidocaine (LIDODERM) 5 % patch Place 1 patch onto the skin daily as needed for moderate pain Reported on 3/21/2017      LORazepam (ATIVAN) 1 MG tablet 1mg tab by mouth nightly as needed (Patient not taking: Reported on 9/27/2023)      magnesium 250 MG tablet Take 1  tablet (250 mg) by mouth 2 times daily      metFORMIN (GLUCOPHAGE-XR) 500 MG 24 hr tablet START WITH TAKE ONE TABLET BY MOUTH EVERY DAY WITH SUPPER FOR TWO WEEKS THEN TAKE TWO TABLETS BY MOUTH EVERY DAY WITH SUPPER      methadone (DOLOPHINE) 5 MG tablet 1-2 x 5mg tab by mouth nightly 1 tablet 0    MIRALAX 17 g packet MIX AND DRINK 17 GRAMS BY MOUTH OR NG-TUBE ONCE DAILY AS NEEDED      Naftifine (NAFTIN) 2 % GEL Apply topically daily Reported on 3/21/2017 (Patient not taking: Reported on 9/27/2023)      NONFORMULARY Diabetic Shoe      PARoxetine (PAXIL) 10 MG tablet 10mg daily for 1 week then 20mg daily for 1 week then 30mg daily for 1 week then 40mg daily (Patient not taking: Reported on 9/27/2023) 120 tablet 11    polyethylene glycol (MIRALAX) 17 GM/Dose powder Take 17 g by mouth daily as needed for constipation      potassium chloride ER (KLOR-CON M) 20 MEQ CR tablet 20meq tab by mouth twice daily      REFRESH OPTIVE ADVANCED 0.5-1-0.5 % SOLN PLACE ONE DROP INTO THE EYE(S) 5 TIMES DAILY AS NEEDED FOR DRY EYES      rOPINIRole (REQUIP) 1 MG tablet 1mg tab by mouth 4/day @ 6am, 12noon, 6pm, and 10-11pm 360 tablet 3    rotigotine (NEUPRO) 3 MG/24HR 24 hr patch Place 1 patch onto the skin daily 90 patch 3    simvastatin (ZOCOR) 40 MG tablet 40mg tab by mouth at bedtime      tamsulosin (FLOMAX) 0.4 MG capsule Take 1 capsule (0.4 mg) by mouth daily for 360 days 90 capsule 3    triamcinolone (KENALOG) 0.1 % external cream As needed (Patient not taking: Reported on 9/27/2023)      valACYclovir (VALTREX) 1000 mg tablet Take 1 tablet (1,000 mg) by mouth daily as needed      venlafaxine (EFFEXOR XR) 75 MG 24 hr capsule Take 75 mg by mouth daily           Garry Ochoa MD

## 2023-10-05 ENCOUNTER — OFFICE VISIT (OUTPATIENT)
Dept: NEUROLOGY | Facility: CLINIC | Age: 68
End: 2023-10-05
Payer: COMMERCIAL

## 2023-10-05 VITALS — SYSTOLIC BLOOD PRESSURE: 135 MMHG | OXYGEN SATURATION: 93 % | HEART RATE: 61 BPM | DIASTOLIC BLOOD PRESSURE: 73 MMHG

## 2023-10-05 DIAGNOSIS — G20.C PARKINSONISM, UNSPECIFIED PARKINSONISM TYPE (H): Primary | ICD-10-CM

## 2023-10-05 PROCEDURE — 99213 OFFICE O/P EST LOW 20 MIN: CPT | Performed by: PSYCHIATRY & NEUROLOGY

## 2023-10-05 ASSESSMENT — PAIN SCALES - GENERAL: PAINLEVEL: EXTREME PAIN (8)

## 2023-10-05 NOTE — LETTER
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 mouth twice daily @ noon and 6pm (Patient not taking: Reported on 9/27/2023)      FLUoxetine (PROZAC) 20 MG capsule Take 20 mg by mouth every morning      fluticasone (FLONASE) 50 MCG/ACT nasal spray As needed      furosemide (LASIX) 40 MG tablet 2 x 40mg tab by mouth at 6am and 40mg tab by mouth at noon = 3/day 270 tablet 3    gabapentin (NEURONTIN) 800 MG tablet 800mg tab by mouth 3/day @ 6am, 12noon, 11pm      PAM-KAMRON 8.6 MG tablet TAKE ONE TO TWO TABLETS BY MOUTH TWICE A DAY AS NEEDED (Patient not taking: Reported on 9/27/2023)      HYDROcodone-acetaminophen (NORCO) 5-325 MG per tablet as needed      ipratropium - albuterol 0.5 mg/2.5 mg/3 mL (DUONEB) 0.5-2.5 (3) MG/3ML neb solution INHALE THREE MLS BY MOUTH EVERY 6 HOURS AS NEEDED FOR WHEEZING (Patient not taking: Reported on 9/27/2023)      Ketoprofen 10 % CREA Externally apply 1 Tube topically 2 times daily (Patient not taking: Reported on 9/27/2023) 1 Tube 1    levETIRAcetam (KEPPRA) 500 MG tablet TAKE 3 TABLETS BY MOUTH EVERY MORNING AND 4 TABLETS IN EVENING (Patient not taking:  Reported on 9/27/2023) 217 tablet 1    lidocaine (LIDODERM) 5 % patch Place 1 patch onto the skin daily as needed for moderate pain Reported on 3/21/2017      LORazepam (ATIVAN) 1 MG tablet 1mg tab by mouth nightly as needed (Patient not taking: Reported on 9/27/2023)      magnesium 250 MG tablet Take 1 tablet (250 mg) by mouth 2 times daily      metFORMIN (GLUCOPHAGE-XR) 500 MG 24 hr tablet START WITH TAKE ONE TABLET BY MOUTH EVERY DAY WITH SUPPER FOR TWO WEEKS THEN TAKE TWO TABLETS BY MOUTH EVERY DAY WITH SUPPER      methadone (DOLOPHINE) 5 MG tablet 1-2 x 5mg tab by mouth nightly 1 tablet 0    MIRALAX 17 g packet MIX AND DRINK 17 GRAMS BY MOUTH OR NG-TUBE ONCE DAILY AS NEEDED      Naftifine (NAFTIN) 2 % GEL Apply topically daily Reported on 3/21/2017 (Patient not taking: Reported on 9/27/2023)      NONFORMULARY Diabetic Shoe      PARoxetine (PAXIL) 10 MG tablet 10mg daily for 1 week then 20mg daily for 1 week then 30mg daily for 1 week then 40mg daily (Patient not taking: Reported on 9/27/2023) 120 tablet 11    polyethylene glycol (MIRALAX) 17 GM/Dose powder Take 17 g by mouth daily as needed for constipation      potassium chloride ER (KLOR-CON M) 20 MEQ CR tablet 20meq tab by mouth twice daily      REFRESH OPTIVE ADVANCED 0.5-1-0.5 % SOLN PLACE ONE DROP INTO THE EYE(S) 5 TIMES DAILY AS NEEDED FOR DRY EYES      rOPINIRole (REQUIP) 1 MG tablet 1mg tab by mouth 4/day @ 6am, 12noon, 6pm, and 10-11pm 360 tablet 3    rotigotine (NEUPRO) 3 MG/24HR 24 hr patch Place 1 patch onto the skin daily 90 patch 3    simvastatin (ZOCOR) 40 MG tablet 40mg tab by mouth at bedtime      tamsulosin (FLOMAX) 0.4 MG capsule Take 1 capsule (0.4 mg) by mouth daily for 360 days 90 capsule 3    triamcinolone (KENALOG) 0.1 % external cream As needed (Patient not taking: Reported on 9/27/2023)      valACYclovir (VALTREX) 1000 mg tablet Take 1 tablet (1,000 mg) by mouth daily as needed      venlafaxine (EFFEXOR XR) 75 MG 24 hr capsule Take 75  mg by mouth daily           Garry Ochoa MD

## 2023-10-05 NOTE — PATIENT INSTRUCTIONS
Medications      6am 12p   6p 10/11pm     Albuterol proair proventil ventolin inhaler  prn           Allopurinol zyloprim 100mg    3          Amoxicillin amoxil dental           Aspirin 81mg   1          Baclofen 10mg lioresal (Dr Galvez)  1     1      Carbidopa/levodopa sinemet 25/100 2  2   2     Cholecalciferol vit D3 2000 units    1         Clotrimazole lotrimin 1% cream  stopped           Compression stockings             Cyanocobalamin vitamin b12 1000mcg/ml  monthly            Desonide desowen 0.05% cream             Diclofenac voltaren 1% gel             Diphenhydramine-zinc acet benadryl cream prn           Diphenhydramine HCL ex 2% topical prn           Donepezil aricept 10mg  stopped           Ferrous fum 65mg,  vit C 125mg vitron C   1 1       Fluoxetine prozac 20mg Off           Fluticasone propionate flonase NA 50 mcg prn           Furosemide lasix 40mg  2  1         Gabapentin neurontin 800mg (Dr Galvez) 1 1   1     PAM-KAMRON 8.6mg (senna senokot) no           Hydrocodone- acetaminophen norco 5-325mg prn           Ipratropium albuterol duoneb neb prn           Ketoprofen 10% cream prn           Levetiracetam keppra 500mg 3     4     Lidocaine lidodermin 5% patch prn           Lorazepam ativan 1mg      prn     Magnesium 250mg (review with Leonor)   1 1       Metformin glucophage XR 500mg 24hr      2       Methadone dolophine 5mg        1-2     Miralax polyethylene glycol  Prn            Naftifine naftin 2% gel prn           Neupro 3mg/24 hr pt24 below           Ondansetron zofran   stopped           Paroxetine paxil 10mg  titration           Polyethylene glycol miralax above           Potassium chloride K[place] con 20meq CR    1 1        Refresh optive advanced soln             Ropinirole requip 1mg  1 1 1 1 doll   Rotigotine Neupro 3mg/24 hr patch 1       doll   Simvastatin zocor 40mg        1     Tamsulosin flomax 0.4mg 1           Tramadol ultram 50mg Rarely prn           Triamcinolone  kenalog 0.5% or 0.1% cream prn           Unable to find - diabetic shoes              Unable to find - syringe             Unable to find  gilmer/clon/hawa/imip/lido/pent 3-4/day            Valacyclovir valtrex 100mg prn           Venlafaxine effexor XR 75mg 24hr  1                              Plan:    5/10/2023 had a fall and went in for a scan  He has a new pacemaker     Bladder issues  Has had blood monitoring for prostate cancer  Has a return appointment coming up  [facility]es [facility] at [facility]  He is on flomax  He is on allopurinol    He continues on seizure medication - levetiracetam    RLS   He continues on   Ropinirole requip 1mg 4/day   He is not taking the long acting roprinirole requip XL 4mg  - tier 1 or 6mg or 8am and see if can get off the rotigotine patch  He is also on Rotigotine neupro 3mg patch - tier 2  He is taking gabapentin neurontin 800mg 3/day  He is taking iron  Medications coming from Dr. Maddox - seen in [place] - next appointment December 2023    Pharmacy (MT) consultation and medication management  Please call the scheduling number I@ [phone] to set up an appointment with pharmacists Rhonda Arzate or Elda Rausch.     Would recommend a slow wean off the sinemet to see if helpful or not needed    2-2-2 is his present sinemet regimen and would go to 2-1-2 to 1-1-2 to 1-1-1- and 1-0-1 and 0-0-1 and 0-0-0    Next may consider talking with sleep doctor about long acting requip XL instead of short acting and see how that goes and if possible to find a single dose of requip XL and see if can get off the rotigotine    Alternatively since he is doing relatively well may want to stay on same regimen.     Last visit was a phone call 3/2023    Return visit in 6-12 months.

## 2023-10-05 NOTE — NURSING NOTE
Chief Complaint   Patient presents with    RECHECK       Vitals were taken and medications were reconciled.      Britt Morales, Technician  12:21 PM  October 5, 2023

## 2023-10-14 NOTE — PROGRESS NOTES
[facility] Surgery Follow up    HPI:    67 year old year old male who returns for a follow up.  For 6-month follow-up.  He had closure done over the left leg 6 months ago.  He is continue her compression socks and exercise work on his weight.  He still has some balance issues and has some issues with neuropathy problems which have not changed which sound surprising.  He has had relief a lot of the symptoms that he had in his left leg.    Allergies:Seasonal allergies    Past Medical History:   Diagnosis Date    ACP (advance care planning) 11/29/2012    Formatting of this note might be different from the original. Brochure given    Alcoholism (H) 07/25/2011    Overview:  No use since 2011.  Formatting of this note might be different from the original. No use since 2011.    Altered behavior 09/05/2019    Altered mental status 09/05/2019    Ataxia 01/15/2014    Overview:  Falls  Formatting of this note might be different from the original. 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 Complex medical disorder with encephalopathy, ROSY, intermittent paralyses. Evaluation unremarkalble except for elevation of paraneoplastic antibodies, followed by neuroimmunology. MRI at [place] reportedly normal, EEG here with generalized epileptiform abnormalities. LEV started with cessation of spells. Breakthru Aug 2017, levetiracetam increase    Dehydration 01/15/2014    Formatting of this note is different from the original. CREATININE                (mg/dL)  Date Value  1/15/2014 1.88*   (previous Creatinine 1.2)    Dementia (H) 01/15/2014    Overview:  March 2013: abnormal neuropsych testing at [facility]. Jan 2015 - Dx as Pick's disease (frontoparietal dementia)  Formatting of this note  might be different from the original. March 2013: abnormal neuropsych testing at [facility]. 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  HEMOGLOBIN A1C MONITORING (POCT) (% Total Hgb)  Date Value  9/5/2013 6.0   3/20/2013 6.7*  9/26/2012 5.7    Overview:  Formatting of this note might be different from the original.  HEMOGLOBIN A1C MONITORING (POCT) (% Total Hgb)  Date Value  9/5/2013 6.0   3/20/2013 6.7*  9/26/2012 5.7    Formatting of this note is different f    Diabetes mellitus without complication (H) 04/05/2006    Displacement of cervical intervertebral disc without myelopathy 10/05/2018    Overview:  2002 fall with disk herniation at C 6-7, cervical fusion. 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  Formatting of this note might be different from the original. 2002 fall with disk herniation at C 6-7, cervical fusion. 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    Edema 07/25/2011    Edema due to malnutrition, due to unspecified malnutrition type (H24) 08/02/2021    Encephalopathy 12/10/2015    Gout, unspecified 06/08/2011    HTN (hypertension) 05/10/2012    Overview:  Lisinopril  Formatting of this note might be different from the original. Lisinopril    Hyperlipidemia 01/09/2018    Knee pain, bilateral 07/16/2011    Memory loss 08/18/2015    Muscle spasm of both lower legs 10/30/2014    Neuropathy 07/16/2011    Overview:  Gabapentin  Formatting of this note might be different from the original. Gabapentin    Nonsustained ventricular tachycardia (H) 08/08/2022    Obesity 01/15/2014    Overview:  1/15/2014 Body mass index is 36.5 kg/(m^2).  Formatting of this note might be different from the original. 1/15/2014 Body mass index is 36.5 kg/(m^2).    ROSY (obstructive sleep apnea) 12/05/2012    Overview:  [facility]  12.  AHI 6.2  RDI 6.2  AHI REM 53.3  Oxygen Narayan 66%  Formatting of this note might be different from the original. [facility] 12.  AHI 6.2  RDI 6.2  AHI REM 53.3  Oxygen Narayan 66%    Pacemaker     Aug 2023    Pain medication agreement 2011    Overview:  Pain Clinic: MS Contin and Vicodin. 120 vicodin a month.  Formatting of this note might be different from the original. Pain Clinic: MS Contin and Vicodin. 120 vicodin a month.    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 mild asymmetry noted 2019    Restless legs syndrome (RLS) 2008    Right inguinal hernia 2021    Routine general medical examination at a health care facility 10/05/2018    Overview:  Colonoscopy Bone Denisty Last Lipids: Chol: 162    10/24/2006 T    10/24/2006 HDL:   32    10/24/2006 LDL:  90    10/24/2006 GLUCOSE                   (mg/dL)  Date             Value  2007          99  ---------- No results found for this basename:  PSASCREEN,PSADIAGNOSTI  Formatting of this note might be different from the original. Colonoscopy Bone Denisty Last Lipids: C    Seizures (H)     Sepsis (H) 2021    Substance abuse (H)     Umbilical hernia without obstruction and without gangrene 2021       Past Surgical History:   Procedure Laterality Date    Cervical Cord Decompression      GI SURGERY      gastric bypass surgery    HIP SURGERY Left     left hip surgery    KNEE SURGERY Left 2008    left knee arthroplasty       CURRENT MEDS:  Current Outpatient Medications   Medication    allopurinol (ZYLOPRIM) 100 MG tablet    aspirin 81 MG tablet    B-D U/F insulin pen needle    baclofen (LIORESAL) 10 MG tablet    carbidopa-levodopa  (SINEMET)  MG tablet    cholecalciferol (HM VITAMIN D3) 50 MCG ( [place]) CAPS    COMPRESSION STOCKINGS    cyanocobalamin (CYANOCOBALAMIN) 1000 MCG/ML injection    diclofenac (VOLTAREN) 1 % topical gel    diphenhydrAMINE-zinc acetate (BENADRYL) 2-0.1 % external cream    fluticasone (FLONASE) 50 MCG/ACT nasal spray    furosemide (LASIX) 40 MG tablet    gabapentin (NEURONTIN) 800 MG tablet    HYDROcodone-acetaminophen (NORCO) 5-325 MG per tablet    lidocaine (LIDODERM) 5 % patch    magnesium 250 MG tablet    metFORMIN (GLUCOPHAGE-XR) 500 MG 24 hr tablet    methadone (DOLOPHINE) 5 MG tablet    MIRALAX 17 g packet    polyethylene glycol (MIRALAX) 17 GM/Dose powder    potassium chloride ER (KLOR-CON M) 20 MEQ CR tablet    REFRESH OPTIVE ADVANCED 0.5-1-0.5 % SOLN    rOPINIRole (REQUIP) 1 MG tablet    rotigotine (NEUPRO) 3 MG/24HR 24 hr patch    simvastatin (ZOCOR) 40 MG tablet    tamsulosin (FLOMAX) 0.4 MG capsule    valACYclovir (VALTREX) 1000 mg tablet    venlafaxine (EFFEXOR XR) 75 MG 24 hr capsule    amoxicillin (AMOXIL) 500 MG capsule    desonide (DESOWEN) 0.05 % external cream    ferrous fumarate 65 mg ([place]. FE)-Vitamin C 125 mg (VITRON C)  MG TABS tablet    PAM-KAMRON 8.6 MG tablet    ipratropium - albuterol 0.5 mg/2.5 mg/3 mL (DUONEB) 0.5-2.5 (3) MG/3ML neb solution    Ketoprofen 10 % CREA    levETIRAcetam (KEPPRA) 500 MG tablet    LORazepam (ATIVAN) 1 MG tablet    Naftifine (NAFTIN) 2 % GEL    NONFORMULARY    PARoxetine (PAXIL) 10 MG tablet    triamcinolone (KENALOG) 0.1 % external cream    UNABLE TO FIND     No current facility-administered medications for this visit.       Family History   Problem Relation Age of Onset    Dementia Mother         started in her 70s,  age 83 or 84    Other - See Comments Mother         moderate. lives with spouse    Other - See Comments Father         hearing decline, prostate cancer, memory loss, possible dementia    Prostate Cancer Father     Memory loss Father   "   Cerebrovascular Disease Father     Hearing Loss Father     Dementia Father     Multiple births Son     Multiple births Son     Brain Cancer Other         grand parent    Dementia Other         reports that he has never smoked. He has never used smokeless tobacco. He reports current alcohol use. He reports that he does not use drugs.    Review of Systems:  Negative except leg issues. Also noted neuropathy and balance issues.Otherwise twelve system of review is negative.      OBJECTIVE:  Vitals:    09/27/23 1151   BP: 118/78   Pulse: 76   Resp: 16   Temp: 97.8  F (36.6  C)   SpO2: 92%     There is no height or weight on file to calculate BMI.    Status: doing well    EXAM:  GENERAL: This is a well-developed 67 year old male who appears his stated age  HEAD: normocephalic  HEENT: Pupils equal and reactive bilaterally  CARDIAC: RRR without murmur  CHEST/LUNG:  Clear to auscultation  ABDOMEN: Soft, nontender, nondistended, no masses    NEUROLOGIC: Focally intact, nonfocal  VASCULAR: Pulses intact, symmetrical upper and lower extremities.              Side:: Bilateral  VCSS  PAIN:: Mild: Occasional, not restricting activity of requiring pain medication  Varicose Veins:: Absent: None  Venous Edema:: Mild: Evening ankle swelling only  Skin Pigmentation:: Mild: Diffuse, but limited in area and old (brown)  Inflamation:: Absent: None  Induration:: Absent: None  Number of active ulcers:: 0  Active ulcer duration:: None  Active ulcer diameter:: None  Compression Therapy:: Wears elastric stockings most days  VCSS Score:: 5  CEAP:: A healed venous ulcer    LABS:  Lab Results   Component Value Date    WBC 8.2 09/07/2019    HGB 13.0 09/07/2019    HCT 41.1 09/07/2019     09/07/2019     09/08/2019     INR/Prothrombin Time  @LABRCNTIP(NA,K,CL,co2,bun,creatinine,labglom,glucose,calcium)@  No results found for: \"HGBA1C\"  Lab Results   Component Value Date    ALT 37 09/05/2019    AST 22 09/13/2019    ALKPHOS 75 " 09/05/2019        Images:    Reviewed old closure post study    Exam Information    Exam Date Exam Time Accession # Performing Department Results    4/6/23  3:01 PM [id number] [facility] [place]      PACS Images     Show images for US Venous Post Ablation Leg Left     Study Result    Narrative & Impression   Left Venous Ultrasound Status Post Radiofrequency Ablation (Date: 04/06/23)        Indication: Follow-up saphenous vein Radiofrequency ablation     Date of Procedure: Left 04/04/23      Left CFV/SFJ Compression:  Fully Compressible     Reference:   (FC)-Fully Compressible           (PC)-Partially Compressible   (NC) Non-Compressible     Location Left GSV   Proximal Thigh NC   Mid Thigh NC   Distal Thigh NC   Knee NC   Proximal Calf NC   Mid Calf NC   Distal Calf NC      Impression: Successful ablation of left leg greater saphenous vein from the proximal thigh to the distal calf.  No evidence of DVT at the left common femoral vein level.          Assessment/Plan:      Leg swelling  Symptomatic varicose veins, left     Still has left leg with swelling still has some swelling of his other leg to the also has neuropathy problems of both lower legs.  I would not recommend interventions at this time point continue compression and see me back on a as needed basis.    No follow-ups on file.     Benjamin Salinas MD ,MD  [facility] Department of Surgery

## 2023-10-30 ENCOUNTER — VIRTUAL VISIT (OUTPATIENT)
Dept: PHARMACY | Facility: CLINIC | Age: 68
End: 2023-10-30
Attending: PSYCHIATRY & NEUROLOGY
Payer: COMMERCIAL

## 2023-10-30 DIAGNOSIS — G40.309: ICD-10-CM

## 2023-10-30 DIAGNOSIS — G25.81 RLS (RESTLESS LEGS SYNDROME): Primary | ICD-10-CM

## 2023-10-30 PROCEDURE — 99207 PR NO CHARGE LOS: CPT | Performed by: PHARMACIST

## 2023-10-30 NOTE — PROGRESS NOTES
Disease State Management Encounter:                          Nils Loco is a 67 year old male called for an initial visit. He was referred to me from Dr. Ochoa. Patient was accompanied by wife, Regina.    Reason for visit: discuss weaning off carbidopa-levodopa     RLS:  Carbidopa-levodopa  mg, taking 2 tablets 3 times daily   Ropinirole 1 mg 4 times daily   Neupro 3 mg patch daily   Gabapentin 800 mg 3 times daily     Patient has had a normal DaTscan and has been recommended to wean off carbidopa-levodopa as it's unlikely he has Parkinson's. His RLS is well controlled on ropinirole + Neupro and wife states that although this is a higher than recommended dosage, it controls his RLS very well. He notices immediately if missing a ropinirole dose. He is following with Dr. Maddox in sleep medicine and has an upcoming appt. They are wondering about trying long-acting ropinirole.     Wife is having hip replacement surgery on 11/22 and would prefer to wait to start the carbidopa-levodopa until after surgery given Nils's dementia. She is planning to have the other hip and both knees done in the future so there may be several upcoming procedures for the wife.     Today's Vitals: There were no vitals taken for this visit.    Assessment/Plan:    You could talk with Dr. Maddox (sleep doctor) about possibly trying the long-acting version of ropinirole which would be 4 mg tablet once daily  Here is the schedule you can follow for weaning off carbidopa-levodopa after Regina's hip replacement:   Week 1- 2-1-2  Week 2- 1-1-2   Week 3- 1-1-1  Week 4- 1-0-1  Week 5- 0-0-1  Week 6- off      Follow-up: as needed    I spent 26 minutes with this patient today. All changes were made via collaborative practice agreement with Dr. Ochoa. A copy of the visit note was provided to the patient's provider(s).    A summary of these recommendations was sent via [employer].    Rhonda Arzate, Pharm.D.  Medication Therapy Management  Pharmacist  [facility]     Medication Therapy Recommendations  RLS (restless legs syndrome)    Current Medication: carbidopa-levodopa (SINEMET)  MG tablet   Rationale: Duplicate Therapy - Unnecessary medication therapy - Indication   Recommendation: Discontinue Medication   Status: Accepted per CPA

## 2023-10-30 NOTE — PATIENT INSTRUCTIONS
Recommendations from today's disease management visit:                                                      You could talk with Dr. Maddox (sleep doctor) about possibly trying the long-acting version of ropinirole which would be 4 mg tablet once daily  Here is the schedule you can follow for weaning off carbidopa-levodopa after Regina's hip replacement:   Week 1- 2-1-2  Week 2- 1-1-2   Week 3- 1-1-1  Week 4- 1-0-1  Week 5- 0-0-1  Week 6- off      Follow-up: as needed    To schedule another [facility] appointment, please call the clinic directly or you may call the [facility] scheduling line at [phone] or toll-free at [phone].     My Clinical Pharmacist's contact information:                                                      Please feel free to contact me with any questions or concerns you have.      Rhonda Arzate, Pharm.D.  Medication Therapy Management Pharmacist  [facility]

## 2023-11-02 RX ORDER — LEVETIRACETAM 500 MG/1
TABLET ORAL
Qty: 217 TABLET | Refills: 0 | Status: SHIPPED | OUTPATIENT
Start: 2023-11-02 | End: 2023-12-01

## 2023-11-02 NOTE — TELEPHONE ENCOUNTER
LEVETIRACETAM 500MG TABS   Last Written Prescription Date:   9/1/2023  Last Fill Quantity: 217,   # refills: 1  Last Office Visit : 10/5/2023  Future Office visit:  12/1/2023  217 Tabs filled to cover Pt until visit in December 2023.     Sakina Johnson RN  Central Triage Red Flags/Med Refills

## 2023-12-01 ENCOUNTER — OFFICE VISIT (OUTPATIENT)
Dept: NEUROLOGY | Facility: CLINIC | Age: 68
End: 2023-12-01
Payer: COMMERCIAL

## 2023-12-01 VITALS
HEART RATE: 73 BPM | SYSTOLIC BLOOD PRESSURE: 152 MMHG | OXYGEN SATURATION: 96 % | DIASTOLIC BLOOD PRESSURE: 88 MMHG | RESPIRATION RATE: 16 BRPM

## 2023-12-01 DIAGNOSIS — G40.309: ICD-10-CM

## 2023-12-01 DIAGNOSIS — E11.40 CONTROLLED TYPE 2 DIABETES MELLITUS WITH DIABETIC NEUROPATHY, WITHOUT LONG-TERM CURRENT USE OF INSULIN (H): ICD-10-CM

## 2023-12-01 DIAGNOSIS — E43 EDEMA DUE TO MALNUTRITION, DUE TO UNSPECIFIED MALNUTRITION TYPE (H): ICD-10-CM

## 2023-12-01 DIAGNOSIS — I47.29 NONSUSTAINED VENTRICULAR TACHYCARDIA (H): ICD-10-CM

## 2023-12-01 DIAGNOSIS — E66.812 CLASS 2 SEVERE OBESITY DUE TO EXCESS CALORIES WITH SERIOUS COMORBIDITY IN ADULT, UNSPECIFIED BMI (H): ICD-10-CM

## 2023-12-01 DIAGNOSIS — E66.01 CLASS 2 SEVERE OBESITY DUE TO EXCESS CALORIES WITH SERIOUS COMORBIDITY IN ADULT, UNSPECIFIED BMI (H): ICD-10-CM

## 2023-12-01 PROCEDURE — 99215 OFFICE O/P EST HI 40 MIN: CPT | Performed by: PSYCHIATRY & NEUROLOGY

## 2023-12-01 RX ORDER — LEVETIRACETAM 500 MG/1
TABLET ORAL
Qty: 217 TABLET | Refills: 11 | Status: SHIPPED | OUTPATIENT
Start: 2023-12-01

## 2023-12-01 NOTE — NURSING NOTE
Chief Complaint   Patient presents with    RECHECK     Here for follow up, confirmed with patient     Sanchez Veronica

## 2023-12-01 NOTE — PROGRESS NOTES
"[facility] Epilepsy Care Progress Note      Patient:  Jose Loco  :  1955   Age:  67 year old   Today's Office Visit:  2023    Epilepsy Data:    Patient History  Primary Epileptologist/Provider: Sandeep Owens M.D.  Patient Status: Not yet controlled  Epilepsy Syndrome: Generalized Epilepsy unspecified     Tests/Surgery History  Last EEG: oct 2015  Last MRI:     Seizure Record  Current Visit Date: 23  Previous Visit Date: 23  Months since last visit: 10.35  Seizure Type 1: Unspecified Staring Spell  Description of Sz Type 1: stares, still, unresponsive  # of Type 1 Seizure since last visit: 0  Freq. Type 1 / Month: 0  Seizure Type 2: Unspecified Convulsion  Description of Sz Type 2: collapse with stiffening and amnesia  # of Type 2 Seizure since last visit: 0  Freq. Type 2 / Month: 0    Background History:  Staring spells and major motor attacks started age 57. Complex medical disorder with encephalopathy, ROSY, intermittent paralyses. Evaluation unremarkalble except for elevation of paraneoplastic antibodies, followed by neuroimmunology. MRI at [facility] reportedly normal, EEG here with generalized epileptiform abnormalities. LEV started with cessation of spells. Breakthru Aug 2017, levetiracetam increased to 2500 mg/d. Breaktrough Sep 2019; level not done, levetiracetam increased to 3500 mg/d.    History of Present Illness:     No seizures since last visit. No fainting attacks.  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  Review of Care Everywhere indicates diagnosis of sick sinus syndrome and \"chronotropic incompetence\".    Current Outpatient Medications   Medication Sig Dispense Refill    allopurinol (ZYLOPRIM) 100 MG tablet 3 x 100mg tab by mouth daily at noon      amoxicillin (AMOXIL) 500 MG capsule TAKE 4 CAPSULES BY MOUTH 1 HOUR PRIOR TO DENTAL APPOINTMENT      aspirin 81 MG tablet Take 81 mg by mouth daily At noon      B-D U/F insulin pen " needle USE AS DIRECTED WITH B12      baclofen (LIORESAL) 10 MG tablet 10mg tab by mouth twice daily @ 6am and 10pm  2    carbidopa-levodopa (SINEMET)  MG tablet TAKE TWO TABLETS BY MOUTH THREE TIMES A DAY (6AM, NOON, 11PM) 180 tablet 11    cholecalciferol (HM VITAMIN D3) 50 MCG (2000 UT) CAPS 2000 unit 50mcg capsule by mouth daily @ noon 100 capsule 2    COMPRESSION STOCKINGS 1 each      cyanocobalamin (CYANOCOBALAMIN) 1000 MCG/ML injection Inject 1,000 mcg into the muscle every 28 days      desonide (DESOWEN) 0.05 % external cream       diclofenac (VOLTAREN) 1 % topical gel APPLY 2 GRAMS TO AFFECTED AREA(S) FOUR TIMES DAILY      diphenhydrAMINE-zinc acetate (BENADRYL) 2-0.1 % external cream       ferrous fumarate 65 mg ([place]. FE)-Vitamin C 125 mg (VITRON C)  MG TABS tablet 1 tab by mouth twice daily @ noon and 6pm      fluticasone (FLONASE) 50 MCG/ACT nasal spray As needed      furosemide (LASIX) 40 MG tablet 2 x 40mg tab by mouth at 6am and 40mg tab by mouth at noon = 3/day 270 tablet 3    gabapentin (NEURONTIN) 800 MG tablet 800mg tab by mouth 3/day @ 6am, 12noon, 11pm      HYDROcodone-acetaminophen (NORCO) 5-325 MG per tablet as needed      ipratropium - albuterol 0.5 mg/2.5 mg/3 mL (DUONEB) 0.5-2.5 (3) MG/3ML neb solution Take 1 vial by nebulization every 4 hours as needed      Ketoprofen 10 % CREA Externally apply 1 Tube topically 2 times daily 1 Tube 1    levETIRAcetam (KEPPRA) 500 MG tablet TAKE THREE TABLETS BY MOUTH EVERY MORNING AND FOUR TABLETS IN THE EVENING 217 tablet 0    lidocaine (LIDODERM) 5 % patch Place 1 patch onto the skin daily as needed for moderate pain Reported on 3/21/2017      LORazepam (ATIVAN) 1 MG tablet 1mg tab by mouth nightly as needed      magnesium 250 MG tablet Take 1 tablet (250 mg) by mouth 2 times daily      metFORMIN (GLUCOPHAGE-XR) 500 MG 24 hr tablet START WITH TAKE ONE TABLET BY MOUTH EVERY DAY WITH SUPPER FOR TWO WEEKS THEN TAKE TWO TABLETS BY MOUTH EVERY DAY  WITH SUPPER      methadone (DOLOPHINE) 5 MG tablet 1-2 x 5mg tab by mouth nightly 1 tablet 0    MIRALAX 17 g packet MIX AND DRINK 17 GRAMS BY MOUTH OR NG-TUBE ONCE DAILY AS NEEDED      Naftifine (NAFTIN) 2 % GEL Apply topically daily Reported on 3/21/2017      NONFORMULARY Diabetic Shoe      PARoxetine (PAXIL) 10 MG tablet 10mg daily for 1 week then 20mg daily for 1 week then 30mg daily for 1 week then 40mg daily 120 tablet 11    polyethylene glycol (MIRALAX) 17 GM/Dose powder Take 17 g by mouth daily as needed for constipation      potassium chloride ER (KLOR-CON M) 20 MEQ CR tablet 20meq tab by mouth twice daily      REFRESH OPTIVE ADVANCED 0.5-1-0.5 % SOLN PLACE ONE DROP INTO THE EYE(S) 5 TIMES DAILY AS NEEDED FOR DRY EYES      rOPINIRole (REQUIP) 1 MG tablet 1mg tab by mouth 4/day @ 6am, 12noon, 6pm, and 10-11pm 360 tablet 3    rotigotine (NEUPRO) 3 MG/24HR 24 hr patch Place 1 patch onto the skin daily 90 patch 3    simvastatin (ZOCOR) 40 MG tablet 40mg tab by mouth at bedtime      tamsulosin (FLOMAX) 0.4 MG capsule Take 1 capsule (0.4 mg) by mouth daily for 360 days 90 capsule 3    triamcinolone (KENALOG) 0.1 % external cream As needed      UNABLE TO FIND Diabetic shoes DX: Diabetic Neuropathy      valACYclovir (VALTREX) 1000 mg tablet Take 1 tablet (1,000 mg) by mouth daily as needed      venlafaxine (EFFEXOR XR) 75 MG 24 hr capsule Take 75 mg by mouth daily      PAM-KAMRON 8.6 MG tablet TAKE ONE TO TWO TABLETS BY MOUTH TWICE A DAY AS NEEDED (Patient not taking: Reported on 9/27/2023)          Medication Notes:    Taking levetiracetam as above.    AED Medication Compliance:  compliant all of the time  Using a pill box:  Yes    Review of Systems:  No vomiting, diarrhea, fevers, hematuria or kidney stones.  Have you experienced a traumatic fall since your last visit: NO  Are these falls related to your seizures: Not Applicable    Other Issues:    Wife has undergone hip replacement and is in a TCU. At home alone  "now. Getting along well by himself.  He denies symptoms of restless legs today. Reports aches in his feet but states they improve when he lays still. Denies tingling in feet.  Is patient safe to drive:  No    Exam:    BP (!) 152/88 (BP Location: Right arm, Patient Position: Sitting, Cuff Size: Adult Large)   Pulse 73   Resp 16   SpO2 96%      Wt Readings from Last 5 Encounters:   05/09/23 99.8 kg (220 lb)   12/21/22 99.8 kg (220 lb)   11/15/19 99.3 kg (219 lb)   10/02/19 101.4 kg (223 lb 8 oz)   09/13/19 97.1 kg (214 lb)     States date correctly. Oriented to day, month, year. Clinic name and floor fine. Oriented to city and county. Repeats three items correctly. Short term memory 3/3 verbal objects after distractor. \"World\" spelled backward: lrow. Names objects well. Repeats complex sentences well. Follows three step command correctly. When asked to draw intersecting pentagons he draws a rectangle intersecting with a pentagon. Cannot draw cube. Writes a simple sentence. Follows written command. MMSE = 28, similar to last MMSE = 26.     He is polite and cooperative throughout. Trouble getting out of chair. Safe gait, somewhat wide based. Romberg negative. VFF. EOMI. No nystagmus. Normal smooth pursuit. Smile symmetrical. Tongue midline. No drift, pronation, or tremor. Strength full proximally and distally. FFN is done well. DTR are difficult to obtain. Vibratory sensation is seems better today, about 8 seconds at ankles bilaterally. Tone does not appear significantly increased to may exam. No resting tremor. No grasp reflexes.    Heart exam without murmur. RRR.    CT scan of head May 2023 [facility] with bilateral small vessel disease.     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 This is based on interictal EEG and on apparent response to levetiracetam. Suspect he has had long term increased seizure tendency. I do not think seizures are related to cerebral " degenerative syndrome or potential cerebral auto-immune disorder.   2) Possible seizure in Sep 2022 but this is not at all certain. His encephalopathy could have been entirely related to cellulitis, fever, and elevated levetiracetam level. Last definite seizure Sep 2019. Alcohol could have played some role in that breakthrough seizure, reports no alcohol use currently.   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 Again, alcohol did not help. Serial MMSEs since Nov 2019 unremarkable.  4) Restless leg syndrome complaints less significant today. Followed by movement disorders for potential PD; does not appear Parkinsonian today.  5) No complaints about behavioral outbursts today. Perhaps related to treatment with SSRIs. This confirms that levetiracetam was not playing a role.     PLAN:  1) Continue levetiracetam 1500 mg in AM and 2000 mg in PM. Refilled Rx today.  2) VPA or ESM may be appropriate medications to consider next for seizures. Lamotrigine could possibly help with mood but may be more challenging to initiate in this situation.     Total time in person today 30 min. Additional 7 min reviewing chart prior to visit. Additional 7 min generating note and coordinating care following visit. So total of 44 min, all on day of visit. Discussed with son who provided unique information. Reviewed multiple notes and results as above.     Sandeep Owens MD

## 2023-12-01 NOTE — LETTER
"2023       RE: Jose Loco  [address] [id number]     Dear Colleague,    Thank you for referring your patient, Jose Loco, to the [facility] [place] at [facility]. Please see a copy of my visit note below.    [facility] Epilepsy Care Progress Note      Patient:  Jose Loco  :  1955   Age:  67 year old   Today's Office Visit:  2023    Epilepsy Data:    Patient History  Primary Epileptologist/Provider: Sandeep Owens M.D.  Patient Status: Not yet controlled  Epilepsy Syndrome: Generalized Epilepsy unspecified     Tests/Surgery History  Last EEG: oct 2015  Last MRI:     Seizure Record  Current Visit Date: 23  Previous Visit Date: 23  Months since last visit: 10.35  Seizure Type 1: Unspecified Staring Spell  Description of Sz Type 1: stares, still, unresponsive  # of Type 1 Seizure since last visit: 0  Freq. Type 1 / Month: 0  Seizure Type 2: Unspecified Convulsion  Description of Sz Type 2: collapse with stiffening and amnesia  # of Type 2 Seizure since last visit: 0  Freq. Type 2 / Month: 0    Background History:  Staring spells and major motor attacks started age 57. Complex medical disorder with encephalopathy, ROSY, intermittent paralyses. Evaluation unremarkalble except for elevation of paraneoplastic antibodies, followed by neuroimmunology. MRI at [place] reportedly normal, EEG here with generalized epileptiform abnormalities. LEV started with cessation of spells. Breakthru Aug 2017, levetiracetam increased to 2500 mg/d. Breaktrough Sep 2019; level not done, levetiracetam increased to 3500 mg/d.    History of Present Illness:     No seizures since last visit. No fainting attacks.  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  Review of Care Everywhere indicates diagnosis of sick sinus syndrome and \"chronotropic " "incompetence\".    Current Outpatient Medications   Medication Sig Dispense Refill    allopurinol (ZYLOPRIM) 100 MG tablet 3 x 100mg tab by mouth daily at noon      amoxicillin (AMOXIL) 500 MG capsule TAKE 4 CAPSULES BY MOUTH 1 HOUR PRIOR TO DENTAL APPOINTMENT      aspirin 81 MG tablet Take 81 mg by mouth daily At noon      B-D U/F insulin pen needle USE AS DIRECTED WITH B12      baclofen (LIORESAL) 10 MG tablet 10mg tab by mouth twice daily @ 6am and 10pm  2    carbidopa-levodopa (SINEMET)  MG tablet TAKE TWO TABLETS BY MOUTH THREE TIMES A DAY (6AM, NOON, 11PM) 180 tablet 11    cholecalciferol (HM VITAMIN D3) 50 MCG (2000 UT) CAPS 2000 unit 50mcg capsule by mouth daily @ noon 100 capsule 2    COMPRESSION STOCKINGS 1 each      cyanocobalamin (CYANOCOBALAMIN) 1000 MCG/ML injection Inject 1,000 mcg into the muscle every 28 days      desonide (DESOWEN) 0.05 % external cream       diclofenac (VOLTAREN) 1 % topical gel APPLY 2 GRAMS TO AFFECTED AREA(S) FOUR TIMES DAILY      diphenhydrAMINE-zinc acetate (BENADRYL) 2-0.1 % external cream       ferrous fumarate 65 mg ([place]. FE)-Vitamin C 125 mg (VITRON C)  MG TABS tablet 1 tab by mouth twice daily @ noon and 6pm      fluticasone (FLONASE) 50 MCG/ACT nasal spray As needed      furosemide (LASIX) 40 MG tablet 2 x 40mg tab by mouth at 6am and 40mg tab by mouth at noon = 3/day 270 tablet 3    gabapentin (NEURONTIN) 800 MG tablet 800mg tab by mouth 3/day @ 6am, 12noon, 11pm      HYDROcodone-acetaminophen (NORCO) 5-325 MG per tablet as needed      ipratropium - albuterol 0.5 mg/2.5 mg/3 mL (DUONEB) 0.5-2.5 (3) MG/3ML neb solution Take 1 vial by nebulization every 4 hours as needed      Ketoprofen 10 % CREA Externally apply 1 Tube topically 2 times daily 1 Tube 1    levETIRAcetam (KEPPRA) 500 MG tablet TAKE THREE TABLETS BY MOUTH EVERY MORNING AND FOUR TABLETS IN THE EVENING 217 tablet 0    lidocaine (LIDODERM) 5 % patch Place 1 patch onto the skin daily as needed for " moderate pain Reported on 3/21/2017      LORazepam (ATIVAN) 1 MG tablet 1mg tab by mouth nightly as needed      magnesium 250 MG tablet Take 1 tablet (250 mg) by mouth 2 times daily      metFORMIN (GLUCOPHAGE-XR) 500 MG 24 hr tablet START WITH TAKE ONE TABLET BY MOUTH EVERY DAY WITH SUPPER FOR TWO WEEKS THEN TAKE TWO TABLETS BY MOUTH EVERY DAY WITH SUPPER      methadone (DOLOPHINE) 5 MG tablet 1-2 x 5mg tab by mouth nightly 1 tablet 0    MIRALAX 17 g packet MIX AND DRINK 17 GRAMS BY MOUTH OR NG-TUBE ONCE DAILY AS NEEDED      Naftifine (NAFTIN) 2 % GEL Apply topically daily Reported on 3/21/2017      NONFORMULARY Diabetic Shoe      PARoxetine (PAXIL) 10 MG tablet 10mg daily for 1 week then 20mg daily for 1 week then 30mg daily for 1 week then 40mg daily 120 tablet 11    polyethylene glycol (MIRALAX) 17 GM/Dose powder Take 17 g by mouth daily as needed for constipation      potassium chloride ER (KLOR-CON M) 20 MEQ CR tablet 20meq tab by mouth twice daily      REFRESH OPTIVE ADVANCED 0.5-1-0.5 % SOLN PLACE ONE DROP INTO THE EYE(S) 5 TIMES DAILY AS NEEDED FOR DRY EYES      rOPINIRole (REQUIP) 1 MG tablet 1mg tab by mouth 4/day @ 6am, 12noon, 6pm, and 10-11pm 360 tablet 3    rotigotine (NEUPRO) 3 MG/24HR 24 hr patch Place 1 patch onto the skin daily 90 patch 3    simvastatin (ZOCOR) 40 MG tablet 40mg tab by mouth at bedtime      tamsulosin (FLOMAX) 0.4 MG capsule Take 1 capsule (0.4 mg) by mouth daily for 360 days 90 capsule 3    triamcinolone (KENALOG) 0.1 % external cream As needed      UNABLE TO FIND Diabetic shoes DX: Diabetic Neuropathy      valACYclovir (VALTREX) 1000 mg tablet Take 1 tablet (1,000 mg) by mouth daily as needed      venlafaxine (EFFEXOR XR) 75 MG 24 hr capsule Take 75 mg by mouth daily      PAM-KAMRON 8.6 MG tablet TAKE ONE TO TWO TABLETS BY MOUTH TWICE A DAY AS NEEDED (Patient not taking: Reported on 9/27/2023)          Medication Notes:    Taking levetiracetam as above.    AED Medication  "Compliance:  compliant all of the time  Using a pill box:  Yes    Review of Systems:  No vomiting, diarrhea, fevers, hematuria or kidney stones.  Have you experienced a traumatic fall since your last visit: NO  Are these falls related to your seizures: Not Applicable    Other Issues:    Wife has undergone hip replacement and is in a TCU. At home alone now. Getting along well by himself.  He denies symptoms of restless legs today. Reports aches in his feet but states they improve when he lays still. Denies tingling in feet.  Is patient safe to drive:  No    Exam:    BP (!) 152/88 (BP Location: Right arm, Patient Position: Sitting, Cuff Size: Adult Large)   Pulse 73   Resp 16   SpO2 96%      Wt Readings from Last 5 Encounters:   05/09/23 99.8 kg (220 lb)   12/21/22 99.8 kg (220 lb)   11/15/19 99.3 kg (219 lb)   10/02/19 101.4 kg (223 lb 8 oz)   09/13/19 97.1 kg (214 lb)     States date correctly. Oriented to day, month, year. Clinic name and floor fine. Oriented to city and county. Repeats three items correctly. Short term memory 3/3 verbal objects after distractor. \"World\" spelled backward: lrow. Names objects well. Repeats complex sentences well. Follows three step command correctly. When asked to draw intersecting pentagons he draws a rectangle intersecting with a pentagon. Cannot draw cube. Writes a simple sentence. Follows written command. MMSE = 28, similar to last MMSE = 26.     He is polite and cooperative throughout. Trouble getting out of chair. Safe gait, somewhat wide based. Romberg negative. VFF. EOMI. No nystagmus. Normal smooth pursuit. Smile symmetrical. Tongue midline. No drift, pronation, or tremor. Strength full proximally and distally. FFN is done well. DTR are difficult to obtain. Vibratory sensation is seems better today, about 8 seconds at ankles bilaterally. Tone does not appear significantly increased to may exam. No resting tremor. No grasp reflexes.    Heart exam without murmur. " RRR.    CT scan of head May 2023 [facility] with bilateral small vessel disease.     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 This is based on interictal EEG and on apparent response to levetiracetam. Suspect he has had long term increased seizure tendency. I do not think seizures are related to cerebral degenerative syndrome or potential cerebral auto-immune disorder.   2) Possible seizure in Sep 2022 but this is not at all certain. His encephalopathy could have been entirely related to cellulitis, fever, and elevated levetiracetam level. Last definite seizure Sep 2019. Alcohol could have played some role in that breakthrough seizure, reports no alcohol use currently.   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 Again, alcohol did not help. Serial MMSEs since Nov 2019 unremarkable.  4) Restless leg syndrome complaints less significant today. Followed by movement disorders for potential PD; does not appear Parkinsonian today.  5) No complaints about behavioral outbursts today. Perhaps related to treatment with SSRIs. This confirms that levetiracetam was not playing a role.     PLAN:  1) Continue levetiracetam 1500 mg in AM and 2000 mg in PM. Refilled Rx today.  2) VPA or ESM may be appropriate medications to consider next for seizures. Lamotrigine could possibly help with mood but may be more challenging to initiate in this situation.     Total time in person today 30 min. Additional 7 min reviewing chart prior to visit. Additional 7 min generating note and coordinating care following visit. So total of 44 min, all on day of visit. Discussed with son who provided unique information. Reviewed multiple notes and results as above.         Again, thank you for allowing me to participate in the care of your patient.      Sincerely,    Sandeep Owens MD

## 2023-12-01 NOTE — PATIENT INSTRUCTIONS
You are doing well with respect to your seizures.  Keep taking the levetiracetam three tablets in the morning and four in the evening.  Your prescriptioin for the levetiracetam should last for another year.    Keep taking your B12.

## 2024-05-05 DIAGNOSIS — G20.C PARKINSONISM, UNSPECIFIED PARKINSONISM TYPE (H): ICD-10-CM

## 2024-05-06 RX ORDER — CARBIDOPA AND LEVODOPA 25; 100 MG/1; MG/1
TABLET ORAL
Qty: 180 TABLET | Refills: 11 | Status: SHIPPED | OUTPATIENT
Start: 2024-05-06 | End: 2024-06-14

## 2024-05-06 NOTE — TELEPHONE ENCOUNTER
RX Authorization    Medication: Carbidopa-levodopa (SINEMET)  MG tablet     Date last refill ordered: 4/18/23    Quantity ordered: 180    # refills: 11    Date of last clinic visit with ordering provider: 10/5/23    Date of next clinic visit with ordering provider: None scheduled

## 2024-05-16 ENCOUNTER — TELEPHONE (OUTPATIENT)
Dept: NEUROLOGY | Facility: CLINIC | Age: 69
End: 2024-05-16

## 2024-05-16 NOTE — TELEPHONE ENCOUNTER
Left Voicemail (1st Attempt) and Sent Mychart (1st Attempt) for the patient to call back and schedule the following:    Appointment type: Return Seizure  Provider: Roman  Return date: On or near 12/15/24  Specialty phone number: [phone]  Additional appointment(s) needed: NA  Additonal Notes: 1 year follow up at [facility]. If patient would like to stick with [facility] location, he will need to be added to our list to transfer his care.        Please send request to be added to new epilepsy [facility] provider list if patient would like to do so instead of following Dr Owens to [facility] on 5/16/2024 at 10:05 AM

## 2024-05-20 ENCOUNTER — TELEPHONE (OUTPATIENT)
Dept: NEUROLOGY | Facility: CLINIC | Age: 69
End: 2024-05-20

## 2024-05-28 NOTE — PROGRESS NOTES
Diagnosis/Summary/Recommendations:    PATIENT: Jose Loco  68 year old male     : 1955    BRUCE: 2024       MRN: [id number]    [address] [facility] [address]  [phone] (H)   [phone] (M)  No email  email is [email or web address]  Regina Loco  [phone]     Ongoing care  Dr. Rachna Barragan - radha Owens seen   Dr. Brandan Monson PCP     visit [phone]     Return back for face to face visit  Remain on sinemet for now. Consider weaning.         Assessment:  (G20) Parkinsonism, unspecified Parkinsonism type (H)  (primary encounter diagnosis)  (Z01.89) Probably normal dopamine scan (DaTSCAN). mild asymmetry noted   (primary encounter diagnosis)  (G20) Parkinsonism, unspecified Parkinsonism type (H)  (primary encounter diagnosis)  (F03.91) Dementia with behavioral disturbance, unspecified dementia type (H)     Dopamine scan Datscan - 2019  A presynaptic dopaminergic deficit not present. Some asymmetry noted.   Brain MRI - nonspecific changes 3/2019  CT scan 2021 - Normal head CT     Carbidopa/levodopa Sinemet 25/100 2 tabs 3/day @ 6, noon and 11pm     Review of diagnosis    Movement problems  Cognitive disorder  shuffling     Avoidance of dopamine blockers   Not taking     Motor complication review         Review of Impulse control disorders         Review of surgical or medication options         Gait/Balance/Falls         Exercise/Therapy performed/offered      Cognitive/Driving   Cognitive disorder  Autoimmune vs degenerative - stable  Not taking donepezil     Mood   Irritable at time  Lorazepam  Paroxetine - off this.   Lost both parents 2020  Mother had dementia; father covid  One grand daughter  Second grand child may 2020     Hallucinations/delusions         Sleep   Possible RLS/PLMS  Ketoprofen gel     Been sleepier than normal  Has not been as active  Falls asleep in the  morning  Saturdays usually out of bed by 7am  Going to bed around 9 or 10pm  Falls asleep within 30 minutes  Drifting off at times watching sporting event  Does up to urinate a few times per night   Sees Dr. Maddox for his sleep issues.      Ropinirole requip 1mg 4/day at 6am, 12noon, 6pm and 10-11pm  Rotigotine Neupro 3mg/24 hr patch        Bladder/Renal/Prostate/Gyn/Other  Urinary problems/gout  Has urinary frequency and urgency and is on the diuretic  Allopurinol zyloprim 100mg x 3 at noon     GI/Constipation/GERD   Gastric bypass  Alcohol consumption  Variable bowel function  Has had constipation and diarrhea in the past and is better now.  Not using ondansetron/zofran  No significant heartburn  Ondansetron zofran -not taking     ENDO/Lipid/DM/Bone density/Thyroid  Cholecalciferol Vitamin D3 2000 units twice daily   JULIA antibody  Elevated blood sugars with A1c was 6.8 on 10/30/2020  Simvastatin zocor 40mg      Cardio/heart/Hyper or Hypotensive   Right leg swelling - right ankle issue  4/2021  Ultrasound venous right leg:  No deep venous thrombosis in the right lower extremity.  Went to  lymphedema clinic long time ago  Furosemide lasix 40mg  Potassium chloride SA Kdur Klor con 20meq CR tablet    Pacemaker 8/21/2023 medtronic  Permanent pacemaker due to symptomatic chronotropic incompetence. 8/2023  Stable without any recurrent arrhythmic events.     Echo 8/23  1. Normal left ventricular chamber size. Normal left ventricular systolic function. Estimated left ventricular ejection fraction is 60-65%.  Calculated left ventricular ejection fraction (modified [place] technique) is 58 %. Normal left ventricular wall thickness. No regional  wall motion abnormalities.  2. Normal right ventricular chamber size. Normal right ventricular systolic function. Right ventricular systolic pressure cannot be  estimated due to inability to detect peak tricuspid regurgitation Doppler velocity.  3. No significant valvular heart  "disease.  4. No pericardial effusion.  5. Normal inferior vena cava.  6. Aortic sinus of Valsalva is normal in size (3.8 cm, ZScore = 0.16). Normal indexed ascending aorta dimension (3.6 cm, 1.7 cm/m ).  7. When compared to the previous echocardiographic report of 12/31/2021, there has been no significant change.  Estimated EF: 60-65%      Vision/Dry Eyes/Cataracts/Glaucoma/Macular         Heme/Anticoagulation/Antiplatelet/Anemia/Other  Iron   Hemoglobin was normal 10/30/2020  aspirin 81mg daily  Cyanocobalamin vitamin b12 1000mcg/ml injection  monthly  Ferrous fumarate 65mg,[place] FE, vitamin C 125mg vitron C 65-12 twice daily at noon and 6pm     ENT/Resp  Breathing  Has been good and not using the inhaler much if at all  Albuterol proair HFA/proventil HFA/ventolin (90 base) mcg/act inhaler  Fluticasone propionate flonase NA 50 mcg     Skin/Cancer/Seborrhea/other        Musculoskeletal/Pain/Headache  C spine injury  Has more leg pain - right foot is bad  Brace that was made.   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 This is based on interictal EEG and on apparent response to levetiracetam. Suspect he has had long term increased seizure tendency. I do not think seizures are related to cerebral degenerative syndrome or potential cerebral " auto-immune disorder.   2) Possible seizure in Sep 2022 but this is not at all certain. His encephalopathy could have been entirely related to cellulitis, fever, and elevated levetiracetam level. Last definite seizure Sep 2019. Alcohol could have played some role in that breakthrough seizure, reports no alcohol use currently. VEEG continues showing generalized seizure tendency. Lack of obvious seizures during periods without levetiracetam suggests that seizure tendency is not very high.  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 Again, alcohol did not help. MMSE Nov 2019 performed by self (media tab) was 27, essentially unchanged compared to current.  4) Restless leg syndrome; patient reports symptoms are worse; on methadone.  5) Worsening outbursts. Not clear whether this is an indication of his underlying dementia or depression. I suspect depression is playing the greater role given that he was doing better when taking SSRIs. Difficult to blame levetiracetam unless levels remain signifciantly elevated; behavior was fine for years while he was taking this medication.     PLAN:  1) Continue levetiracetam 1500 mg in AM and 2000 mg in PM. Refilled Rx today.  2) levetiracetam level today to rule out toxicity.  3) Urged follow up with primary care to consider reinitiation of selective serotonin reuptake inhibitor. Continue followup with neuroimmunology and movement disorders.  4) VPA (valproate) or ESM (ethosuximide) may be appropriate medications to consider next for seizures. Lamotrigine could possibly help with mood but may be more challenging to initiate in this situation.      He is continuing on medications for RLS  Ferrous fumarate 65mg,[place] FE, vitamin C 125mg vitron C   Gabapentin neurontin 800mg 3/day  Ropinirole requip 1mg 4/day   Rotigotine Neupro 3mg/24 hr patch  He may want to talk with his pcp about a recent study about dipyridamole and its b enefit on RLS  Here are the doses that were used in  the study   Dipyridamole 100mg/day x 1 week then 200mg/day x 1 week and if needed 300mg/day         He has been a bit irritable that may be related to his cognitive impairment and aggravated by mood issues as well as possibly keppra - they may want to talk with Dr. Owens about if 3500mg/day of keppra is needed or if he can get by with less.      RLS medications  From Dr. Maddox  Ferrous fumarate 65mg,[place] FE, vitamin C 125mg vitron C   Gabapentin neurontin 800mg 3/day  Ropinirole requip 1mg 4/day   Rotigotine Neupro 3mg/24 hr patch     Seizures - Dr. Owens 5/23/2023 return visit   Continuing Levetiracetam keppra 500mg     Has mood issues and is not taking a selective serotonin reuptake inhibitor  He had been on paroxetine and fluoxetine in the past  Recommend restarting the paroxetine and titrate the dose back up starting with 10mg and then increase weekly to 40mg     Gait disorder  Dopamine scan Datscan - 5/23/2019  A presynaptic dopaminergic deficit not present. Some asymmetry noted.   sinemet carbidopa/levodopa 2 tabs 3/day     Cognitive disorder - stable off donepezil      Constipation continues and was encouraged to use senokot S or/and polyethylene glycol miralax     Family history of prostate cancer  On a diuretic 2 x 40mg and 40mg - and has frequent urination  Will need a discussion about prostate cancer with pcp or urologist (referral placed if needed)     Continuing on sinemet      12/2021 was last visit with me     Return back in person 1 year     5/10/2023 had a fall and went in for a scan  He has a new pacemaker      Bladder issues  Has had blood monitoring for prostate cancer  Has a return appointment coming up  Sees someone at [facility]  He is on flomax  He is on allopurinol     He continues on seizure medication - levetiracetam     RLS   He continues on   Ropinirole requip 1mg 4/day   He is not taking the long acting roprinirole requip XL 4mg  - tier 1 or 6mg or 8am and see if can get off  the rotigotine patch  He is also on Rotigotine neupro 3mg patch - tier 2  He is taking gabapentin neurontin 800mg 3/day  He is taking iron  Medications coming from Dr. Maddox - seen in [place] - next appointment December 2023     Pharmacy ([facility]) consultation and medication management     Would recommend a slow wean off the sinemet to see if helpful or not needed     2-2-2 is his present sinemet regimen and would go to 2-1-2 to 1-1-2 to 1-1-1- and 1-0-1 and 0-0-1 and 0-0-0     Next may consider talking with sleep doctor about long acting requip XL instead of short acting and see how that goes and if possible to find a single dose of requip XL and see if can get off the rotigotine     Alternatively since he is doing relatively well may want to stay on same regimen.      Last visit was a phone call 3/2023     Return visit in 6-12 months.      1) Generalized epilepsy; likely absence and generalized tonic clonic seizures. This is based on interictal EEG and on apparent response to levetiracetam. Suspect he has had long term increased seizure tendency. I do not think seizures are related to cerebral degenerative syndrome or potential cerebral auto-immune disorder.   2) Possible seizure in Sep 2022 but this is not at all certain. His encephalopathy could have been entirely related to cellulitis, fever, and elevated levetiracetam level. Last definite seizure Sep 2019. Alcohol could have played some role in that breakthrough seizure, reports no alcohol use currently.   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 Again, alcohol did not help. Serial MMSEs since Nov 2019 unremarkable.  4) Restless leg syndrome complaints less significant today. Followed by movement disorders for potential PD; does not appear Parkinsonian today.  5) No complaints about behavioral outbursts today. Perhaps related to treatment with SSRIs. This confirms that levetiracetam was not playing a role.     PLAN:  1) Continue levetiracetam 1500 mg in  AM and 2000 mg in PM. Refilled Rx today.  2) VPA or ESM may be appropriate medications to consider next for seizures. Lamotrigine could possibly help with mood but may be more challenging to initiate in this situation.         Medications      6am 12p   6p 10/11pm     Albuterol proair ventolin inhaler  prn           Allopurinol zyloprim 100mg    3          Amoxicillin amoxil dental           Aspirin 81mg   1          Baclofen 10mg lioresal (Dr Galvez)  1     1      Benzonatate tessalon 100mg prn           Carbidopa/levodopa sinemet 25/100 2  2   2     Cholecalciferol vit D3 2000 units    1         Clotrimazole lotrimin 1% cream  stopped           Compression stockings             Cyanocobalamin vit b12 1000mcg monthly            Desonide desowen 0.05% cream             Diclofenac voltaren 1% gel             Diphenhydramine-zinc acet benadryl  prn           Diphenhydramine HCL ex 2% topical prn           Donepezil aricept 10mg  stopped           Ferrous fum 65mg,  vit C 125mg vitron C    1 1       Fluoxetine prozac 20mg Off           Fluticasone flonase NA 50 mcg prn           Furosemide lasix 40mg  2  1         Gabapentin neurontin 600mg  1 1   1     Gabapentin neurontin 300mg 1 1   1     Gabapentin neurontin 800mg (Dr Galvez) 1 1   1     PAM-KAMRON 8.6mg (senna senokot) no           Hydrocodone- acetaminophen norco  prn           Ipratropium albuterol duoneb neb prn           Ketoprofen 10% cream prn           Levetiracetam keppra 500mg 3     4     Lidocaine lidodermin 5% patch prn           Lorazepam ativan 1mg       prn     Losartan cozaar 25mg             Magnesium 250mg (review with Leonor)   1 1       Metformin glucophage XR 500mg 24hr      2       Methadone dolophine 5mg        1-2     Miralax polyethylene glycol  Prn            Naftifine naftin 2% gel prn           Neupro 3mg/24 hr pt24 below           Ondansetron zofran   stopped           Paroxetine paxil 10mg  40mg           Polyethylene glycol miralax  "above           Potassium chloride Kdur 20meq CR    1 1        Refresh optive advanced soln             Ropinirole requip 1mg  1 1 1 1 doll   Rotigotine Neupro 3mg/24 hr patch 1       doll   Simvastatin zocor 40mg        1     Tamsulosin flomax 0.4mg 1           Tramadol ultram 50mg Rarely prn           Triamcinolone kenalog cream prn           Unable to find - diabetic shoes              Unable to find - syringe             Unable  - gilmer/clon/hawa/imip/lido/pent 3-4/day            Valacyclovir valtrex 100mg prn           Venlafaxine effexor XR 75mg 24hr  ?           Venlafaxine effexor XR 150mg 24hr ?                Plan:    Last visit was 10/5/2023 discussed sinemet wean but lots of been happening and has remained on this due to ongoing medical issues. He continues on sinemet today.     Since he had pacemaker his heart has been better and not having low heart rate and passing out.     He does have daytime somnolence.  Discussed the differential of medication related sleepiness from requip vs sleep apnea, etc.  He had a sleep evaluation in the past.  He had sleepiness before the ropinirole and may need another sleep evaluation in the future. They were asking about INSPIRE for sleep apnea.     He has some dental teeth issues. - \"does not have many teeth left\"  He is not using a dental appliance. He has not been b rushing, etc.  He may get a partial denture.     Wife has granted proxy access.     Wife was subsequently in extended care and was then back on the regular care. She will have another surgery - she had right hip and will have left hip replaced and has an appointment at [facility] today.     There is a wedding [facility]   Bride is from near [place]  Yosvany getting .     Has 3 sons - mark, yosvany and liat  2 grand kids -     Return back in 9 months.         Coding statement:   Medical Decision Making:  #  Chronic progressive medical conditions addressed  - see above --   Review " and/or interpretation of unique test or documentation from a provider outside of neurology yes   Independent historian provided additional details  yes I  Prescription drug management and review of potential side effects and/or monitoring for side effects  -- see above ---  Health impacted by social determinants of health  no    I have reviewed the note as documented above.  This accurately captures the substance of my conversation with the patient and total time spent preparing for visit, executing visit and completing visit on the day of the visit:  30 minutes.  The portion of this total time included face to face time 25 minutes    The longitudinal plan of care for Jose Loco was addressed during this visit. Due to the added complexity in care, I will continue to support Jose Loco in the subsequent management of this condition(s) and with the ongoing continuity of care of this condition(s).      Garry Ochoa MD     ______________________________________    Last visit date and details:             ______________________________________      Patient was asked about 14 Review of systems including changes in vision (dry eyes, double vision), hearing, heart, lungs, musculoskeletal, depression, anxiety, snoring, RBD, insomnia, urinary frequency, urinary urgency, constipation, swallowing problems, hematological, ID, allergies, skin problems: seborrhea, endocrinological: thyroid, diabetes, cholesterol; balance, weight changes, and other neurological problems and these were not significant at this time except for   Patient Active Problem List   Diagnosis    Bariatric surgery status    Cervical spondylosis with myelopathy    Memory loss    Encephalopathy    Cryptogenic generalized epilepsy (H)    Positive glutamic acid decarboxylase antibody    Alcoholism (H)    Ataxia    Chronic leg pain    Chronic pain of left knee    Dementia with behavioral disturbance (H)    Depressive disorder    Diabetes  mellitus without complication (H)    Diabetes mellitus type 2, controlled (H)    Displacement of cervical intervertebral disc without myelopathy    Edema    Gout, unspecified    Routine general medical examination at a health care facility    HTN (hypertension)    Hypercholesteremia    Hyperlipidemia    Knee pain, bilateral    Neuropathy    Muscle spasm of both lower legs    Obesity    ROSY (obstructive sleep apnea)    Pain medication agreement    Postgastric surgery syndromes    Presbyopia of both eyes    Restless legs syndrome (RLS)    Probably normal dopamine scan (DaTSCAN). 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)    Bilateral inguinal hernia without obstruction or gangrene    Right inguinal hernia    Sepsis (H)    Umbilical hernia without obstruction and without gangrene    Edema due to malnutrition, due to unspecified malnutrition type (H24)    Nonsustained ventricular tachycardia (H)    Parkinsonism, unspecified Parkinsonism type (H)    Ulcer of left lower extremity with fat layer exposed (H)    Depression, major, single episode, moderate (H)          Allergies   Allergen Reactions    Seasonal Allergies Itching, Rash and Visual Disturbance     Past Surgical History:   Procedure Laterality Date    Cervical Cord Decompression  2002    GI SURGERY      gastric bypass surgery    HIP SURGERY Left     left hip surgery    KNEE SURGERY Left 2008    left knee arthroplasty     Past Medical History:   Diagnosis Date    ACP (advance care planning) 11/29/2012    Formatting of this note might be different from the original. Brochure given    Alcoholism (H) 07/25/2011    Overview:  No use since 2011.  Formatting of this note might be different from the original. No use since 2011.    Altered behavior 09/05/2019    Altered mental status 09/05/2019    Ataxia 01/15/2014    Overview:  Falls  Formatting of  this note might be different from the original. 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 Complex medical disorder with encephalopathy, ROSY, intermittent paralyses. Evaluation unremarkalble except for elevation of paraneoplastic antibodies, followed by neuroimmunology. MRI at [facility] reportedly normal, EEG here with generalized epileptiform abnormalities. LEV started with cessation of spells. Breakthru Aug 2017, levetiracetam increase    Dehydration 01/15/2014    Formatting of this note is different from the original. CREATININE                (mg/dL)  Date Value  1/15/2014 1.88*   (previous Creatinine 1.2)    Dementia (H) 01/15/2014    Overview:  March 2013: abnormal neuropsych testing at [facility]. 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 March 2013: abnormal neuropsych testing at [facility]. 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  HEMOGLOBIN A1C MONITORING (POCT) (% Total Hgb)  Date Value  9/5/2013 6.0   3/20/2013 6.7*  9/26/2012 5.7    Overview:  Formatting of this note might be different from the original.  HEMOGLOBIN A1C MONITORING (POCT) (% Total Hgb)  Date Value  9/5/2013 6.0   3/20/2013 6.7*  9/26/2012 5.7    Formatting of this note is different f    Diabetes mellitus without complication (H) 04/05/2006    Displacement of cervical intervertebral disc without myelopathy 10/05/2018    Overview:  2002 fall with disk herniation at C 6-7, cervical fusion. 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   Formatting of this note might be different from the original. 2002 fall with disk herniation at C 6-7, cervical fusion. 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    Edema 07/25/2011    Edema due to malnutrition, due to unspecified malnutrition type (H24) 08/02/2021    Encephalopathy 12/10/2015    Gout, unspecified 06/08/2011    HTN (hypertension) 05/10/2012    Overview:  Lisinopril  Formatting of this note might be different from the original. Lisinopril    Hyperlipidemia 01/09/2018    Knee pain, bilateral 07/16/2011    Memory loss 08/18/2015    Muscle spasm of both lower legs 10/30/2014    Neuropathy 07/16/2011    Overview:  Gabapentin  Formatting of this note might be different from the original. Gabapentin    Nonsustained ventricular tachycardia (H) 08/08/2022    Obesity 01/15/2014    Overview:  1/15/2014 Body mass index is 36.5 kg/(m^2).  Formatting of this note might be different from the original. 1/15/2014 Body mass index is 36.5 kg/(m^2).    ROSY (obstructive sleep apnea) 12/05/2012    Overview:  [facility] 11-28-12.  AHI 6.2  RDI 6.2  AHI REM 53.3  Oxygen Narayan 66%  Formatting of this note might be different from the original. [facility] 11-28-12.  AHI 6.2  RDI 6.2  AHI REM 53.3  Oxygen Narayan 66%    Pacemaker     Aug 2023    Pain medication agreement 06/07/2011    Overview:  Pain Clinic: MS Contin and Vicodin. 120 vicodin a month.  Formatting of this note might be different from the original. Pain Clinic: MS Contin and Vicodin. 120 vicodin a month.    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 mild asymmetry noted 2019 05/24/2019    Restless legs syndrome (RLS) 06/09/2008    Right inguinal hernia 07/19/2021    Routine general medical  examination at a health care facility 10/05/2018    Overview:  Colonoscopy Bone Denisty Last Lipids: Chol: 162    10/24/2006 T    10/24/2006 HDL:   32    10/24/2006 LDL:  90    10/24/2006 GLUCOSE                   (mg/dL)  Date             Value  2007          99  ---------- No results found for this basename:  PSASCREEN,PSADIAGNOSTI  Formatting of this note might be different from the original. Colonoscopy Bone Denisty Last Lipids: C    Seizures (H)     Sepsis (H) 2021    Substance abuse (H)     Umbilical hernia without obstruction and without gangrene 2021     Social History     Socioeconomic History    Marital status:      Spouse name: Not on file    Number of children: Not on file    Years of education: Not on file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

## 2024-05-29 ENCOUNTER — TELEPHONE (OUTPATIENT)
Dept: UROLOGY | Facility: CLINIC | Age: 69
End: 2024-05-29
Payer: COMMERCIAL

## 2024-05-29 DIAGNOSIS — N40.0 BPH (BENIGN PROSTATIC HYPERPLASIA): Primary | ICD-10-CM

## 2024-05-29 RX ORDER — TAMSULOSIN HYDROCHLORIDE 0.4 MG/1
0.4 CAPSULE ORAL DAILY
Qty: 90 CAPSULE | Refills: 1 | Status: SHIPPED | OUTPATIENT
Start: 2024-05-29

## 2024-06-09 ENCOUNTER — HEALTH MAINTENANCE LETTER (OUTPATIENT)
Age: 69
End: 2024-06-09

## 2024-06-14 ENCOUNTER — OFFICE VISIT (OUTPATIENT)
Dept: NEUROLOGY | Facility: CLINIC | Age: 69
End: 2024-06-14
Payer: COMMERCIAL

## 2024-06-14 VITALS
HEART RATE: 60 BPM | DIASTOLIC BLOOD PRESSURE: 80 MMHG | RESPIRATION RATE: 16 BRPM | OXYGEN SATURATION: 94 % | SYSTOLIC BLOOD PRESSURE: 127 MMHG

## 2024-06-14 DIAGNOSIS — Z95.0 CARDIAC PACEMAKER IN SITU: ICD-10-CM

## 2024-06-14 DIAGNOSIS — G20.C PARKINSONISM, UNSPECIFIED PARKINSONISM TYPE (H): Primary | ICD-10-CM

## 2024-06-14 PROBLEM — R11.0 NAUSEA: Status: ACTIVE | Noted: 2024-04-17

## 2024-06-14 PROBLEM — G20.A1 PARKINSON'S DISEASE WITHOUT DYSKINESIA, UNSPECIFIED WHETHER MANIFESTATIONS FLUCTUATE (H): Status: ACTIVE | Noted: 2024-01-08

## 2024-06-14 PROCEDURE — G2211 COMPLEX E/M VISIT ADD ON: HCPCS | Performed by: PSYCHIATRY & NEUROLOGY

## 2024-06-14 PROCEDURE — 99214 OFFICE O/P EST MOD 30 MIN: CPT | Performed by: PSYCHIATRY & NEUROLOGY

## 2024-06-14 RX ORDER — AMMONIUM LACTATE 12 G/100G
LOTION TOPICAL
COMMUNITY
Start: 2024-02-06

## 2024-06-14 RX ORDER — PAROXETINE 40 MG/1
40 TABLET, FILM COATED ORAL EVERY MORNING
COMMUNITY

## 2024-06-14 RX ORDER — CARBIDOPA AND LEVODOPA 25; 100 MG/1; MG/1
TABLET ORAL
Qty: 540 TABLET | Refills: 11 | Status: SHIPPED | OUTPATIENT
Start: 2024-06-14

## 2024-06-14 ASSESSMENT — PAIN SCALES - GENERAL: PAINLEVEL: MODERATE PAIN (5)

## 2024-06-14 NOTE — PATIENT INSTRUCTIONS
Medications      6am 12p   6p 10/11pm     Albuterol proair ventolin inhaler  prn           Allopurinol zyloprim 100mg    3          Amoxicillin amoxil dental           Aspirin 81mg   1          Baclofen 10mg lioresal (Dr Galvez)  1     1      Benzonatate tessalon 100mg prn           Carbidopa/levodopa sinemet 25/100 2  2   2     Cholecalciferol vit D3 2000 units    1         Clotrimazole lotrimin 1% cream  stopped           Compression stockings             Cyanocobalamin vit b12 1000mcg monthly            Desonide desowen 0.05% cream             Diclofenac voltaren 1% gel             Diphenhydramine-zinc acet benadryl  prn           Diphenhydramine HCL ex 2% topical prn           Donepezil aricept 10mg  stopped           Ferrous fum 65mg,  vit C 125mg vitron C    1 1       Fluoxetine prozac 20mg Off           Fluticasone flonase NA 50 mcg prn           Furosemide lasix 40mg  2  1         Gabapentin neurontin 600mg  1 1   1     Gabapentin neurontin 300mg 1 1   1     Gabapentin neurontin 800mg (Dr Galvez) 1 1   1     PAM-KAMRON 8.6mg (senna senokot) no           Hydrocodone- acetaminophen norco  prn           Ipratropium albuterol duoneb neb prn           Ketoprofen 10% cream prn           Levetiracetam keppra 500mg 3     4     Lidocaine lidodermin 5% patch prn           Lorazepam ativan 1mg       prn     Losartan cozaar 25mg             Magnesium 250mg (review with Leonor)   1 1       Metformin glucophage XR 500mg 24hr      2       Methadone dolophine 5mg        1-2     Miralax polyethylene glycol  Prn            Naftifine naftin 2% gel prn           Neupro 3mg/24 hr pt24 below           Ondansetron zofran   stopped           Paroxetine paxil 10mg  40mg           Polyethylene glycol miralax above           Potassium chloride Kdur 20meq CR    1 1        Refresh optive advanced soln             Ropinirole requip 1mg  1 1 1 1 doll   Rotigotine Neupro 3mg/24 hr patch 1       doll   Simvastatin zocor 40mg        1  "    Tamsulosin flomax 0.4mg 1           Tramadol ultram 50mg Rarely prn           Triamcinolone kenalog cream prn           Unable to find - diabetic shoes              Unable to find - syringe             Unable  - gilmer/clon/hawa/imip/lido/pent 3-4/day            Valacyclovir valtrex 100mg prn           Venlafaxine effexor XR 75mg 24hr  ?           Venlafaxine effexor XR 150mg 24hr ?                Plan:    Last visit was 10/5/2023 discussed sinemet wean but lots of been happening and has remained on this due to ongoing medical issues. He continues on sinemet today.     Since he had pacemaker his heart has been better and not having low heart rate and passing out.     He does have daytime somnolence.  Discussed the differential of medication related sleepiness from requip vs sleep apnea, etc.  He had a sleep evaluation in the past.  He had sleepiness before the ropinirole and may need another sleep evaluation in the future. They were asking about INSPIRE for sleep apnea.     He has some dental teeth issues. - \"does not have many teeth left\"  He is not using a dental appliance. He has not been b rushing, etc.  He may get a partial denture.     Wife has granted proxy access.     Wife was subsequently in extended care and was then back on the regular care. She will have another surgery - she had right hip and will have left hip replaced and has an appointment at [facility] today.     There is a wedding in [facility]   Bride is from near [place]  Yosvany getting .     Has 3 sons - yosvany flores and liat  2 grand kids -     Return back in 9 months.   "

## 2024-06-14 NOTE — LETTER
2024       RE: Jose Loco  [address] [facility] [address]     Dear Colleague,    Thank you for referring your patient, Jose Loco, to the [facility] [place] at [facility]. Please see a copy of my visit note below.        Diagnosis/Summary/Recommendations:    PATIENT: Jose Loco  68 year old male     : 1955    BRUCE: 2024       MRN: [id number]    [address] [facility] [address]  [phone] ()   [phone] (M)  No email  email is [email or web address]  Regina Loco  [phone]     Ongoing care  Dr. Rachna Barragan - radha Owens seen   Dr. Brandan Monson PCP     visit [phone]     Return back for face to face visit  Remain on sinemet for now. Consider weaning.         Assessment:  (G20) Parkinsonism, unspecified Parkinsonism type (H)  (primary encounter diagnosis)  (Z01.89) Probably normal dopamine scan (DaTSCAN). mild asymmetry noted   (primary encounter diagnosis)  (G20) Parkinsonism, unspecified Parkinsonism type (H)  (primary encounter diagnosis)  (F03.91) Dementia with behavioral disturbance, unspecified dementia type (H)     Dopamine scan Datscan - 2019  A presynaptic dopaminergic deficit not present. Some asymmetry noted.   Brain MRI - nonspecific changes 3/2019  CT scan 2021 - Normal head CT     Carbidopa/levodopa Sinemet 25/100 2 tabs 3/day @ 6, noon and 11pm     Review of diagnosis    Movement problems  Cognitive disorder  shuffling     Avoidance of dopamine blockers   Not taking     Motor complication review         Review of Impulse control disorders         Review of surgical or medication options         Gait/Balance/Falls         Exercise/Therapy performed/offered      Cognitive/Driving   Cognitive disorder  Autoimmune vs degenerative - stable  Not taking donepezil     Mood   Irritable  at time  Lorazepam  Paroxetine - off this.   Lost both parents December 2020  Mother had dementia; father covid  One grand daughter  Second grand child may 2020     Hallucinations/delusions         Sleep   Possible RLS/PLMS  Ketoprofen gel     Been sleepier than normal  Has not been as active  Falls asleep in the morning  Saturdays usually out of bed by 7am  Going to bed around 9 or 10pm  Falls asleep within 30 minutes  Drifting off at times watching sporting event  Does up to urinate a few times per night   Sees Dr. Maddox for his sleep issues.      Ropinirole requip 1mg 4/day at 6am, 12noon, 6pm and 10-11pm  Rotigotine Neupro 3mg/24 hr patch        Bladder/Renal/Prostate/Gyn/Other  Urinary problems/gout  Has urinary frequency and urgency and is on the diuretic  Allopurinol zyloprim 100mg x 3 at noon     GI/Constipation/GERD   Gastric bypass  Alcohol consumption  Variable bowel function  Has had constipation and diarrhea in the past and is better now.  Not using ondansetron/zofran  No significant heartburn  Ondansetron zofran -not taking     ENDO/Lipid/DM/Bone density/Thyroid  Cholecalciferol Vitamin D3 2000 units twice daily   JULIA antibody  Elevated blood sugars with A1c was 6.8 on 10/30/2020  Simvastatin zocor 40mg      Cardio/heart/Hyper or Hypotensive   Right leg swelling - right ankle issue  4/2021  Ultrasound venous right leg:  No deep venous thrombosis in the right lower extremity.  Went to  lymphedema clinic long time ago  Furosemide lasix 40mg  Potassium chloride SA Kdur Klor con 20meq CR tablet    Pacemaker 8/21/2023 medtronic  Permanent pacemaker due to symptomatic chronotropic incompetence. 8/2023  Stable without any recurrent arrhythmic events.     Echo 8/23  1. Normal left ventricular chamber size. Normal left ventricular systolic function. Estimated left ventricular ejection fraction is 60-65%.  Calculated left ventricular ejection fraction (modified [place] technique) is 58 %. Normal left  "ventricular wall thickness. No regional  wall motion abnormalities.  2. Normal right ventricular chamber size. Normal right ventricular systolic function. Right ventricular systolic pressure cannot be  estimated due to inability to detect peak tricuspid regurgitation Doppler velocity.  3. No significant valvular heart disease.  4. No pericardial effusion.  5. Normal inferior vena cava.  6. Aortic sinus of Valsalva is normal in size (3.8 cm, ZScore = 0.16). Normal indexed ascending aorta dimension (3.6 cm, 1.7 cm/m ).  7. When compared to the previous echocardiographic report of 12/31/2021, there has been no significant change.  Estimated EF: 60-65%      Vision/Dry Eyes/Cataracts/Glaucoma/Macular         Heme/Anticoagulation/Antiplatelet/Anemia/Other  Iron   Hemoglobin was normal 10/30/2020  aspirin 81mg daily  Cyanocobalamin vitamin b12 1000mcg/ml injection  monthly  Ferrous fumarate 65mg,[place] FE, vitamin C 125mg vitron C 65-12 twice daily at noon and 6pm     ENT/Resp  Breathing  Has been good and not using the inhaler much if at all  Albuterol proair HFA/proventil HFA/ventolin (90 base) mcg/act inhaler  Fluticasone propionate flonase NA 50 mcg     Skin/Cancer/Seborrhea/other        Musculoskeletal/Pain/Headache  C spine injury  Has more leg pain - right foot is bad  Brace that was made.   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 This is based on interictal EEG and on apparent response to levetiracetam. Suspect he has had long term increased seizure tendency. I do not think seizures are related to cerebral degenerative syndrome or potential cerebral auto-immune disorder.   2) Possible seizure in Sep 2022 but this is not at all certain. His encephalopathy could have been entirely related to cellulitis, fever, and elevated levetiracetam level. Last definite seizure Sep 2019. Alcohol could have played some role in that breakthrough seizure, reports no alcohol use currently. VEEG continues showing generalized seizure tendency. Lack of obvious seizures during periods without levetiracetam suggests that seizure tendency is not very high.  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 Again, alcohol did not help. MMSE Nov 2019 performed by self (media tab) was 27, essentially unchanged compared to current.  4) Restless leg syndrome; patient reports symptoms are worse; on methadone.  5) Worsening outbursts. Not clear whether this is an indication of his underlying dementia or depression. I suspect depression is playing the greater role given that he was doing better when taking SSRIs. Difficult to blame levetiracetam unless levels remain signifciantly elevated; behavior was fine for years while he was taking this medication.     PLAN:  1) Continue levetiracetam 1500 mg in AM and 2000 mg in PM. Refilled Rx today.  2) levetiracetam level today to rule out toxicity.  3) Urged follow up with primary care to consider reinitiation of selective serotonin reuptake inhibitor. Continue followup with neuroimmunology and movement disorders.  4) VPA (valproate) or ESM (ethosuximide) may be appropriate medications to consider next for seizures. Lamotrigine could possibly help with mood but may be more challenging to initiate in this situation.      He is continuing on  medications for RLS  Ferrous fumarate 65mg,[place] FE, vitamin C 125mg vitron C   Gabapentin neurontin 800mg 3/day  Ropinirole requip 1mg 4/day   Rotigotine Neupro 3mg/24 hr patch  He may want to talk with his pcp about a recent study about dipyridamole and its b enefit on RLS  Here are the doses that were used in the study   Dipyridamole 100mg/day x 1 week then 200mg/day x 1 week and if needed 300mg/day         He has been a bit irritable that may be related to his cognitive impairment and aggravated by mood issues as well as possibly keppra - they may want to talk with Dr. Owens about if 3500mg/day of keppra is needed or if he can get by with less.      RLS medications  From Dr. Maddox  Ferrous fumarate 65mg,[place] FE, vitamin C 125mg vitron C   Gabapentin neurontin 800mg 3/day  Ropinirole requip 1mg 4/day   Rotigotine Neupro 3mg/24 hr patch     Seizures - Dr. Owens 5/23/2023 return visit   Continuing Levetiracetam keppra 500mg     Has mood issues and is not taking a selective serotonin reuptake inhibitor  He had been on paroxetine and fluoxetine in the past  Recommend restarting the paroxetine and titrate the dose back up starting with 10mg and then increase weekly to 40mg     Gait disorder  Dopamine scan Datscan - 5/23/2019  A presynaptic dopaminergic deficit not present. Some asymmetry noted.   sinemet carbidopa/levodopa 2 tabs 3/day     Cognitive disorder - stable off donepezil      Constipation continues and was encouraged to use senokot S or/and polyethylene glycol miralax     Family history of prostate cancer  On a diuretic 2 x 40mg and 40mg - and has frequent urination  Will need a discussion about prostate cancer with pcp or urologist (referral placed if needed)     Continuing on sinemet      12/2021 was last visit with me     Return back in person 1 year     5/10/2023 had a fall and went in for a scan  He has a new pacemaker      Bladder issues  Has had blood monitoring for prostate  cancer  Has a return appointment coming up  Sees someone at [facility]  He is on flomax  He is on allopurinol     He continues on seizure medication - levetiracetam     RLS   He continues on   Ropinirole requip 1mg 4/day   He is not taking the long acting roprinirole requip XL 4mg  - tier 1 or 6mg or 8am and see if can get off the rotigotine patch  He is also on Rotigotine neupro 3mg patch - tier 2  He is taking gabapentin neurontin 800mg 3/day  He is taking iron  Medications coming from Dr. Maddox - seen in [place] - next appointment December 2023     Pharmacy ([facility]) consultation and medication management     Would recommend a slow wean off the sinemet to see if helpful or not needed     2-2-2 is his present sinemet regimen and would go to 2-1-2 to 1-1-2 to 1-1-1- and 1-0-1 and 0-0-1 and 0-0-0     Next may consider talking with sleep doctor about long acting requip XL instead of short acting and see how that goes and if possible to find a single dose of requip XL and see if can get off the rotigotine     Alternatively since he is doing relatively well may want to stay on same regimen.      Last visit was a phone call 3/2023     Return visit in 6-12 months.      1) Generalized epilepsy; likely absence and generalized tonic clonic seizures. This is based on interictal EEG and on apparent response to levetiracetam. Suspect he has had long term increased seizure tendency. I do not think seizures are related to cerebral degenerative syndrome or potential cerebral auto-immune disorder.   2) Possible seizure in Sep 2022 but this is not at all certain. His encephalopathy could have been entirely related to cellulitis, fever, and elevated levetiracetam level. Last definite seizure Sep 2019. Alcohol could have played some role in that breakthrough seizure, reports no alcohol use currently.   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 Again, alcohol did not help. Serial MMSEs since Nov 2019 unremarkable.  4) Restless leg  syndrome complaints less significant today. Followed by movement disorders for potential PD; does not appear Parkinsonian today.  5) No complaints about behavioral outbursts today. Perhaps related to treatment with SSRIs. This confirms that levetiracetam was not playing a role.     PLAN:  1) Continue levetiracetam 1500 mg in AM and 2000 mg in PM. Refilled Rx today.  2) VPA or ESM may be appropriate medications to consider next for seizures. Lamotrigine could possibly help with mood but may be more challenging to initiate in this situation.         Medications      6am 12p   6p 10/11pm     Albuterol proair ventolin inhaler  prn           Allopurinol zyloprim 100mg    3          Amoxicillin amoxil dental           Aspirin 81mg   1          Baclofen 10mg lioresal (Dr Galvez)  1     1      Benzonatate tessalon 100mg prn           Carbidopa/levodopa sinemet 25/100 2  2   2     Cholecalciferol vit D3 2000 units    1         Clotrimazole lotrimin 1% cream  stopped           Compression stockings             Cyanocobalamin vit b12 1000mcg monthly            Desonide desowen 0.05% cream             Diclofenac voltaren 1% gel             Diphenhydramine-zinc acet benadryl  prn           Diphenhydramine HCL ex 2% topical prn           Donepezil aricept 10mg  stopped           Ferrous fum 65mg,  vit C 125mg vitron C    1 1       Fluoxetine prozac 20mg Off           Fluticasone flonase NA 50 mcg prn           Furosemide lasix 40mg  2  1         Gabapentin neurontin 600mg  1 1   1     Gabapentin neurontin 300mg 1 1   1     Gabapentin neurontin 800mg (Dr Galvez) 1 1   1     PAM-KAMRON 8.6mg (senna senokot) no           Hydrocodone- acetaminophen norco  prn           Ipratropium albuterol duoneb neb prn           Ketoprofen 10% cream prn           Levetiracetam keppra 500mg 3     4     Lidocaine lidodermin 5% patch prn           Lorazepam ativan 1mg       prn     Losartan cozaar 25mg             Magnesium 250mg (review with Leonor)    "1 1       Metformin glucophage XR 500mg 24hr      2       Methadone dolophine 5mg        1-2     Miralax polyethylene glycol  Prn            Naftifine naftin 2% gel prn           Neupro 3mg/24 hr pt24 below           Ondansetron zofran   stopped           Paroxetine paxil 10mg  40mg           Polyethylene glycol miralax above           Potassium chloride Kdur 20meq CR    1 1        Refresh optive advanced soln             Ropinirole requip 1mg  1 1 1 1 doll   Rotigotine Neupro 3mg/24 hr patch 1       doll   Simvastatin zocor 40mg        1     Tamsulosin flomax 0.4mg 1           Tramadol ultram 50mg Rarely prn           Triamcinolone kenalog cream prn           Unable to find - diabetic shoes              Unable to find - syringe             Unable  - gilmer/clon/hawa/imip/lido/pent 3-4/day            Valacyclovir valtrex 100mg prn           Venlafaxine effexor XR 75mg 24hr  ?           Venlafaxine effexor XR 150mg 24hr ?                Plan:    Last visit was 10/5/2023 discussed sinemet wean but lots of been happening and has remained on this due to ongoing medical issues. He continues on sinemet today.     Since he had pacemaker his heart has been better and not having low heart rate and passing out.     He does have daytime somnolence.  Discussed the differential of medication related sleepiness from requip vs sleep apnea, etc.  He had a sleep evaluation in the past.  He had sleepiness before the ropinirole and may need another sleep evaluation in the future. They were asking about INSPIRE for sleep apnea.     He has some dental teeth issues. - \"does not have many teeth left\"  He is not using a dental appliance. He has not been b rushing, etc.  He may get a partial denture.     Wife has granted proxy access.     Wife was subsequently in extended care and was then back on the regular care. She will have another surgery - she had right hip and will have left hip replaced and has an appointment at [facility] " today.     There is a wedding in [facility]   Bride is from near [place]  Yosvany getting .     Has 3 sons - yosvany flores and liat  2 grand kids -     Return back in 9 months.         Coding statement:   Medical Decision Making:  #  Chronic progressive medical conditions addressed  - see above --   Review and/or interpretation of unique test or documentation from a provider outside of neurology yes   Independent historian provided additional details  yes I  Prescription drug management and review of potential side effects and/or monitoring for side effects  -- see above ---  Health impacted by social determinants of health  no    I have reviewed the note as documented above.  This accurately captures the substance of my conversation with the patient and total time spent preparing for visit, executing visit and completing visit on the day of the visit:  30 minutes.  The portion of this total time included face to face time 25 minutes    The longitudinal plan of care for Jose Loco was addressed during this visit. Due to the added complexity in care, I will continue to support Jose Loco in the subsequent management of this condition(s) and with the ongoing continuity of care of this condition(s).      Garry Ochoa MD     ______________________________________    Last visit date and details:             ______________________________________      Patient was asked about 14 Review of systems including changes in vision (dry eyes, double vision), hearing, heart, lungs, musculoskeletal, depression, anxiety, snoring, RBD, insomnia, urinary frequency, urinary urgency, constipation, swallowing problems, hematological, ID, allergies, skin problems: seborrhea, endocrinological: thyroid, diabetes, cholesterol; balance, weight changes, and other neurological problems and these were not significant at this time except for   Patient Active Problem List   Diagnosis     Bariatric surgery status      Cervical spondylosis with myelopathy     Memory loss     Encephalopathy     Cryptogenic generalized epilepsy (H)     Positive glutamic acid decarboxylase antibody     Alcoholism (H)     Ataxia     Chronic leg pain     Chronic pain of left knee     Dementia with behavioral disturbance (H)     Depressive disorder     Diabetes mellitus without complication (H)     Diabetes mellitus type 2, controlled (H)     Displacement of cervical intervertebral disc without myelopathy     Edema     Gout, unspecified     Routine general medical examination at a health care facility     HTN (hypertension)     Hypercholesteremia     Hyperlipidemia     Knee pain, bilateral     Neuropathy     Muscle spasm of both lower legs     Obesity     ROSY (obstructive sleep apnea)     Pain medication agreement     Postgastric surgery syndromes     Presbyopia of both eyes     Restless legs syndrome (RLS)     Probably normal dopamine scan (DaTSCAN). 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)     Bilateral inguinal hernia without obstruction or gangrene     Right inguinal hernia     Sepsis (H)     Umbilical hernia without obstruction and without gangrene     Edema due to malnutrition, due to unspecified malnutrition type (H24)     Nonsustained ventricular tachycardia (H)     Parkinsonism, unspecified Parkinsonism type (H)     Ulcer of left lower extremity with fat layer exposed (H)     Depression, major, single episode, moderate (H)          Allergies   Allergen Reactions     Seasonal Allergies Itching, Rash and Visual Disturbance     Past Surgical History:   Procedure Laterality Date     Cervical Cord Decompression  2002     GI SURGERY      gastric bypass surgery     HIP SURGERY Left     left hip surgery     KNEE SURGERY Left 2008    left knee arthroplasty     Past Medical History:   Diagnosis Date     ACP (advance care  planning) 11/29/2012    Formatting of this note might be different from the original. Brochure given     Alcoholism (H) 07/25/2011    Overview:  No use since 2011.  Formatting of this note might be different from the original. No use since 2011.     Altered behavior 09/05/2019     Altered mental status 09/05/2019     Ataxia 01/15/2014    Overview:  Falls  Formatting of this note might be different from the original. Falls     Bariatric surgery status 08/18/2015     Bilateral inguinal hernia without obstruction or gangrene 07/21/2021     Chronic leg pain 10/30/2014     Chronic pain of left knee 08/14/2015     Cryptogenic generalized epilepsy (H) 12/10/2015    Staring spells and major motor attacks started age 57. Complex medical disorder with encephalopathy, ROSY, intermittent paralyses. Evaluation unremarkalble except for elevation of paraneoplastic antibodies, followed by neuroimmunology. MRI at [facility] reportedly normal, EEG here with generalized epileptiform abnormalities. LEV started with cessation of spells. Breakthru Aug 2017, levetiracetam increase     Dehydration 01/15/2014    Formatting of this note is different from the original. CREATININE                (mg/dL)  Date Value  1/15/2014 1.88*   (previous Creatinine 1.2)     Dementia (H) 01/15/2014    Overview:  March 2013: abnormal neuropsych testing at [facility]. 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 March 2013: abnormal neuropsych testing at [facility]. 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  HEMOGLOBIN A1C MONITORING (POCT) (% Total Hgb)  Date Value  9/5/2013 6.0   3/20/2013 6.7*  9/26/2012 5.7    Overview:  Formatting of this note might be different from the original.  HEMOGLOBIN A1C  MONITORING (POCT) (% Total Hgb)  Date Value  9/5/2013 6.0   3/20/2013 6.7*  9/26/2012 5.7    Formatting of this note is different f     Diabetes mellitus without complication (H) 04/05/2006     Displacement of cervical intervertebral disc without myelopathy 10/05/2018    Overview:  2002 fall with disk herniation at C 6-7, cervical fusion. 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  Formatting of this note might be different from the original. 2002 fall with disk herniation at C 6-7, cervical fusion. 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     Edema 07/25/2011     Edema due to malnutrition, due to unspecified malnutrition type (H24) 08/02/2021     Encephalopathy 12/10/2015     Gout, unspecified 06/08/2011     HTN (hypertension) 05/10/2012    Overview:  Lisinopril  Formatting of this note might be different from the original. Lisinopril     Hyperlipidemia 01/09/2018     Knee pain, bilateral 07/16/2011     Memory loss 08/18/2015     Muscle spasm of both lower legs 10/30/2014     Neuropathy 07/16/2011    Overview:  Gabapentin  Formatting of this note might be different from the original. Gabapentin     Nonsustained ventricular tachycardia (H) 08/08/2022     Obesity 01/15/2014    Overview:  1/15/2014 Body mass index is 36.5 kg/(m^2).  Formatting of this note might be different from the original. 1/15/2014 Body mass index is 36.5 kg/(m^2).     ROSY (obstructive sleep apnea) 12/05/2012    Overview:  [facility] 11-28-12.  AHI 6.2  RDI 6.2  AHI REM 53.3  Oxygen Narayan 66%  Formatting of this note might be different from the original. [facility] 11-28-12.  AHI 6.2  RDI 6.2  AHI REM 53.3  Oxygen Narayan 66%     Pacemaker     Aug 2023     Pain medication agreement 06/07/2011    Overview:  Pain Clinic: MS Contin and Vicodin. 120 vicodin a month.  Formatting of this note might be different from the original. Pain Clinic: MS Contin and Vicodin. 120 vicodin a month.     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 mild asymmetry noted 2019     Restless legs syndrome (RLS) 2008     Right inguinal hernia 2021     Routine general medical examination at a health care facility 10/05/2018    Overview:  Colonoscopy Bone Denisty Last Lipids: Chol: 162    10/24/2006 T    10/24/2006 HDL:   32    10/24/2006 LDL:  90    10/24/2006 GLUCOSE                   (mg/dL)  Date             Value  2007          99  ---------- No results found for this basename:  PSASCREEN,PSADIAGNOSTI  Formatting of this note might be different from the original. Colonoscopy Bone Denisty Last Lipids: C     Seizures (H)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

## 2024-06-18 ENCOUNTER — TELEPHONE (OUTPATIENT)
Dept: NEUROLOGY | Facility: CLINIC | Age: 69
End: 2024-06-18
Payer: COMMERCIAL

## 2024-06-18 NOTE — TELEPHONE ENCOUNTER
Left Voicemail (1st Attempt) and Sent Mychart (1st Attempt) for the patient to call back and schedule the following:    Appointment type: Return Movement Disorder  Provider: Gabriela  Return date: around 2/14/2025   Specialty phone number: [phone]  Additional appointment(s) needed:   Additonal Notes:       Adore Herrera on 6/18/2024 at 4:59 PM

## 2024-06-20 NOTE — TELEPHONE ENCOUNTER
Patient Contacted for the patient to call back and schedule the following: Pt wife will call back to schedule    Appointment type: Return Movement Disorder  Provider: Gabriela  Return date: around 2/14/2025  Specialty phone number: [phone]  Additional appointment(s) needed:   Additonal Notes: 8 month follow up      Adore Herrera on 6/20/2024 at 10:57 AM

## 2024-10-11 ENCOUNTER — TELEPHONE (OUTPATIENT)
Dept: VASCULAR SURGERY | Facility: CLINIC | Age: 69
End: 2024-10-11
Payer: COMMERCIAL

## 2024-10-11 NOTE — TELEPHONE ENCOUNTER
Caller: Regina    Provider: MD Benjamin Salinas    Detailed reason for call: Spouse called to ask to schedule an ASAP visit with Dr. Salinas.  She states his opposite leg that Dr. Salinas treated is having a lot of swelling and pain that has been going on for about the last 6 months but worse the last month or so.  She states he wears his compression every day.  's next opening was 12/11/24.  The pateint is scheduled and placed on the wait list.  The spouse is asking for a call back to discuss and advise if there's another provider he should see or if we want to get him in sooner to see Dr. Salinas.      Best phone number to contact: [phone]    Best time to contact: any    Ok to leave a detailed message: Yes    Ok to speak to authorized person if needed: No      (Noted to patient if reason is related to wound or incision, to please send a photo via email or [employer].)

## 2024-10-14 NOTE — TELEPHONE ENCOUNTER
Called patient's wife Regina who reports that patient has had significant pain and swelling in his right leg for 6 months or more. She states that he also has trouble with that foot as he needs a surgery, but the podiatrist explained that it would be very extensive. They are continuing to follow with podiatry for the right foot. Currently scheduled with Dr. Salinas in December at next available appointment as she would like to see if patient has varicose veins in the right leg. Patient using compression regularly. Advised Regina to have patient see his primary care provider in the meantime to evaluate his right leg as this seems to be a chronic issue for him. They can send over and urgent referral request if needed. She verbalized understanding, and was in agreement with plan.

## 2024-10-23 ENCOUNTER — OFFICE VISIT (OUTPATIENT)
Dept: VASCULAR SURGERY | Facility: CLINIC | Age: 69
End: 2024-10-23
Attending: SPECIALIST
Payer: COMMERCIAL

## 2024-10-23 VITALS
DIASTOLIC BLOOD PRESSURE: 65 MMHG | OXYGEN SATURATION: 94 % | HEART RATE: 63 BPM | SYSTOLIC BLOOD PRESSURE: 101 MMHG | RESPIRATION RATE: 16 BRPM

## 2024-10-23 DIAGNOSIS — I89.0 LYMPHEDEMA: Primary | ICD-10-CM

## 2024-10-23 DIAGNOSIS — M79.89 LEG SWELLING: ICD-10-CM

## 2024-10-23 DIAGNOSIS — I83.892 SYMPTOMATIC VARICOSE VEINS, LEFT: ICD-10-CM

## 2024-10-23 PROCEDURE — 99213 OFFICE O/P EST LOW 20 MIN: CPT | Performed by: SPECIALIST

## 2024-10-23 PROCEDURE — G0463 HOSPITAL OUTPT CLINIC VISIT: HCPCS | Performed by: SPECIALIST

## 2024-10-23 NOTE — PROGRESS NOTES
[facility]        Patient is here for a 6 month follow up  to discuss varicose vein(s). The patient has varicose veins that are problematic in bilateral legs, worse in the right leg. Symptoms patient has been experiencing are aching, cramps, and  swelling. Patient states their varicose veins are bothersome when  at rest after doing things around the house/outside . Patient has been using pain medication or anti-inflammatory's. Patient has not had recent imaging on legs done. Patient has done conservative measures which include: compression stockings and elevation. Treatment has been unsuccessful due to still having symptoms. Educated patient to work on conservative treatments.  Advised to continue compression and see Dr burch for swelling r>L.    Pt is currently taking Aspirin and Statin.    There were no vitals taken for this visit.    The provider has been notified that the patient has concerns of increased pain and swelling in Rt leg.     Questions patient would like addressed today are: see above.    Refills are needed: N/A    Has homecare services and agency name:  No- spouse takes care of patient

## 2024-10-23 NOTE — PATIENT INSTRUCTIONS
Swelling and Compression Therapy    Swelling in the legs can be caused by many reasons. No matter what the reason, treatment usually includes some type of compression. This may be done with a support sock, dressings, short stretch bandaging, velcro compression or layered wraps.     It is important to treat the swelling for many reasons. If the swelling is not treated you may develop blisters that can lead to ulcerations. This is caused when extra fluid goes into tissue causing damage and blocking blood flow to the tissue.     It is important that you wear your compression every day, including days that you will be seen in clinic.     Compression is often the most important part of treating leg wounds. Without controlling the swelling it is often not possible to heal wounds.     Going without compression for even brief periods of time can be damaging to your legs and your health.  Your compression should be put on first thing in the morning. Take the compression off at night only when instructed by your care provider to do so. Sometimes wearing compression 24 hours a day will be recommended.       If you are having difficulty wearing your compression it is important to notify your care provider so that other options may be reviewed.    Please call us if you have any questions [phone].    Thank you for choosing [facility].

## 2024-10-25 NOTE — PROGRESS NOTES
[facility] Surgery Follow up    HPI:    68 year old year old male who returns for a follow up.  He had closure of his left greater saphenous vein.  This is 6 months ago back in April here for follow-up 6 months he is continue wear compression exercise work on his weight and still has continued ongoing swelling of both legs.  He was not a candidate for closure of the right side.    Allergies:Seasonal allergies    Past Medical History:   Diagnosis Date    ACP (advance care planning) 11/29/2012    Formatting of this note might be different from the original. Brochure given    Alcoholism (H) 07/25/2011    Overview:  No use since 2011.  Formatting of this note might be different from the original. No use since 2011.    Altered behavior 09/05/2019    Altered mental status 09/05/2019    Ataxia 01/15/2014    Overview:  Falls  Formatting of this note might be different from the original. 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 Complex medical disorder with encephalopathy, ROSY, intermittent paralyses. Evaluation unremarkalble except for elevation of paraneoplastic antibodies, followed by neuroimmunology. MRI at [place] reportedly normal, EEG here with generalized epileptiform abnormalities. LEV started with cessation of spells. Breakthru Aug 2017, levetiracetam increase    Dehydration 01/15/2014    Formatting of this note is different from the original. CREATININE                (mg/dL)  Date Value  1/15/2014 1.88*   (previous Creatinine 1.2)    Dementia (H) 01/15/2014    Overview:  March 2013: abnormal neuropsych testing at [facility]. Jan 2015 - Dx as Pick's disease (frontoparietal dementia)  Formatting of this note might be different  from the original. March 2013: abnormal neuropsych testing at [facility]. 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  HEMOGLOBIN A1C MONITORING (POCT) (% Total Hgb)  Date Value  9/5/2013 6.0   3/20/2013 6.7*  9/26/2012 5.7    Overview:  Formatting of this note might be different from the original.  HEMOGLOBIN A1C MONITORING (POCT) (% Total Hgb)  Date Value  9/5/2013 6.0   3/20/2013 6.7*  9/26/2012 5.7    Formatting of this note is different f    Diabetes mellitus without complication (H) 04/05/2006    Displacement of cervical intervertebral disc without myelopathy 10/05/2018    Overview:  2002 fall with disk herniation at C 6-7, cervical fusion. 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  Formatting of this note might be different from the original. 2002 fall with disk herniation at C 6-7, cervical fusion. 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    Edema 07/25/2011    Edema due to malnutrition, due to unspecified malnutrition type (H) 08/02/2021    Encephalopathy 12/10/2015    Gout, unspecified 06/08/2011    HTN (hypertension) 05/10/2012    Overview:  Lisinopril  Formatting of this note might be different from the original. Lisinopril    Hyperlipidemia 01/09/2018    Knee pain, bilateral 07/16/2011    Memory loss 08/18/2015    Muscle spasm of both lower legs 10/30/2014    Neuropathy 07/16/2011    Overview:  Gabapentin  Formatting of this note might be different from the original. Gabapentin    Nonsustained ventricular tachycardia (H) 08/08/2022    Obesity 01/15/2014    Overview:  1/15/2014 Body mass index is 36.5 kg/(m^2).  Formatting of this note might be different from the original. 1/15/2014 Body mass index is 36.5 kg/(m^2).    ROSY (obstructive sleep apnea) 12/05/2012    Overview:  [facility] 11-28-12.  AHI 6.2  RDI 6.2   AHI REM 53.3  Oxygen Narayan 66%  Formatting of this note might be different from the original. [facility] 12.  AHI 6.2  RDI 6.2  AHI REM 53.3  Oxygen Narayan 66%    Pacemaker     Aug 2023    Pain medication agreement 2011    Overview:  Pain Clinic: MS Contin and Vicodin. 120 vicodin a month.  Formatting of this note might be different from the original. Pain Clinic: MS Contin and Vicodin. 120 vicodin a month.    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 mild asymmetry noted 2019    Restless legs syndrome (RLS) 2008    Right inguinal hernia 2021    Routine general medical examination at a health care facility 10/05/2018    Overview:  Colonoscopy Bone Denisty Last Lipids: Chol: 162    10/24/2006 T    10/24/2006 HDL:   32    10/24/2006 LDL:  90    10/24/2006 GLUCOSE                   (mg/dL)  Date             Value  2007          99  ---------- No results found for this basename:  PSASCREEN,PSADIAGNOSTI  Formatting of this note might be different from the original. Colonoscopy Bone Denisty Last Lipids: C    Seizures (H)     Sepsis (H) 2021    Substance abuse (H)     Umbilical hernia without obstruction and without gangrene 2021       Past Surgical History:   Procedure Laterality Date    Cervical Cord Decompression      GI SURGERY      gastric bypass surgery    HIP SURGERY Left     left hip surgery    IMPLANT PACEMAKER  2023    [facility]    KNEE SURGERY Left 2008    left knee arthroplasty       CURRENT MEDS:  Current Outpatient Medications   Medication Sig Dispense Refill    allopurinol (ZYLOPRIM) 100 MG tablet 3 x 100mg tab by mouth daily at noon      aspirin 81 MG  tablet Take 81 mg by mouth daily At noon      baclofen (LIORESAL) 10 MG tablet 10mg tab by mouth twice daily @ 6am and 10pm  2    benzonatate (TESSALON) 100 MG capsule Take 100 mg by mouth 3 times daily as needed      carbidopa-levodopa (SINEMET)  MG tablet TAKE TWO TABLETS BY MOUTH THREE TIMES A DAY (6AM, NOON, 11P.M.) 540 tablet 11    cholecalciferol (HM VITAMIN D3) 50 MCG (2000 UT) CAPS 2000 unit 50mcg capsule by mouth daily @ noon 100 capsule 2    COMPRESSION STOCKINGS 1 each      cyanocobalamin (CYANOCOBALAMIN) 1000 MCG/ML injection Inject 1,000 mcg into the muscle every 28 days      desonide (DESOWEN) 0.05 % external cream       diclofenac (VOLTAREN) 1 % topical gel APPLY 2 GRAMS TO AFFECTED AREA(S) FOUR TIMES DAILY      diphenhydrAMINE-zinc acetate (BENADRYL) 2-0.1 % external cream       ferrous fumarate 65 mg ([place]. FE)-Vitamin C 125 mg (VITRON C)  MG TABS tablet 1 tab by mouth twice daily @ noon and 6pm      fluticasone (FLONASE) 50 MCG/ACT nasal spray As needed      furosemide (LASIX) 40 MG tablet 2 x 40mg tab by mouth at 6am and 40mg tab by mouth at noon = 3/day 270 tablet 3    gabapentin (NEURONTIN) 600 MG tablet Take 1 tablet by mouth 3 times daily      HYDROcodone-acetaminophen (NORCO) 5-325 MG per tablet as needed      levETIRAcetam (KEPPRA) 500 MG tablet TAKE THREE TABLETS BY MOUTH EVERY MORNING AND FOUR TABLETS IN THE EVENING 217 tablet 11    lidocaine (LIDODERM) 5 % patch Place 1 patch onto the skin daily as needed for moderate pain Reported on 3/21/2017      magnesium 250 MG tablet Take 1 tablet (250 mg) by mouth 2 times daily      metFORMIN (GLUCOPHAGE-XR) 500 MG 24 hr tablet START WITH TAKE ONE TABLET BY MOUTH EVERY DAY WITH SUPPER FOR TWO WEEKS THEN TAKE TWO TABLETS BY MOUTH EVERY DAY WITH SUPPER      methadone (DOLOPHINE) 5 MG tablet 1-2 x 5mg tab by mouth nightly 1 tablet 0    MIRALAX 17 g packet MIX AND DRINK 17 GRAMS BY MOUTH OR NG-TUBE ONCE DAILY AS NEEDED      NONFORMULARY  Diabetic shoe      omeprazole (PRILOSEC) 20 MG DR capsule Take 20 mg by mouth daily      PARoxetine (PAXIL) 40 MG tablet Take 40 mg by mouth every morning      polyethylene glycol (MIRALAX) 17 GM/Dose powder Take 17 g by mouth daily as needed for constipation      potassium chloride ER (KLOR-CON M) 20 MEQ CR tablet 20meq tab by mouth twice daily      REFRESH OPTIVE ADVANCED 0.5-1-0.5 % SOLN PLACE ONE DROP INTO THE EYE(S) 5 TIMES DAILY AS NEEDED FOR DRY EYES      rOPINIRole (REQUIP) 1 MG tablet 1mg tab by mouth 4/day @ 6am, 12noon, 6pm, and 10-11pm 360 tablet 3    rotigotine (NEUPRO) 3 MG/24HR 24 hr patch Place 1 patch onto the skin daily 90 patch 3    simvastatin (ZOCOR) 40 MG tablet 40mg tab by mouth at bedtime      tamsulosin (FLOMAX) 0.4 MG capsule Take 1 capsule (0.4 mg) by mouth daily 90 capsule 1    triamcinolone (KENALOG) 0.1 % external cream As needed      valACYclovir (VALTREX) 1000 mg tablet Take 1 tablet (1,000 mg) by mouth daily as needed      ammonium lactate (LAC-HYDRIN) 12 % external lotion Apply topically to affected area(s) two times daily. (Patient not taking: Reported on 10/23/2024)      amoxicillin (AMOXIL) 500 MG capsule TAKE 4 CAPSULES BY MOUTH 1 HOUR PRIOR TO DENTAL APPOINTMENT (Patient not taking: Reported on 10/23/2024)      B-D U/F 31G X 8 MM insulin pen needle FOR ADMINISTERING B12 AT HOME (Patient not taking: Reported on 10/23/2024)      B-D U/F insulin pen needle USE AS DIRECTED WITH B12 (Patient not taking: Reported on 10/23/2024)      gabapentin (NEURONTIN) 300 MG capsule Take 300 mg by mouth 3 times daily (Patient not taking: Reported on 10/23/2024)      gabapentin (NEURONTIN) 800 MG tablet 800mg tab by mouth 3/day @ 6am, 12noon, 11pm (Patient not taking: Reported on 10/23/2024)      GAVILYTE-G 236 g suspension TAKE AS DIRECTED BY CLINIC PRIOR TO COLONOSCOPY PROCEDURE (Patient not taking: Reported on 10/23/2024)      ipratropium - albuterol 0.5 mg/2.5 mg/3 mL (DUONEB) 0.5-2.5 (3)  MG/3ML neb solution Take 1 vial by nebulization every 4 hours as needed (Patient not taking: Reported on 10/23/2024)      Ketoprofen 10 % CREA Externally apply 1 Tube topically 2 times daily (Patient not taking: Reported on 10/23/2024) 1 Tube 1    LORazepam (ATIVAN) 1 MG tablet 1mg tab by mouth nightly as needed (Patient not taking: Reported on 10/23/2024)      losartan (COZAAR) 25 MG tablet Take 1 tablet by mouth daily      Naftifine (NAFTIN) 2 % GEL Apply topically daily Reported on 3/21/2017 (Patient not taking: Reported on 10/23/2024)      NONFORMULARY Diabetic Shoe (Patient not taking: Reported on 10/23/2024)      PARoxetine (PAXIL) 10 MG tablet 10mg daily for 1 week then 20mg daily for 1 week then 30mg daily for 1 week then 40mg daily (Patient not taking: Reported on 10/23/2024) 120 tablet 11    UNABLE TO FIND Diabetic shoes DX: Diabetic Neuropathy (Patient not taking: Reported on 10/23/2024)      venlafaxine (EFFEXOR XR) 150 MG 24 hr capsule TAKE ONE CAPSULE BY MOUTH EVERY DAY WITH A MEAL      venlafaxine (EFFEXOR XR) 75 MG 24 hr capsule Take 75 mg by mouth daily       No current facility-administered medications for this visit.       Family History   Problem Relation Age of Onset    Dementia Mother         started in her 70s,  age 83 or 84    Other - See Comments Mother         moderate. lives with spouse    Other - See Comments Father         hearing decline, prostate cancer, memory loss, possible dementia    Prostate Cancer Father     Memory loss Father     Cerebrovascular Disease Father     Hearing Loss Father     Dementia Father     Other - See Comments Son     Multiple births Son     Multiple births Son     Brain Cancer Other         grand parent    Dementia Other     Other - See Comments Granddaughter     Other - See Comments Grandson         reports that he has never smoked. He has never used smokeless tobacco. He reports current alcohol use. He reports that he does not use drugs.    Review of  Systems:  Negative except continued swelling especially left, improved but not resolved.  Otherwise twelve system of review is negative.      OBJECTIVE:  Vitals:    10/23/24 0901   BP: 101/65   Pulse: 63   Resp: 16   SpO2: 94%     There is no height or weight on file to calculate BMI.    Status: doing well, having discomfort, and has discoloration    EXAM:  GENERAL: This is a well-developed 68 year old male who appears his stated age  HEAD: normocephalic  HEENT: Pupils equal and reactive bilaterally  CARDIAC: RRR without murmur  CHEST/LUNG:  Clear to auscultation  ABDOMEN: Soft, nontender, nondistended, no masses    NEUROLOGIC: Focally intact, nonfocal  VASCULAR: Pulses intact, symmetrical upper and lower extremities.  He has some spider bilaterally no real significant varicosities noted but still quite a bit of swelling right and left leg.                      Side:: Bilateral  VCSS  PAIN:: Moderate: Daily moderate activity limitation, occasional pain medication  Varicose Veins:: Mild: Few scattered  Venous Edema:: Moderate: Afternoon swelling, above ankle  Skin Pigmentation:: Mild: Diffuse, but limited in area and old (brown)  Inflamation:: Absent: None  Induration:: Absent: None  Number of active ulcers:: 0  Active ulcer duration:: None  Active ulcer diameter:: None  Compression Therapy:: Full compliance stockings + elevation  VCSS Score:: 9  CEAP:: A healed venous ulcer    LABS:  Lab Results   Component Value Date    WBC 8.2 09/07/2019    HGB 13.0 09/07/2019    HCT 41.1 09/07/2019     09/07/2019     09/08/2019     Lab Results   Component Value Date    ALT 37 09/05/2019    AST 22 09/13/2019    ALKPHOS 75 09/05/2019        Images:     Reviewed post closure us showing closed left greater saphenous vein    Exam Information    Exam Date Exam Time Accession # Performing Department Results    4/6/23  3:01 PM [id number] [facility]      PACS Images      Show images for US Venous Post Ablation Leg Left     Study Result    Narrative & Impression   Left Venous Ultrasound Status Post Radiofrequency Ablation (Date: 04/06/23)        Indication: Follow-up saphenous vein Radiofrequency ablation     Date of Procedure: Left 04/04/23      Left CFV/SFJ Compression:  Fully Compressible     Reference:   (FC)-Fully Compressible           (PC)-Partially Compressible   (NC) Non-Compressible     Location Left GSV   Proximal Thigh NC   Mid Thigh NC   Distal Thigh NC   Knee NC   Proximal Calf NC   Mid Calf NC   Distal Calf NC      Impression: Successful ablation of left leg greater saphenous vein from the proximal thigh to the distal calf.  No evidence of DVT at the left common femoral vein level.          Assessment/Plan:      Symptomatic varicose veins, left  Leg swelling  Lymphedema     Dyskinesia continued ongoing swelling issues even after closure.  I think the most of the components of the swelling receding now are from lymphedema and I think compression is helpful should continue that but I like to see swelling specialist Dr. Coy our clinic is specializes in lymphedema and swelling issues and will try to get him set up with her soon as possible.    No follow-ups on file.     Benjamin Salinas MD ,MD  [facility] Department of Surgery

## 2024-10-27 ENCOUNTER — HEALTH MAINTENANCE LETTER (OUTPATIENT)
Age: 69
End: 2024-10-27

## 2024-11-15 NOTE — TELEPHONE ENCOUNTER
Records Requested     November 15, 2024 8:41 AM   [id number]   Facility  [facility]   Outcome 8:43 am Sent request for imaging to be pushed to PACS. -JA     RECORDS RECEIVED FROM: Care Everywhere   REASON FOR VISIT: Dr. Owens transfer   PROVIDER: Lesia Simpson MD   DATE OF APPT: 12/13/24 @ 10:00 am    NOTES (FOR ALL VISITS) STATUS DETAILS   OFFICE NOTE from referring provider Internal 12/1/23, 1/20/23 Sandeep Owens MD @[facility]     OFFICE NOTE from other specialist Care Everywhere 6/14/24, 4/11/24, 10/5/23 Garry Ochoa MD @[facility]    5/17/23 Lelia Zuniga MD  @[facility]     DISCHARGE REPORT from the ER Care Everywhere 5/10/23-5/11/23 Mary Jane Puentes DO  @[email or web address]     EEG Internal [facility]  9/7/19 EEG   MEDICATION LIST Internal    IMAGING  (FOR ALL VISITS)     CT (HEAD, NECK, SPINE) In process Ed[facility]*  5/10/23 CT Cervical Spine  5/10/23 CT Head

## 2024-11-21 ENCOUNTER — TELEPHONE (OUTPATIENT)
Dept: VASCULAR SURGERY | Facility: CLINIC | Age: 69
End: 2024-11-21
Payer: COMMERCIAL

## 2024-11-21 NOTE — TELEPHONE ENCOUNTER
Caller: Regina, spouse    Provider: MD Benjamin Salinas    Detailed reason for call: Nils will be having surgery in early December for bilateral testicles/ureter. Dr. Salinas referred Nils to Dr. Coy for leg swelling. Consult is scheduled in February and on the wait list.    Nils consistently wears compressions daily. Regina would like to know if there is anything else he should be doing prior to surgery and seeing Dr. Coy.     Best phone number to contact: [phone]    Best time to contact: any    Ok to leave a detailed message: Yes

## 2024-11-21 NOTE — TELEPHONE ENCOUNTER
Call back to Nils, discussed elevation, exercise, watching sodium intake. Advised may also discuss with primary care provider to see if they would advise being seen by lymphedema therapy prior to his February appt with Dr. Coy.    Billie Kaufman RN, CWOCN

## 2024-12-13 ENCOUNTER — PRE VISIT (OUTPATIENT)
Dept: NEUROLOGY | Facility: CLINIC | Age: 69
End: 2024-12-13

## 2024-12-15 DIAGNOSIS — N40.0 BPH (BENIGN PROSTATIC HYPERPLASIA): ICD-10-CM

## 2024-12-15 DIAGNOSIS — G40.309: ICD-10-CM

## 2024-12-19 RX ORDER — TAMSULOSIN HYDROCHLORIDE 0.4 MG/1
0.4 CAPSULE ORAL DAILY
Qty: 90 CAPSULE | Refills: 1 | Status: SHIPPED | OUTPATIENT
Start: 2024-12-19

## 2024-12-19 RX ORDER — LEVETIRACETAM 500 MG/1
TABLET ORAL
Qty: 217 TABLET | Refills: 0 | Status: SHIPPED | OUTPATIENT
Start: 2024-12-19

## 2024-12-19 NOTE — TELEPHONE ENCOUNTER
Last Written Prescription Date:  5/29/24  Last Fill Quantity: 90,  # refills: 1   Last office visit provider:  5/9/23 with Dr. Ortega     Requested Prescriptions   Pending Prescriptions Disp Refills    tamsulosin (FLOMAX) 0.4 MG capsule 90 capsule 1     Sig: Take 1 capsule (0.4 mg) by mouth daily.       Alpha Blockers Failed - 12/19/2024 10:53 AM        Failed - Recent (12 mo) or future (90 days) visit within the authorizing provider's specialty     The patient must have completed an in-person or virtual visit within the past 12 months or has a future visit scheduled within the next 90 days with the authorizing provider s specialty.  Urgent care and e-visits do not qualify as an office visit for this protocol.          Passed - Most recent blood pressure under 140/90 in past 12 months     BP Readings from Last 3 Encounters:   10/23/24 101/65   06/14/24 127/80   12/01/23 (!) 152/88       No data recorded            Passed - Patient does not have Tadalafil, Vardenafil, or Sildenafil on their medication list        Passed - Medication is active on med list        Passed - Medication indicated for associated diagnosis     Medication is associated with one or more of the following diagnoses:  Benign prostatic hyperplasia  Disorder of the urinary system  Erectile dysfunction  Neurogenic bladder  Overactive bladder  Raynaud s  Ureteral stent-related symptoms  Urinary retention  Posttraumatic Stress Disorder  Lower urinary tract symptoms  Hypertensive disorder  Urinary frequency due to benign prostatic hypertrophy          Passed - Patient is 18 years of age or older             Indy Del Angel RN 12/19/24 10:53 AM

## 2024-12-19 NOTE — TELEPHONE ENCOUNTER
LCV:12/1/2023  [facility] [place] [facility]  NCV:12-31-24  [facility] protocol lab:11-7-24  CREATININE  0.70 - 1.20 mg/dL 1.20

## 2024-12-19 NOTE — TELEPHONE ENCOUNTER
" Centralized Medication Refill Team note:  Reviewed chart: not seen at [facility].  Dr Ortega visit per chart was at 5/9/2023  [facility] Note/ plan\" Plan:  Start tamsulosin  We will update results of PSA\"     Central Refill team does not manage/ process refills for [facility]. Routed request.      "

## 2024-12-28 ENCOUNTER — HEALTH MAINTENANCE LETTER (OUTPATIENT)
Age: 69
End: 2024-12-28

## 2024-12-31 ENCOUNTER — LAB REQUISITION (OUTPATIENT)
Dept: LAB | Facility: CLINIC | Age: 69
End: 2024-12-31
Payer: COMMERCIAL

## 2024-12-31 DIAGNOSIS — R60.0 LOCALIZED EDEMA: ICD-10-CM

## 2025-01-02 LAB
ANION GAP SERPL CALCULATED.3IONS-SCNC: 11 MMOL/L (ref 7–15)
BUN SERPL-MCNC: 19.1 MG/DL (ref 8–23)
CALCIUM SERPL-MCNC: 9.2 MG/DL (ref 8.8–10.4)
CHLORIDE SERPL-SCNC: 100 MMOL/L (ref 98–107)
CREAT SERPL-MCNC: 0.87 MG/DL (ref 0.67–1.17)
EGFRCR SERPLBLD CKD-EPI 2021: >90 ML/MIN/1.73M2
GLUCOSE SERPL-MCNC: 110 MG/DL (ref 70–99)
HCO3 SERPL-SCNC: 27 MMOL/L (ref 22–29)
POTASSIUM SERPL-SCNC: 4.8 MMOL/L (ref 3.4–5.3)
SODIUM SERPL-SCNC: 138 MMOL/L (ref 135–145)

## 2025-01-02 PROCEDURE — 80048 BASIC METABOLIC PNL TOTAL CA: CPT | Performed by: NURSE PRACTITIONER

## 2025-01-02 PROCEDURE — 36415 COLL VENOUS BLD VENIPUNCTURE: CPT | Performed by: NURSE PRACTITIONER

## 2025-01-02 PROCEDURE — P9603 ONE-WAY ALLOW PRORATED MILES: HCPCS | Performed by: NURSE PRACTITIONER

## 2025-01-14 ENCOUNTER — OFFICE VISIT (OUTPATIENT)
Dept: VASCULAR SURGERY | Facility: CLINIC | Age: 70
End: 2025-01-14
Attending: HOSPITALIST
Payer: COMMERCIAL

## 2025-01-14 VITALS
RESPIRATION RATE: 16 BRPM | SYSTOLIC BLOOD PRESSURE: 128 MMHG | HEIGHT: 68 IN | WEIGHT: 234.2 LBS | BODY MASS INDEX: 35.49 KG/M2 | HEART RATE: 62 BPM | DIASTOLIC BLOOD PRESSURE: 66 MMHG | TEMPERATURE: 97.5 F | OXYGEN SATURATION: 96 %

## 2025-01-14 DIAGNOSIS — I87.2 VENOUS STASIS DERMATITIS: ICD-10-CM

## 2025-01-14 DIAGNOSIS — I89.0 ACQUIRED LYMPHEDEMA OF LOWER EXTREMITY: Primary | ICD-10-CM

## 2025-01-14 DIAGNOSIS — I87.2 CHRONIC VENOUS INSUFFICIENCY OF LOWER EXTREMITY: ICD-10-CM

## 2025-01-14 PROCEDURE — G0463 HOSPITAL OUTPT CLINIC VISIT: HCPCS | Performed by: HOSPITALIST

## 2025-01-14 RX ORDER — TORSEMIDE 10 MG/1
10 TABLET ORAL
COMMUNITY
Start: 2024-10-28

## 2025-01-14 RX ORDER — POTASSIUM CHLORIDE 1500 MG/1
20 TABLET, EXTENDED RELEASE ORAL 2 TIMES DAILY WITH MEALS
COMMUNITY
Start: 2024-08-24

## 2025-01-14 RX ORDER — ALBUTEROL SULFATE 90 UG/1
1-2 INHALANT RESPIRATORY (INHALATION)
COMMUNITY
Start: 2024-12-10

## 2025-01-14 RX ORDER — METOPROLOL SUCCINATE 25 MG/1
1 TABLET, EXTENDED RELEASE ORAL DAILY
COMMUNITY
Start: 2025-01-09

## 2025-01-14 ASSESSMENT — PAIN SCALES - GENERAL: PAINLEVEL_OUTOF10: MODERATE PAIN (5)

## 2025-01-14 NOTE — PROGRESS NOTES
"VASCULAR MEDICINE CONSULT NOTE          LOCATION:  [facility]       Date of Service: 1/14/2025      Primary Care Provider: Lelia Galvez  Referring provider;  Benjamin Salinas      Reason for the visit/chief complaint:   Right lower extremity lymphedema      HPI:  Jose Loco is a pleasant 69 year old male who presents to our Vascular Medicine clinic for the above mentioned reason.  Mr. Loco is accompanied by his wife today who is helping in providing history.    Patient has complex medical history includes hypertension, type 2 diabetes mellitus, obesity status previous Curtis-en-Y 2006 current BMI 35, chronic venous insufficiency status post left GSV ablation on 4//2023 with Dr. Salinas, Parkinson disease, dementia, s/p permanent pacemaker due to symptomatic chronotropic incompetence 8/2023 and prior alcohol use disorder in remission.    At the time of his left GSV ablation April 2023, he had no reflux noted on his right lower extremity veins.  Therefore, was recommended to continue medical management.  Due to ongoing swelling with concern for acquired lymphedema, he was referred to our clinic by Dr. Salinas.    In the interim, Mr. Loco did have a recent bilateral inguinal hernia repair that was elective on 12/20/2024.  He was discharged to TCU for 2 weeks.  Per his wife, his right lower extremity edema got significantly worse during the postoperative phase and during his TCU stay.  He was getting his lower extremities wrapped with short stretch wraps and this gradually improved the swelling.  He currently wears over-the-counter compression stockings that they believe it is 20-30 mmHg but not very \" tight\".  He does not have a great activity overall and does remain somewhat with sedentary lifestyle.  He is able to walk but any prolonged distance such as grocery shopping, he is typically riding a scooter.  Per wife, does have big issue with his balance and right foot " "deformity however, it was not felt to be a good candidate for corrective surgery with protracted recovery expected.  He does sleep on the couch because \" it is more comfortable\".  Spent significant amount of time sitting on his recliner during the day.  Finds leg elevation above the level of the heart uncomfortable.    Denies DVT or superficial vein thrombosis.  Reports prior history of cellulitis but believes to the left lower extremity instead.  Denies wounds.  Denies surgeries to lower extremities.      REVIEW OF SYSTEMS:    A 12 point ROS was reviewed and is negative except what is mentioned in HPI.       Past medical history, surgical history, medications, family history, social history and allergies were reviewed. Pertinent points mentioned under HPI.        OBJECTIVE:    Vital signs:  /66   Pulse 62   Temp 97.5  F (36.4  C)   Resp 16   Ht 5' 8\" (1.727 m)   Wt 234 lb 3.2 oz (106.2 kg)   SpO2 96%   BMI 35.61 kg/m    Wt Readings from Last 1 Encounters:   01/14/25 234 lb 3.2 oz (106.2 kg)     Body mass index is 35.61 kg/m .    Physical exam:  General appearance: Pleasant male in no apparent distress.    HEENT: NC/AT.    Neck: Carotids +2/2 bilaterally without bruits.  No jugular venous distension.   Heart: RRR. Normal S1, S2.   Chest: Clear to auscultation bilaterally.  Abdomen: Obese, soft.  Extremities and skin: Bilateral lower extremities with pitting edema more noted on the right.  No tenderness.  Positive Stemmer sign on the right, negative on the left.  This is dermatitis noted bilaterally.  No skin wounds.  Palpable DP and PT easily on the left on the right, DP 1/2 and PT 0/2.  Neurological: Alert, awake and oriented           DIAGNOSTIC STUDIES:   Labs and diagnostics reviewed including outside records. Pertinent points are mentioned under HPI and assessment and plan sections.      ASSESSMENT AND PLAN:    Chronic venous insufficiency status post left GSV ablation//2023  Acquired lymphedema of " the right lower extremity  Class II obesity BMI 35 with prior history of Curtis-en-Y 2006  Right foot deformity with pes planovalgus  Sedentary lifestyle       had recent worsening of his right lower extremity after his inguinal hernia surgery that responded nicely to compression wraps initially.  He did have prior GSV ablation of the left lower extremity and this although does swell sometimes, the swelling remains under good control with compression stocking use.  Additionally, denies any symptoms of the left.  The right lower extremity however has been the most symptomatic limb as of late with swelling, heaviness and achiness that is worse by the end of the day and with prolonged sitting or standing.  We discussed that he does have evidence of venous stasis on the right lower extremity with no incompetent valves noted on his exam in 2023.  Therefore, we recommend ongoing conservative management.  Discussed this includes compression therapy, leg elevation, increasing activity and exercise.  We spent time discussing effective compression and we will give him a prescription for knee-high 20-30 mmHg compression stockings.  We also discussed the importance of leg elevation, calf muscle pump exercises and attempts of weight loss specifically abdominal.      Recommendations:  Prescription for medical graduated knee-high compression stocking provided today 20-30 mmHg.  Encouraged wearing compression stockings daily in the morning, remove at bedtime.  Encouraged leg elevation above the level of the heart for at least 30 minutes, 2-3 times a day.  Encouraged increasing activity and exercising.  Encouraged weight loss.  Follow-up in our clinic in a few months to assess improvement.    Was a pleasure meeting  and his wife in our clinic today.        Alek Coy MD, [facility]  Vascular Medicine  January 14, 2025

## 2025-01-14 NOTE — PROGRESS NOTES
"[facility] Vascular Clinic        Patient is here for a consult to discuss BLE swelling, R>L. The patient states he/she does  wear compressions, however states he needs new ones. Swelling is observed in both lower extremities.    Pt is currently taking Aspirin and Statin.    /66   Pulse 62   Temp 97.5  F (36.4  C)   Resp 16   Ht 5' 8\" (1.727 m)   Wt 234 lb 3.2 oz (106.2 kg)   SpO2 96%   BMI 35.61 kg/m      The provider has been notified that the patient has concerns of BLE swelling.     Questions patient would like addressed today are: N/A.    Refills are needed: N/A    Has homecare services and agency name:  No           "

## 2025-01-14 NOTE — PATIENT INSTRUCTIONS
Issac Garcia,    Thank you for entrusting your care with us today. After your visit today with MD Alek Coy this is the plan that was discussed at your appointment.    Wear graduated compression stockings 20-30mmHg daily.  See below for more information on use.     Follow up as scheduled.         I am including additional information on these things and our contact information if you have any questions or concerns.   Please do not hesitate to reach out to us if you felt we did not answer your questions or you are unsure of the treatment plan after your visit today. Our number is [phone].Thank you for trusting us with your care.         Again thank you for your time.     ===========================================================    Your provider has recommended you start wearing compression stockings. You may get these at any [employer] supply store. You may also purchase stockings online through outside vendors. If you order online, be sure you are purchasing the correct compression stockings based on the prescription from your doctor. If you are unsure what to order, please call our clinic at [phone] and ask to speak with a nurse or send us a [employer] message.    Online vendors  Prosodic: www.Proofpoint: [employer]  Sigvaris: [email or web address]    ===========================================================  Learning About Using Compression Stockings  Compression stockings help prevent blood and fluid from pooling in the legs. They may be used for problems like varicose veins, skin ulcers, and deep vein thrombosis (blood clot in the leg). There are different types of stockings, and they need to fit right. Your doctor will recommend what you need.  5 tips for putting on compression stockings    If your stockings are new, wash them in cold water.  This can make them easier to put on.     Put on your stockings early in the morning, if you can.  This is when you have  "the least swelling in your legs.     Wear them every day while you're awake, especially while you're on your feet.       Try putting silicone lotion or cornstarch on your legs.  This can make it easier to slide the stockings on.     To help your , try using rubber gloves.  Ask your doctor about other tools to help if it's hard to put on the stockings.   How to put on compression stockings  It can be a little tricky to put on compression stockings at first. But after you practice a few times, it may get easier. Here are the details.    Sit on a firm surface where your feet can touch the ground.   Hold the top of the stocking with one hand. Then with your other hand, reach in and grab the heel.     When you have a firm  on the heel, pull your hand back up through the stocking, turning it inside out as far as the heel.   Put your toes in as far as they will go. Then center your heel in the stocking and pull it up slightly, just around your heel.     Use both hands to grasp the folded part of the stocking about 2 inches below the fold. Pull that section up over your ankle.   Next, from above your ankle, grasp the folded part of the stocking about 2 inches below the fold. Pull that section up.     Continue pulling the stocking up in short sections until it is in its final position. The final position may be below your knee. Or it might be above your knee.   Run your hands over the stocking to smooth it out.   Where can you learn more?  Go to https://www.Blowtorch.net/patiented  Enter J166 in the search box to learn more about \"Learning About Using Compression Stockings.\"  Current as of: December 19, 2022               Content Version: 13.7    8471-7144 SigFig.   Care instructions adapted under license by your healthcare professional. If you have questions about a medical condition or this instruction, always ask your healthcare professional. [employer] disclaims any warranty or " liability for your use of this information.

## 2025-02-13 DIAGNOSIS — G40.309: ICD-10-CM

## 2025-02-19 RX ORDER — LEVETIRACETAM 500 MG/1
TABLET ORAL
Qty: 124 TABLET | Refills: 1 | Status: SHIPPED | OUTPATIENT
Start: 2025-02-19

## 2025-02-19 NOTE — TELEPHONE ENCOUNTER
LEVETIRACETAM 500MG TABS   Last Written Prescription:  12/19/24  #217,0 refills   ----------------------  Last Visit Date:  12/1/23  Future Visit Date: None  ----------------------      [x]  Refill decision: Medication unable to be refilled by RN due to: Pt not seen within past 12 months, No FOV or FOV exceeds timeframe per protocol    30 day jemima with last refill        Request from pharmacy:  Requested Prescriptions   Pending Prescriptions Disp Refills    levETIRAcetam (KEPPRA) 500 MG tablet [Pharmacy Med Name: LEVETIRACETAM 500MG TABS] 217 tablet 0     Sig: TAKE THREE TABLETS BY MOUTH IN THE MORNING AND FOUR TABLETS IN THE EVENING       There is no refill protocol information for this order

## 2025-03-18 ENCOUNTER — OFFICE VISIT (OUTPATIENT)
Dept: NEUROLOGY | Facility: CLINIC | Age: 70
End: 2025-03-18
Payer: COMMERCIAL

## 2025-03-18 ENCOUNTER — LAB (OUTPATIENT)
Dept: LAB | Facility: CLINIC | Age: 70
End: 2025-03-18
Payer: COMMERCIAL

## 2025-03-18 VITALS
OXYGEN SATURATION: 94 % | RESPIRATION RATE: 16 BRPM | HEART RATE: 77 BPM | DIASTOLIC BLOOD PRESSURE: 72 MMHG | SYSTOLIC BLOOD PRESSURE: 107 MMHG

## 2025-03-18 DIAGNOSIS — G40.309: Primary | ICD-10-CM

## 2025-03-18 DIAGNOSIS — F02.818 DEMENTIA DUE TO MEDICAL CONDITION WITH BEHAVIORAL DISTURBANCE (H): ICD-10-CM

## 2025-03-18 DIAGNOSIS — G40.309: ICD-10-CM

## 2025-03-18 PROCEDURE — 3074F SYST BP LT 130 MM HG: CPT | Performed by: INTERNAL MEDICINE

## 2025-03-18 PROCEDURE — 36415 COLL VENOUS BLD VENIPUNCTURE: CPT | Performed by: PATHOLOGY

## 2025-03-18 PROCEDURE — 80177 DRUG SCRN QUAN LEVETIRACETAM: CPT | Performed by: INTERNAL MEDICINE

## 2025-03-18 PROCEDURE — 1125F AMNT PAIN NOTED PAIN PRSNT: CPT | Performed by: INTERNAL MEDICINE

## 2025-03-18 PROCEDURE — 3078F DIAST BP <80 MM HG: CPT | Performed by: INTERNAL MEDICINE

## 2025-03-18 PROCEDURE — 99213 OFFICE O/P EST LOW 20 MIN: CPT | Performed by: INTERNAL MEDICINE

## 2025-03-18 PROCEDURE — 99000 SPECIMEN HANDLING OFFICE-LAB: CPT | Performed by: PATHOLOGY

## 2025-03-18 RX ORDER — LEVETIRACETAM 500 MG/1
TABLET ORAL
Qty: 630 TABLET | Refills: 3 | Status: SHIPPED | OUTPATIENT
Start: 2025-03-18 | End: 2026-03-20

## 2025-03-18 ASSESSMENT — PAIN SCALES - GENERAL: PAINLEVEL_OUTOF10: MODERATE PAIN (6)

## 2025-03-18 NOTE — Clinical Note
3/18/2025       RE: Jose Loco  [address] [address]     Dear Colleague,    Thank you for referring your patient, Jose Loco, to the [facility]. Please see a copy of my visit note below.    [facility] Epilepsy Care Progress Note    Patient:  Jose Loco  :  1955   Age:  69 year old   Today's Office Visit:  3/18/2025    Interval History  They report he is doing very well and there has been no generalized seizures for over a year. His wife reports he may be having some staring spells which she is unable to get him out of by calling his name. They typically last 10 seconds. They are unsure if this is related to his dementia or seizures. He has not had any injuries from this.  His wife reports worsening memory problems and more aggressiveness after   Epilepsy Data:  Mr. Loco is a 69 year old gentleman with generalized epilepsy.               Impression  Mr. Loco is a 69 year old with likely generalized epilepsy seems to be well controlled       Again, thank you for allowing me to participate in the care of your patient.      Sincerely,    Lesia Simpson MD

## 2025-03-18 NOTE — NURSING NOTE
Chief Complaint   Patient presents with    New Patient     New seizure      /72 (BP Location: Right arm, Patient Position: Sitting, Cuff Size: Adult Regular)   Pulse 77   Resp 16   SpO2 94%       Alcira Cisse

## 2025-03-18 NOTE — PROGRESS NOTES
[facility] Epilepsy Care Progress Note    Patient:  Jose Loco  :  1955   Age:  69 year old   Today's Office Visit:  3/18/2025    Interval History  They report he is doing very well and there has been no generalized seizures for over a year. His wife reports he may be having some staring spells which she is unable to get him out of by calling his name. They typically last 10 seconds. They are unsure if this is related to his dementia or seizures. He has not had any injuries from this.  His wife reports worsening memory problems and more aggressiveness after   Epilepsy Data:  Mr. Loco is a 69 year old gentleman with generalized epilepsy.               Impression  Mr. Loco is a 69 year old with likely generalized epilepsy seems to be well controlled    currently lives with his wife. They get help occasionally from the family. Wife notes he is increasingly hard to handle.     Impression  Mr. Loco is a 69 year old with likely generalized epilepsy, generalized seizures seem to be well controlled on LEV. However his wife reports the possibility of staring spells which may or may not be absence seizure's.  She also reports worsening memory problems and aggressiveness. I discussed that these are common complications of dementia and may be best to discuss with the mental health provider to mitigate.  Also discussed the fact that levetiracetam does have neuropsychiatric side effects including aggressiveness however these of levetiracetam seems to predate the development of aggressiveness.   Ms. Loco also seem to be very exhausted from caregiver burden, she reports that she does not feel like she is getting enough support from the family.  I encouraged her to prioritize her health until, consider speaking to a therapist as optimizing self idiopathic bariatric bowels.      Plan  psychiatric consult for management of neuropsychiatric issues and cognitive neurology consult  Levetiracetam levels  Take levetiracetam 1500 mg in the a.m. and 2000 mg in the p.m.    I spent 21  minutes in total today to provide comprehensive  medical care.      The rest of the time was spent with the patient in face to face interview. During this time key medical decisions were made with review of medical chart prior to visit, visit with patient, counseling/education, and post visit work, including documentation of note on the day of visit. I also personally reviewed prior investigations - laboratory and imaging related to the patients epilepsy care.  I addressed all questions the patient/caregiver raised in regards to epilepsy or related medical questions.

## 2025-03-19 LAB — LEVETIRACETAM SERPL-MCNC: 78.8 ÂΜG/ML (ref 10–40)

## 2025-04-15 ENCOUNTER — OFFICE VISIT (OUTPATIENT)
Dept: VASCULAR SURGERY | Facility: CLINIC | Age: 70
End: 2025-04-15
Attending: HOSPITALIST
Payer: COMMERCIAL

## 2025-04-15 VITALS
SYSTOLIC BLOOD PRESSURE: 121 MMHG | DIASTOLIC BLOOD PRESSURE: 77 MMHG | HEART RATE: 70 BPM | TEMPERATURE: 97.6 F | OXYGEN SATURATION: 92 % | RESPIRATION RATE: 18 BRPM

## 2025-04-15 DIAGNOSIS — I89.0 ACQUIRED LYMPHEDEMA OF LOWER EXTREMITY: ICD-10-CM

## 2025-04-15 DIAGNOSIS — L97.929 VENOUS ULCER OF LEFT LEG (H): Primary | ICD-10-CM

## 2025-04-15 DIAGNOSIS — I83.029 VENOUS ULCER OF LEFT LEG (H): Primary | ICD-10-CM

## 2025-04-15 DIAGNOSIS — I87.2 CHRONIC VENOUS INSUFFICIENCY OF LOWER EXTREMITY: ICD-10-CM

## 2025-04-15 PROCEDURE — 3074F SYST BP LT 130 MM HG: CPT | Performed by: HOSPITALIST

## 2025-04-15 PROCEDURE — G2211 COMPLEX E/M VISIT ADD ON: HCPCS | Performed by: HOSPITALIST

## 2025-04-15 PROCEDURE — 99214 OFFICE O/P EST MOD 30 MIN: CPT | Performed by: HOSPITALIST

## 2025-04-15 PROCEDURE — 1125F AMNT PAIN NOTED PAIN PRSNT: CPT | Performed by: HOSPITALIST

## 2025-04-15 PROCEDURE — 3078F DIAST BP <80 MM HG: CPT | Performed by: HOSPITALIST

## 2025-04-15 PROCEDURE — G0463 HOSPITAL OUTPT CLINIC VISIT: HCPCS | Performed by: HOSPITALIST

## 2025-04-15 RX ORDER — TIRZEPATIDE 2.5 MG/.5ML
INJECTION, SOLUTION SUBCUTANEOUS
COMMUNITY

## 2025-04-15 RX ORDER — ROSUVASTATIN CALCIUM 40 MG/1
40 TABLET, COATED ORAL
COMMUNITY
Start: 2025-04-08

## 2025-04-15 ASSESSMENT — PAIN SCALES - GENERAL: PAINLEVEL_OUTOF10: SEVERE PAIN (7)

## 2025-04-15 NOTE — PROGRESS NOTES
[facility] Vascular Clinic        Patient is here for a 3 month follow up  to discuss BLE swelling. The patient states he/she does  wear compressions. Swelling is observed in both lower extremities, R>L.    Pt is currently taking Aspirin and Statin.    /77   Pulse 70   Temp 97.6  F (36.4  C)   Resp 18   SpO2 92%     The provider has been notified that the patient has no concerns.     Questions patient would like addressed today are: N/A.    Refills are needed: No    Has homecare services and agency name:  [facility]

## 2025-04-15 NOTE — PATIENT INSTRUCTIONS
Issac Garcia,    Thank you for entrusting your care with us today. After your visit today with Dr. Alek Coy this is the plan that was discussed at your appointment.    For left shin wound, ok to cleanse with soap and water.  Cover with a silicone bordered foam dressing.  Do not use band-aids.  If this is not making progress towards healing in the next 1-2 weeks, please call vascular clinic to be seen by a wound provider.      Ensure you are elevating periodically throughout the day.  This is especially important to do when you have an open wound.     Follow up with your podiatrist for your feet.  If you need to establish care with podiatry through [facility], you can call the vascular clinic to schedule.     Go to lymphedema therapy.  If they do not call you in the next couple of business days, please call them to schedule.     Continue to wear graduated compression stockings 20-30mmHg daily until you are seen by lymphedema therapy.  See below for more information on use.     Follow up in 3 months with Dr. Coy.     I am including additional information on these things and our contact information if you have any questions or concerns.   Please do not hesitate to reach out to us if you felt we did not answer your questions or you are unsure of the treatment plan after your visit today. Our number is [phone].Thank you for trusting us with your care.         Again thank you for your time.     Wear your compression stockings every day; put them on first thing in the morning and remove at bedtime    Replace your compression stockings every 6 months; these garments will lose their elasticity and become ineffective    Hang your stockings, do not put them in the dryer.  This will help prolong the life of your compressions stockings.     Elevate your legs periodically throughout the day, 30-60 minutes 1-3 times per day    Do calf pumping exercises periodically throughout the day.         ===========================================================    Your provider has recommended you start wearing compression stockings. You may get these at any [facility] supply store. You may also purchase stockings online through outside vendors. If you order online, be sure you are purchasing the correct compression stockings based on the prescription from your doctor. If you are unsure what to order, please call our clinic at [phone] and ask to speak with a nurse or send us a [employer] message.    Online vendors  Dexetra: www.ADIKTIVO: Cardiosonic  Sigvaris: Cartela [email or web address].[email or web address] Technologies    ===========================================================  Learning About Using Compression Stockings  Compression stockings help prevent blood and fluid from pooling in the legs. They may be used for problems like varicose veins, skin ulcers, and deep vein thrombosis (blood clot in the leg). There are different types of stockings, and they need to fit right. Your doctor will recommend what you need.  5 tips for putting on compression stockings    If your stockings are new, wash them in cold water.  This can make them easier to put on.     Put on your stockings early in the morning, if you can.  This is when you have the least swelling in your legs.     Wear them every day while you're awake, especially while you're on your feet.       Try putting silicone lotion or cornstarch on your legs.  This can make it easier to slide the stockings on.     To help your , try using rubber gloves.  Ask your doctor about other tools to help if it's hard to put on the stockings.   How to put on compression stockings  It can be a little tricky to put on compression stockings at first. But after you practice a few times, it may get easier. Here are the details.    Sit on a firm surface where your feet can touch the ground.   Hold the top of the stocking with one hand. Then with your other hand,  "reach in and grab the heel.     When you have a firm  on the heel, pull your hand back up through the stocking, turning it inside out as far as the heel.   Put your toes in as far as they will go. Then center your heel in the stocking and pull it up slightly, just around your heel.     Use both hands to grasp the folded part of the stocking about 2 inches below the fold. Pull that section up over your ankle.   Next, from above your ankle, grasp the folded part of the stocking about 2 inches below the fold. Pull that section up.     Continue pulling the stocking up in short sections until it is in its final position. The final position may be below your knee. Or it might be above your knee.   Run your hands over the stocking to smooth it out.   Where can you learn more?  Go to https://www.Grafoid.net/patiented  Enter J166 in the search box to learn more about \"Learning About Using Compression Stockings.\"  Current as of: December 19, 2022               Content Version: 13.7    6635-5752 [employer]ic.   Care instructions adapted under license by your healthcare professional. If you have questions about a medical condition or this instruction, always ask your healthcare professional. Global[employer]ic disclaims any warranty or liability for your use of this information.      Compression Stocking Tricks for Application and Removal    Purchase ALPS Prosthetic Fitting Lotion  Apply pea-sized amount to each leg prior to donning stockings  https://www.SurgiQuest//products/juzo-alps-fitting-lotion          Donning Devices                      Garden Gloves               "

## 2025-04-21 ENCOUNTER — OFFICE VISIT (OUTPATIENT)
Dept: URGENT CARE | Facility: URGENT CARE | Age: 70
End: 2025-04-21
Payer: COMMERCIAL

## 2025-04-21 VITALS
WEIGHT: 230 LBS | HEART RATE: 88 BPM | BODY MASS INDEX: 34.86 KG/M2 | TEMPERATURE: 97.3 F | HEIGHT: 68 IN | RESPIRATION RATE: 18 BRPM | SYSTOLIC BLOOD PRESSURE: 112 MMHG | DIASTOLIC BLOOD PRESSURE: 80 MMHG | OXYGEN SATURATION: 95 %

## 2025-04-21 DIAGNOSIS — J02.9 SORE THROAT: ICD-10-CM

## 2025-04-21 DIAGNOSIS — J03.90 TONSILLITIS: Primary | ICD-10-CM

## 2025-04-21 LAB — DEPRECATED S PYO AG THROAT QL EIA: NEGATIVE

## 2025-04-21 PROCEDURE — 99203 OFFICE O/P NEW LOW 30 MIN: CPT | Performed by: PHYSICIAN ASSISTANT

## 2025-04-21 PROCEDURE — 3079F DIAST BP 80-89 MM HG: CPT | Performed by: PHYSICIAN ASSISTANT

## 2025-04-21 PROCEDURE — 87651 STREP A DNA AMP PROBE: CPT | Performed by: PHYSICIAN ASSISTANT

## 2025-04-21 PROCEDURE — 3074F SYST BP LT 130 MM HG: CPT | Performed by: PHYSICIAN ASSISTANT

## 2025-04-21 RX ORDER — ALBUTEROL SULFATE 90 UG/1
2 INHALANT RESPIRATORY (INHALATION) EVERY 6 HOURS PRN
Qty: 18 G | Refills: 0 | Status: SHIPPED | OUTPATIENT
Start: 2025-04-21

## 2025-04-21 RX ORDER — BENZONATATE 200 MG/1
200 CAPSULE ORAL 3 TIMES DAILY PRN
Qty: 30 CAPSULE | Refills: 0 | Status: SHIPPED | OUTPATIENT
Start: 2025-04-21 | End: 2025-05-01

## 2025-04-22 LAB — S PYO DNA THROAT QL NAA+PROBE: NOT DETECTED

## 2025-04-22 NOTE — PROGRESS NOTES
Sore throat  - Streptococcus A Rapid Screen w/Reflex to PCR - Clinic Collect  - albuterol (PROAIR HFA/PROVENTIL HFA/VENTOLIN HFA) 108 (90 Base) MCG/ACT inhaler; Inhale 2 puffs into the lungs every 6 hours as needed for shortness of breath, wheezing or cough.  - benzonatate (TESSALON) 200 MG capsule; Take 1 capsule (200 mg) by mouth 3 times daily as needed for cough.    Tonsillitis  - amoxicillin-clavulanate (AUGMENTIN) 875-125 MG tablet; Take 1 tablet by mouth 2 times daily for 10 days.  - albuterol (PROAIR HFA/PROVENTIL HFA/VENTOLIN HFA) 108 (90 Base) MCG/ACT inhaler; Inhale 2 puffs into the lungs every 6 hours as needed for shortness of breath, wheezing or cough.  - benzonatate (TESSALON) 200 MG capsule; Take 1 capsule (200 mg) by mouth 3 times daily as needed for cough.      General Tips for All Ages:    Rest and Hydration:  Allow yourself the time to rest and prioritize hydration.  Stay well-hydrated with water, clear broths, and soothing herbal teas.    Symptomatic Relief:  Over-the-counter (OTC) medications can help alleviate symptoms.  Consider non-pharmacological options like a warm saltwater gargle for soothing a sore throat.    For Infants and Children (Under 2 Years):    Nasal Saline Drops:  Use saline drops to clear nasal congestion in infants.  Administer 1-2 drops in each nostril before feeding or bedtime.    Humidifier:  Place a cool-mist humidifier in the room to ease congestion.  Remember to clean the humidifier regularly.  Okay to use [employer]     Acetaminophen (if applicable):  Use acetaminophen for fever and discomfort.  Follow dosing guidelines based on your child's weight.  Avoid aspirin in children to prevent Reye's syndrome.    Contact Pediatrician:  If symptoms persist or worsen, or if your child shows signs of respiratory distress, contact your pediatrician.    For Children (2-12 Years):    Nasal Saline Solution:  Encourage the use of saline nasal spray for congestion relief.  Teach your  child to blow their nose gently.    Honey (for those over 1 year):  Use honey to soothe a cough (1-2 teaspoons as needed).  Do not give honey to children under 1 year due to the risk of botulism.    Acetaminophen or Ibuprofen:  Use acetaminophen or ibuprofen for fever and pain relief.  Follow dosing guidelines based on your child's weight.    Fluid Intake:  Ensure your child drinks warm liquids like herbal teas and broths.  Monitor their fluid intake to keep them hydrated.    For Adolescents and Adults (12 Years and Older):    Decongestants (Pseudoephedrine/Phenylephrine):  Consider OTC decongestants for nasal congestion.  Use with caution if you have hypertension, and avoid prolonged use.    Cough Suppressants (Dextromethorphan):  Choose a cough suppressant for persistent cough.  Avoid in children under 12 years; use honey instead.  Follow package instructions and avoid multiple medications with similar ingredients.    Pain Relievers (Acetaminophen or Ibuprofen):  Use acetaminophen or ibuprofen for pain and fever.  Follow package instructions.  Take ibuprofen with food to minimize stomach upset.    Rest and Isolation:  Prioritize rest and stay at home to prevent spreading the infection.    For All Ages:    Hydration:  Ensure you stay well-hydrated; monitor urine color to gauge hydration.    Hand Hygiene:  Wash hands with soap and water for at least 20 seconds.  Use hand  with at least 60% alcohol if soap is unavailable.    Avoid Tobacco Smoke:  Stay away from tobacco smoke, which can worsen respiratory symptoms.    Seek Medical Attention:  If symptoms worsen or if you experience difficulty breathing, seek medical attention promptly.  Look out for red flag symptoms like persistent high fever, severe headache, or chest pain.    Patient advised to return to clinic for reevaluation (either UC or PCP) if symptoms do not improve in 7 days. Patient educated on red flag symptoms and asked to go directly to the ED  if these symptoms present themselves.     Remember, this guide is meant to assist you, but individual cases may vary. If you have concerns or if symptoms persist, don't hesitate to reach out to a healthcare professional. Wishing you a speedy recovery!      Stanton Nicole PA-C  [facility]    Subjective   69 year old who presents to clinic today for the following health issues:    Urgent Care       HPI     Acute Illness  Acute illness concerns: Coughing x 1 week, yellow mucus, sore throat, watery eyes, headache and sneezing.  Onset/Duration: 1 week  Symptoms:  Fever: No  Chills/Sweats: No  Headache (location?): YES  Sinus Pressure: No  Conjunctivitis:  No  Ear Pain: no  Rhinorrhea: YES  Congestion: YES  Sore Throat: YES 4/10  Cough: YES  Wheeze: YES- Patient has a history of bronchitis   Decreased Appetite: No  Nausea: No  Vomiting: No  Diarrhea: No  Dysuria/Freq.: No  Dysuria or Hematuria: No  Fatigue/Achiness: YES  Sick/Strep Exposure: None known   Therapies tried and outcome: Tylenol     Patient has diabetes and Parkinson's/dementia    Review of Systems   Review of Systems   See HPI    Objective    Temp: 97.3  F (36.3  C) Temp src: Oral BP: 112/80 Pulse: 88   Resp: 18 SpO2: 95 %       Physical Exam   Physical Exam  Constitutional:       General: He is not in acute distress.     Appearance: Normal appearance. He is normal weight. He is not ill-appearing, toxic-appearing or diaphoretic.   HENT:      Head: Normocephalic and atraumatic.      Right Ear: Tympanic membrane, ear canal and external ear normal. There is no impacted cerumen.      Left Ear: Tympanic membrane, ear canal and external ear normal. There is no impacted cerumen.      Nose: Congestion present. No rhinorrhea.      Mouth/Throat:      Pharynx: Oropharyngeal exudate and posterior oropharyngeal erythema present.   Cardiovascular:      Rate and Rhythm: Normal rate and regular rhythm.      Pulses: Normal pulses.      Heart sounds:  Normal heart sounds. No murmur heard.     No friction rub. No gallop.   Pulmonary:      Effort: Pulmonary effort is normal. No respiratory distress.      Breath sounds: Normal breath sounds. No stridor. No wheezing, rhonchi or rales.   Chest:      Chest wall: No tenderness.   Lymphadenopathy:      Cervical: Cervical adenopathy present.   Neurological:      Mental Status: He is alert.   Psychiatric:         Mood and Affect: Mood normal.         Behavior: Behavior normal.         Thought Content: Thought content normal.         Judgment: Judgment normal.          No results found for this or any previous visit (from the past 24 hours).

## 2025-04-22 NOTE — PROGRESS NOTES
Urgent Care Clinic Visit    Chief Complaint   Patient presents with    Urgent Care     Coughing x 1 week, yellow mucus, sore throat, watery eyes, headache and sneezing.                4/21/2025     7:47 PM   Additional Questions   Roomed by CRISTIAN Verdugo   Accompanied by wife Regina

## 2025-04-27 NOTE — PROGRESS NOTES
VASCULAR MEDICINE PROGRESS NOTE          LOCATION:  [facility]       Date of Service: 4/15/2025      Primary Care Provider: Lelia Galvez      Reason for the visit/chief complaint:   Follow up on right lower extremity lymphedema      Subjective:  Jose Loco is a pleasant 69 year old male who presents to our Vascular Medicine clinic to follow up.    I first met with Mr. Loco 3 months ago 1/14/2025.  I refer the reader to my initial consultation note for details.    Patient reports that he has been very compliant with wearing knee-high medical graduated 20 to 30 mmHg compression stockings.  Despite this, he did not see any improvement.  Recently started having a small opening on his left shin that has been draining minimally.  No redness or warmth.  No fever or chills.  No foul-smelling.  He has been only covering it with a Band-Aid.  He remains sedentary.  Otherwise no major change in his health over the past 3 months.    Compliant with his torsemide 10 mg twice daily.  Denies increased shortness of breath.      Past medical history, surgical history, medications, family history, social history and allergies were reviewed. Pertinent points mentioned under HPI.        OBJECTIVE:    Vital signs:  /77   Pulse 70   Temp 97.6  F (36.4  C)   Resp 18   SpO2 92%   Wt Readings from Last 1 Encounters:   04/21/25 230 lb (104.3 kg)     There is no height or weight on file to calculate BMI.      Physical exam:  General appearance: Pleasant male in no apparent distress.    Extremities and skin: Bilateral lower extremities with pitting edema more noted on the right.  No tenderness.  Positive Stemmer sign on the right, negative on the left.  CC dermatitis noted bilaterally with small right mid shin/anterior calf venous ulcer.  No active drainage.  Minimally tender around the wound.  No increased warmth. Palpable DP and PT easily on the left on the right, DP 1/2 and PT  0/2.  Neurological: Alert, awake and oriented           Left Taylor:         1/14/2025  1:45 PM 4/15/2025  1:39 PM   Circumferential Measures     Right just above MTP 24.7  24.3    Right Ankle 25.2  26.2    Right Widest Calf 42.5  42.7    Left - just above MTP 24.1  24.2    Left Ankle 23.8  25.6    Left Widest Calf 39.5  41.4            ASSESSMENT AND PLAN:    Left anterior calf/shin small venous ulcer  Chronic venous insufficiency status post left GSV ablation//2023  Acquired lymphedema of the right lower extremity  Class II obesity BMI 35 with prior history of Curtis-en-Y 2006  Right foot deformity with pes planovalgus  Sedentary lifestyle    Swelling is not under good control despite reported compliance with compression stockings.  He has not been necessarily active or elevating his lower extremities as discussed.  Now he has a small venous ulcer on his left shin.  Anticipate improvement with more compression.  We will refer him to lymphedema therapy for CDT initiation.  Meanwhile, discussed continuing with his current compression stockings but cover the wound with silicone bordered foam dressing.  If does not heal up completely within 1-2 weeks, he was instructed to call our clinic to be seen by our wound clinic.  I anticipate with more compression, this small wound will heal faster.    Recommendations:  Apply silicone bordered foam dressing to the left shin wound.  Referral to lymphedema therapy for worsening swelling to initiate CDT.  Continue with current compression stockings 20 to 30 mmHg, knee-high until seen by lymphedema therapy.  Call our clinic if no healing in the next 1-2 weeks.  Encouraged leg elevation above the level of the heart for at least 30 minutes, 2-3 times a day.  Continue torsemide 10 mg twice daily as per cardiology.  No evidence of acute exacerbation based on today's exam.  Adhere to low-salt diet.  Encouraged weight loss.  Follow-up with podiatry as scheduled.  Follow-up in our clinic in 3  months unless needed sooner.    Was a pleasure meeting again  in our clinic.      Alek Coy MD, [facility]  Vascular Medicine  April 15, 2025    The longitudinal plan of care for the diagnosis(es)/condition(s) as documented were addressed during this visit. Due to the added complexity in care, I will continue to support Nlis in the subsequent management and with ongoing continuity of care.    I spent 35 minutes on the date of the encounter in patient visit and documentation/discussion.

## 2025-07-06 ENCOUNTER — HEALTH MAINTENANCE LETTER (OUTPATIENT)
Age: 70
End: 2025-07-06

## 2025-07-31 DIAGNOSIS — G20.C PARKINSONISM, UNSPECIFIED PARKINSONISM TYPE (H): ICD-10-CM

## 2025-07-31 RX ORDER — CARBIDOPA AND LEVODOPA 25; 100 MG/1; MG/1
TABLET ORAL
Qty: 180 TABLET | Refills: 2 | Status: SHIPPED | OUTPATIENT
Start: 2025-07-31

## 2025-07-31 NOTE — TELEPHONE ENCOUNTER
Neuroscience Clinic Task Note    TASK    Neurology Rx Refill  Medication carbidopa-levodopa (SINEMET)  MG tablet    Sig TAKE TWO TABLETS BY MOUTH THREE TIMES A DAY (6AM, NOON, 11P.M.)    Last refill ordered (/d/y) 2024   Last quantity ordered 540   Last # refills 11   Last clinic visit with ordering provider (/d/y) 2024   Next clinic visit with ordering provider (/d/) None Scheduled   All pertinent protocol data (lab date/result)    Pertinent information from patient's message        FOLLOW-UP  Pt is due for a follow up. T'd up 90 days refill, added a note to pharmacy, and messaged the pt to schedule a return.      ADDITIONAL COMMENTS  Spoke with pharmacy staff regarding excess refills, prescription  in 2025, new one needed.     Alcira Cisse